# Patient Record
Sex: MALE | Race: WHITE | Employment: OTHER | URBAN - METROPOLITAN AREA
[De-identification: names, ages, dates, MRNs, and addresses within clinical notes are randomized per-mention and may not be internally consistent; named-entity substitution may affect disease eponyms.]

---

## 2019-01-30 ENCOUNTER — OFFICE VISIT (OUTPATIENT)
Dept: ORTHOPEDIC SURGERY | Age: 84
End: 2019-01-30
Payer: MEDICARE

## 2019-01-30 VITALS
BODY MASS INDEX: 27.28 KG/M2 | DIASTOLIC BLOOD PRESSURE: 60 MMHG | HEART RATE: 74 BPM | SYSTOLIC BLOOD PRESSURE: 110 MMHG | HEIGHT: 68 IN | WEIGHT: 180 LBS

## 2019-01-30 DIAGNOSIS — I73.9 INTERMITTENT CLAUDICATION (HCC): ICD-10-CM

## 2019-01-30 DIAGNOSIS — M25.571 ACUTE RIGHT ANKLE PAIN: Primary | ICD-10-CM

## 2019-01-30 PROCEDURE — G8419 CALC BMI OUT NRM PARAM NOF/U: HCPCS | Performed by: PODIATRIST

## 2019-01-30 PROCEDURE — 1036F TOBACCO NON-USER: CPT | Performed by: PODIATRIST

## 2019-01-30 PROCEDURE — 4040F PNEUMOC VAC/ADMIN/RCVD: CPT | Performed by: PODIATRIST

## 2019-01-30 PROCEDURE — 1123F ACP DISCUSS/DSCN MKR DOCD: CPT | Performed by: PODIATRIST

## 2019-01-30 PROCEDURE — G8427 DOCREV CUR MEDS BY ELIG CLIN: HCPCS | Performed by: PODIATRIST

## 2019-01-30 PROCEDURE — G8484 FLU IMMUNIZE NO ADMIN: HCPCS | Performed by: PODIATRIST

## 2019-01-30 PROCEDURE — 1101F PT FALLS ASSESS-DOCD LE1/YR: CPT | Performed by: PODIATRIST

## 2019-01-30 PROCEDURE — 99203 OFFICE O/P NEW LOW 30 MIN: CPT | Performed by: PODIATRIST

## 2019-02-20 ENCOUNTER — HOSPITAL ENCOUNTER (OUTPATIENT)
Dept: VASCULAR LAB | Age: 84
Discharge: HOME | End: 2019-02-20
Payer: MEDICARE

## 2019-02-20 PROCEDURE — 93923 UPR/LXTR ART STDY 3+ LVLS: CPT

## 2019-02-21 ENCOUNTER — HOSPITAL ENCOUNTER (OUTPATIENT)
Dept: ULTRASOUND IMAGING | Age: 84
Discharge: HOME | End: 2019-02-21
Payer: MEDICARE

## 2019-02-21 DIAGNOSIS — N18.30 CKD (CHRONIC KIDNEY DISEASE), STAGE III (HCC): ICD-10-CM

## 2019-02-21 PROCEDURE — 76770 US EXAM ABDO BACK WALL COMP: CPT

## 2019-03-06 ENCOUNTER — OFFICE VISIT (OUTPATIENT)
Dept: ORTHOPEDIC SURGERY | Age: 84
End: 2019-03-06
Payer: MEDICARE

## 2019-03-06 VITALS
DIASTOLIC BLOOD PRESSURE: 60 MMHG | BODY MASS INDEX: 27.26 KG/M2 | SYSTOLIC BLOOD PRESSURE: 115 MMHG | HEART RATE: 73 BPM | HEIGHT: 68 IN | WEIGHT: 179.9 LBS

## 2019-03-06 DIAGNOSIS — L97.311 ANKLE ULCER, RIGHT, LIMITED TO BREAKDOWN OF SKIN (HCC): ICD-10-CM

## 2019-03-06 DIAGNOSIS — I73.9 INTERMITTENT CLAUDICATION (HCC): Primary | ICD-10-CM

## 2019-03-06 DIAGNOSIS — I73.9 PERIPHERAL VASCULAR DISEASE (HCC): ICD-10-CM

## 2019-03-06 PROCEDURE — 1123F ACP DISCUSS/DSCN MKR DOCD: CPT | Performed by: PODIATRIST

## 2019-03-06 PROCEDURE — 99213 OFFICE O/P EST LOW 20 MIN: CPT | Performed by: PODIATRIST

## 2019-03-06 PROCEDURE — 1101F PT FALLS ASSESS-DOCD LE1/YR: CPT | Performed by: PODIATRIST

## 2019-03-06 PROCEDURE — 1036F TOBACCO NON-USER: CPT | Performed by: PODIATRIST

## 2019-03-06 PROCEDURE — 4040F PNEUMOC VAC/ADMIN/RCVD: CPT | Performed by: PODIATRIST

## 2019-03-06 PROCEDURE — G8427 DOCREV CUR MEDS BY ELIG CLIN: HCPCS | Performed by: PODIATRIST

## 2019-03-06 PROCEDURE — G8484 FLU IMMUNIZE NO ADMIN: HCPCS | Performed by: PODIATRIST

## 2019-03-06 PROCEDURE — G8419 CALC BMI OUT NRM PARAM NOF/U: HCPCS | Performed by: PODIATRIST

## 2019-03-06 RX ORDER — ASPIRIN 81 MG/1
81 TABLET, CHEWABLE ORAL DAILY
Status: ON HOLD | COMMUNITY
End: 2020-08-17 | Stop reason: HOSPADM

## 2019-03-06 RX ORDER — FUROSEMIDE 20 MG/1
TABLET ORAL
COMMUNITY
End: 2019-04-10

## 2019-03-06 RX ORDER — ATORVASTATIN CALCIUM 40 MG/1
TABLET, FILM COATED ORAL
Status: ON HOLD | COMMUNITY
End: 2020-08-17 | Stop reason: HOSPADM

## 2019-03-06 RX ORDER — GLIPIZIDE 10 MG/1
TABLET ORAL
COMMUNITY
End: 2019-08-19

## 2019-03-06 RX ORDER — KETOROLAC TROMETHAMINE 5 MG/ML
SOLUTION OPHTHALMIC
COMMUNITY
End: 2019-04-10

## 2019-03-06 RX ORDER — NITROGLYCERIN 0.4 MG/1
TABLET SUBLINGUAL
COMMUNITY
End: 2019-10-07

## 2019-03-06 RX ORDER — METOPROLOL SUCCINATE 25 MG/1
12.5 TABLET, EXTENDED RELEASE ORAL DAILY
Status: ON HOLD | COMMUNITY
End: 2020-08-17 | Stop reason: HOSPADM

## 2019-03-06 RX ORDER — CEPHALEXIN 500 MG/1
CAPSULE ORAL
COMMUNITY
End: 2019-04-10

## 2019-03-06 RX ORDER — FINASTERIDE 5 MG/1
5 TABLET, FILM COATED ORAL DAILY
Status: ON HOLD | COMMUNITY
End: 2020-08-17 | Stop reason: HOSPADM

## 2019-03-06 RX ORDER — OMEPRAZOLE 40 MG/1
40 CAPSULE, DELAYED RELEASE ORAL DAILY
Status: ON HOLD | COMMUNITY
End: 2020-08-17 | Stop reason: HOSPADM

## 2019-03-06 RX ORDER — ONDANSETRON 4 MG/1
TABLET, FILM COATED ORAL
COMMUNITY
End: 2019-08-19

## 2019-03-06 RX ORDER — CIPROFLOXACIN HYDROCHLORIDE 3.5 MG/ML
SOLUTION/ DROPS TOPICAL
COMMUNITY
End: 2019-04-10

## 2019-03-06 RX ORDER — PREDNISOLONE ACETATE 10 MG/ML
SUSPENSION/ DROPS OPHTHALMIC
COMMUNITY
End: 2019-04-10

## 2019-03-06 RX ORDER — ALBUTEROL SULFATE 2.5 MG/3ML
SOLUTION RESPIRATORY (INHALATION)
COMMUNITY
End: 2019-12-16 | Stop reason: ALTCHOICE

## 2019-04-10 ENCOUNTER — APPOINTMENT (OUTPATIENT)
Dept: GENERAL RADIOLOGY | Age: 84
DRG: 871 | End: 2019-04-10
Payer: MEDICARE

## 2019-04-10 ENCOUNTER — HOSPITAL ENCOUNTER (INPATIENT)
Age: 84
LOS: 6 days | Discharge: NURSING, REHAB OR LONG-TERM CARE | DRG: 871 | End: 2019-04-16
Attending: EMERGENCY MEDICINE | Admitting: HOSPITALIST
Payer: MEDICARE

## 2019-04-10 DIAGNOSIS — R77.8 ELEVATED TROPONIN: ICD-10-CM

## 2019-04-10 DIAGNOSIS — D72.829 LEUKOCYTOSIS, UNSPECIFIED TYPE: ICD-10-CM

## 2019-04-10 DIAGNOSIS — J18.9 PNEUMONIA DUE TO ORGANISM: Primary | ICD-10-CM

## 2019-04-10 DIAGNOSIS — R09.02 HYPOXIA: ICD-10-CM

## 2019-04-10 DIAGNOSIS — R06.03 RESPIRATORY DISTRESS: ICD-10-CM

## 2019-04-10 PROBLEM — N17.0 ACUTE RENAL FAILURE WITH TUBULAR NECROSIS (HCC): Status: ACTIVE | Noted: 2019-04-10

## 2019-04-10 PROBLEM — N17.9 AKI (ACUTE KIDNEY INJURY) (HCC): Status: ACTIVE | Noted: 2019-04-10

## 2019-04-10 PROBLEM — J96.01 ACUTE HYPOXEMIC RESPIRATORY FAILURE (HCC): Status: ACTIVE | Noted: 2019-04-10

## 2019-04-10 LAB
A/G RATIO: 0.8 (ref 1.1–2.2)
ALBUMIN SERPL-MCNC: 4 G/DL (ref 3.4–5)
ALP BLD-CCNC: 97 U/L (ref 40–129)
ALT SERPL-CCNC: 11 U/L (ref 10–40)
ANION GAP SERPL CALCULATED.3IONS-SCNC: 14 MMOL/L (ref 3–16)
AST SERPL-CCNC: 15 U/L (ref 15–37)
BASE EXCESS VENOUS: -1.4 MMOL/L (ref -3–3)
BASOPHILS ABSOLUTE: 0.1 K/UL (ref 0–0.2)
BASOPHILS RELATIVE PERCENT: 0.7 %
BILIRUB SERPL-MCNC: 0.7 MG/DL (ref 0–1)
BUN BLDV-MCNC: 39 MG/DL (ref 7–20)
CALCIUM SERPL-MCNC: 10 MG/DL (ref 8.3–10.6)
CARBOXYHEMOGLOBIN: 3.4 % (ref 0–1.5)
CHLORIDE BLD-SCNC: 97 MMOL/L (ref 99–110)
CO2: 27 MMOL/L (ref 21–32)
CREAT SERPL-MCNC: 2 MG/DL (ref 0.8–1.3)
EOSINOPHILS ABSOLUTE: 0.2 K/UL (ref 0–0.6)
EOSINOPHILS RELATIVE PERCENT: 1.2 %
GFR AFRICAN AMERICAN: 39
GFR NON-AFRICAN AMERICAN: 32
GLOBULIN: 4.9 G/DL
GLUCOSE BLD-MCNC: 309 MG/DL (ref 70–99)
GLUCOSE BLD-MCNC: 317 MG/DL (ref 70–99)
GLUCOSE BLD-MCNC: 326 MG/DL (ref 70–99)
GLUCOSE BLD-MCNC: 338 MG/DL (ref 70–99)
GLUCOSE BLD-MCNC: 374 MG/DL (ref 70–99)
HCO3 VENOUS: 27.3 MMOL/L (ref 23–29)
HCT VFR BLD CALC: 41.3 % (ref 40.5–52.5)
HEMOGLOBIN: 12.7 G/DL (ref 13.5–17.5)
LACTIC ACID: 2.4 MMOL/L (ref 0.4–2)
LYMPHOCYTES ABSOLUTE: 1.2 K/UL (ref 1–5.1)
LYMPHOCYTES RELATIVE PERCENT: 7.1 %
MCH RBC QN AUTO: 24.3 PG (ref 26–34)
MCHC RBC AUTO-ENTMCNC: 30.7 G/DL (ref 31–36)
MCV RBC AUTO: 79.1 FL (ref 80–100)
METHEMOGLOBIN VENOUS: 0.3 %
MONOCYTES ABSOLUTE: 0.6 K/UL (ref 0–1.3)
MONOCYTES RELATIVE PERCENT: 3.9 %
NEUTROPHILS ABSOLUTE: 14.6 K/UL (ref 1.7–7.7)
NEUTROPHILS RELATIVE PERCENT: 87.1 %
O2 CONTENT, VEN: 6 VOL %
O2 SAT, VEN: 33 %
O2 THERAPY: ABNORMAL
PCO2, VEN: 64.9 MMHG (ref 40–50)
PDW BLD-RTO: 18.2 % (ref 12.4–15.4)
PERFORMED ON: ABNORMAL
PH VENOUS: 7.24 (ref 7.35–7.45)
PLATELET # BLD: 454 K/UL (ref 135–450)
PMV BLD AUTO: 7 FL (ref 5–10.5)
PO2, VEN: 24 MMHG (ref 25–40)
POTASSIUM REFLEX MAGNESIUM: 4.4 MMOL/L (ref 3.5–5.1)
PRO-BNP: 1589 PG/ML (ref 0–449)
RAPID INFLUENZA  B AGN: NEGATIVE
RAPID INFLUENZA A AGN: NEGATIVE
RBC # BLD: 5.22 M/UL (ref 4.2–5.9)
SODIUM BLD-SCNC: 138 MMOL/L (ref 136–145)
TCO2 CALC VENOUS: 29 MMOL/L
TOTAL PROTEIN: 8.9 G/DL (ref 6.4–8.2)
TROPONIN: 0.1 NG/ML
WBC # BLD: 16.8 K/UL (ref 4–11)

## 2019-04-10 PROCEDURE — 96365 THER/PROPH/DIAG IV INF INIT: CPT

## 2019-04-10 PROCEDURE — 6360000002 HC RX W HCPCS: Performed by: HOSPITALIST

## 2019-04-10 PROCEDURE — 6370000000 HC RX 637 (ALT 250 FOR IP): Performed by: HOSPITALIST

## 2019-04-10 PROCEDURE — 6370000000 HC RX 637 (ALT 250 FOR IP): Performed by: INTERNAL MEDICINE

## 2019-04-10 PROCEDURE — 99285 EMERGENCY DEPT VISIT HI MDM: CPT

## 2019-04-10 PROCEDURE — 6360000002 HC RX W HCPCS: Performed by: EMERGENCY MEDICINE

## 2019-04-10 PROCEDURE — 2500000003 HC RX 250 WO HCPCS: Performed by: INTERNAL MEDICINE

## 2019-04-10 PROCEDURE — 87040 BLOOD CULTURE FOR BACTERIA: CPT

## 2019-04-10 PROCEDURE — 99291 CRITICAL CARE FIRST HOUR: CPT | Performed by: INTERNAL MEDICINE

## 2019-04-10 PROCEDURE — 94761 N-INVAS EAR/PLS OXIMETRY MLT: CPT

## 2019-04-10 PROCEDURE — 87804 INFLUENZA ASSAY W/OPTIC: CPT

## 2019-04-10 PROCEDURE — 80053 COMPREHEN METABOLIC PANEL: CPT

## 2019-04-10 PROCEDURE — 83605 ASSAY OF LACTIC ACID: CPT

## 2019-04-10 PROCEDURE — 51798 US URINE CAPACITY MEASURE: CPT

## 2019-04-10 PROCEDURE — 94660 CPAP INITIATION&MGMT: CPT

## 2019-04-10 PROCEDURE — 36415 COLL VENOUS BLD VENIPUNCTURE: CPT

## 2019-04-10 PROCEDURE — 71045 X-RAY EXAM CHEST 1 VIEW: CPT

## 2019-04-10 PROCEDURE — 2580000003 HC RX 258: Performed by: HOSPITALIST

## 2019-04-10 PROCEDURE — 82803 BLOOD GASES ANY COMBINATION: CPT

## 2019-04-10 PROCEDURE — 83880 ASSAY OF NATRIURETIC PEPTIDE: CPT

## 2019-04-10 PROCEDURE — 94640 AIRWAY INHALATION TREATMENT: CPT

## 2019-04-10 PROCEDURE — 84484 ASSAY OF TROPONIN QUANT: CPT

## 2019-04-10 PROCEDURE — 85025 COMPLETE CBC W/AUTO DIFF WBC: CPT

## 2019-04-10 PROCEDURE — 6360000002 HC RX W HCPCS: Performed by: INTERNAL MEDICINE

## 2019-04-10 PROCEDURE — 83036 HEMOGLOBIN GLYCOSYLATED A1C: CPT

## 2019-04-10 PROCEDURE — 2580000003 HC RX 258: Performed by: INTERNAL MEDICINE

## 2019-04-10 PROCEDURE — 6370000000 HC RX 637 (ALT 250 FOR IP): Performed by: EMERGENCY MEDICINE

## 2019-04-10 PROCEDURE — 93005 ELECTROCARDIOGRAM TRACING: CPT | Performed by: EMERGENCY MEDICINE

## 2019-04-10 PROCEDURE — 2000000000 HC ICU R&B

## 2019-04-10 RX ORDER — DULOXETIN HYDROCHLORIDE 60 MG/1
60 CAPSULE, DELAYED RELEASE ORAL DAILY
Status: ON HOLD | COMMUNITY
End: 2020-08-17 | Stop reason: HOSPADM

## 2019-04-10 RX ORDER — DEXTROSE MONOHYDRATE 25 G/50ML
12.5 INJECTION, SOLUTION INTRAVENOUS PRN
Status: DISCONTINUED | OUTPATIENT
Start: 2019-04-10 | End: 2019-04-16 | Stop reason: HOSPADM

## 2019-04-10 RX ORDER — GUAIFENESIN 600 MG/1
600 TABLET, EXTENDED RELEASE ORAL 2 TIMES DAILY
Status: ON HOLD | COMMUNITY
End: 2019-04-16 | Stop reason: HOSPADM

## 2019-04-10 RX ORDER — GLIPIZIDE 5 MG/1
5 TABLET ORAL
Status: ON HOLD | COMMUNITY
End: 2019-08-22

## 2019-04-10 RX ORDER — GUAIFENESIN 600 MG/1
600 TABLET, EXTENDED RELEASE ORAL DAILY
Status: ON HOLD | COMMUNITY
End: 2019-04-10

## 2019-04-10 RX ORDER — SODIUM CHLORIDE 0.9 % (FLUSH) 0.9 %
10 SYRINGE (ML) INJECTION PRN
Status: DISCONTINUED | OUTPATIENT
Start: 2019-04-10 | End: 2019-04-16 | Stop reason: HOSPADM

## 2019-04-10 RX ORDER — LEVOFLOXACIN 25 MG/ML
500 INJECTION INTRAVENOUS ONCE
Status: DISCONTINUED | OUTPATIENT
Start: 2019-04-10 | End: 2019-04-10

## 2019-04-10 RX ORDER — INSULIN GLARGINE 100 [IU]/ML
12 INJECTION, SOLUTION SUBCUTANEOUS NIGHTLY
COMMUNITY
End: 2019-10-07

## 2019-04-10 RX ORDER — FERROUS SULFATE 325(65) MG
325 TABLET ORAL
COMMUNITY
End: 2019-11-04

## 2019-04-10 RX ORDER — NITROGLYCERIN 0.4 MG/1
0.4 TABLET SUBLINGUAL EVERY 5 MIN PRN
Status: DISCONTINUED | OUTPATIENT
Start: 2019-04-10 | End: 2019-04-16 | Stop reason: HOSPADM

## 2019-04-10 RX ORDER — METHYLPREDNISOLONE SODIUM SUCCINATE 40 MG/ML
40 INJECTION, POWDER, LYOPHILIZED, FOR SOLUTION INTRAMUSCULAR; INTRAVENOUS EVERY 12 HOURS
Status: DISCONTINUED | OUTPATIENT
Start: 2019-04-10 | End: 2019-04-11

## 2019-04-10 RX ORDER — SODIUM CHLORIDE 0.9 % (FLUSH) 0.9 %
10 SYRINGE (ML) INJECTION EVERY 12 HOURS SCHEDULED
Status: DISCONTINUED | OUTPATIENT
Start: 2019-04-10 | End: 2019-04-16 | Stop reason: HOSPADM

## 2019-04-10 RX ORDER — ONDANSETRON 4 MG/1
4 TABLET, ORALLY DISINTEGRATING ORAL EVERY 8 HOURS PRN
Status: DISCONTINUED | OUTPATIENT
Start: 2019-04-10 | End: 2019-04-16 | Stop reason: HOSPADM

## 2019-04-10 RX ORDER — IPRATROPIUM BROMIDE AND ALBUTEROL SULFATE 2.5; .5 MG/3ML; MG/3ML
1 SOLUTION RESPIRATORY (INHALATION) EVERY 4 HOURS
Status: DISCONTINUED | OUTPATIENT
Start: 2019-04-10 | End: 2019-04-11

## 2019-04-10 RX ORDER — PANTOPRAZOLE SODIUM 40 MG/1
40 TABLET, DELAYED RELEASE ORAL
Status: DISCONTINUED | OUTPATIENT
Start: 2019-04-10 | End: 2019-04-16 | Stop reason: HOSPADM

## 2019-04-10 RX ORDER — CHLORTHALIDONE 25 MG/1
25 TABLET ORAL DAILY
Status: ON HOLD | COMMUNITY
End: 2019-04-16 | Stop reason: HOSPADM

## 2019-04-10 RX ORDER — IPRATROPIUM BROMIDE AND ALBUTEROL SULFATE 2.5; .5 MG/3ML; MG/3ML
1 SOLUTION RESPIRATORY (INHALATION) ONCE
Status: COMPLETED | OUTPATIENT
Start: 2019-04-10 | End: 2019-04-10

## 2019-04-10 RX ORDER — CILOSTAZOL 100 MG/1
100 TABLET ORAL 2 TIMES DAILY
Status: DISCONTINUED | OUTPATIENT
Start: 2019-04-10 | End: 2019-04-16 | Stop reason: HOSPADM

## 2019-04-10 RX ORDER — GABAPENTIN 300 MG/1
300 CAPSULE ORAL NIGHTLY
COMMUNITY
End: 2019-11-04 | Stop reason: ALTCHOICE

## 2019-04-10 RX ORDER — ASPIRIN 81 MG/1
81 TABLET, CHEWABLE ORAL DAILY
Status: DISCONTINUED | OUTPATIENT
Start: 2019-04-10 | End: 2019-04-16 | Stop reason: HOSPADM

## 2019-04-10 RX ORDER — METOPROLOL SUCCINATE 25 MG/1
25 TABLET, EXTENDED RELEASE ORAL DAILY
Status: DISCONTINUED | OUTPATIENT
Start: 2019-04-10 | End: 2019-04-10

## 2019-04-10 RX ORDER — IPRATROPIUM BROMIDE AND ALBUTEROL SULFATE 2.5; .5 MG/3ML; MG/3ML
1 SOLUTION RESPIRATORY (INHALATION)
Status: DISCONTINUED | OUTPATIENT
Start: 2019-04-10 | End: 2019-04-10

## 2019-04-10 RX ORDER — METHYLPREDNISOLONE SODIUM SUCCINATE 40 MG/ML
40 INJECTION, POWDER, LYOPHILIZED, FOR SOLUTION INTRAMUSCULAR; INTRAVENOUS EVERY 8 HOURS
Status: DISCONTINUED | OUTPATIENT
Start: 2019-04-10 | End: 2019-04-10

## 2019-04-10 RX ORDER — SODIUM CHLORIDE 9 MG/ML
INJECTION, SOLUTION INTRAVENOUS CONTINUOUS
Status: DISCONTINUED | OUTPATIENT
Start: 2019-04-10 | End: 2019-04-14

## 2019-04-10 RX ORDER — POLYETHYLENE GLYCOL 3350 17 G/17G
17 POWDER, FOR SOLUTION ORAL SEE ADMIN INSTRUCTIONS
Status: ON HOLD | COMMUNITY
End: 2020-08-17 | Stop reason: HOSPADM

## 2019-04-10 RX ORDER — DEXTROSE MONOHYDRATE 50 MG/ML
100 INJECTION, SOLUTION INTRAVENOUS PRN
Status: DISCONTINUED | OUTPATIENT
Start: 2019-04-10 | End: 2019-04-16 | Stop reason: HOSPADM

## 2019-04-10 RX ORDER — LEVOFLOXACIN 5 MG/ML
750 INJECTION, SOLUTION INTRAVENOUS ONCE
Status: COMPLETED | OUTPATIENT
Start: 2019-04-10 | End: 2019-04-10

## 2019-04-10 RX ORDER — OXYCODONE HYDROCHLORIDE 5 MG/1
5 CAPSULE ORAL 2 TIMES DAILY
Status: ON HOLD | COMMUNITY
End: 2019-04-16 | Stop reason: HOSPADM

## 2019-04-10 RX ORDER — ATORVASTATIN CALCIUM 40 MG/1
40 TABLET, FILM COATED ORAL DAILY
Status: DISCONTINUED | OUTPATIENT
Start: 2019-04-10 | End: 2019-04-16 | Stop reason: HOSPADM

## 2019-04-10 RX ORDER — INSULIN GLARGINE 100 [IU]/ML
3 INJECTION, SOLUTION SUBCUTANEOUS NIGHTLY
Status: DISCONTINUED | OUTPATIENT
Start: 2019-04-10 | End: 2019-04-11

## 2019-04-10 RX ORDER — GUAIFENESIN 600 MG/1
600 TABLET, EXTENDED RELEASE ORAL DAILY
Status: DISCONTINUED | OUTPATIENT
Start: 2019-04-10 | End: 2019-04-16 | Stop reason: HOSPADM

## 2019-04-10 RX ORDER — GABAPENTIN 100 MG/1
100 CAPSULE ORAL DAILY
COMMUNITY
End: 2019-08-19

## 2019-04-10 RX ORDER — NICOTINE POLACRILEX 4 MG
15 LOZENGE BUCCAL PRN
Status: DISCONTINUED | OUTPATIENT
Start: 2019-04-10 | End: 2019-04-16 | Stop reason: HOSPADM

## 2019-04-10 RX ORDER — ONDANSETRON 4 MG/1
4 TABLET, FILM COATED ORAL EVERY 8 HOURS PRN
Status: DISCONTINUED | OUTPATIENT
Start: 2019-04-10 | End: 2019-04-10

## 2019-04-10 RX ORDER — ERGOCALCIFEROL 1.25 MG/1
10000 CAPSULE ORAL WEEKLY
Status: DISCONTINUED | OUTPATIENT
Start: 2019-04-10 | End: 2019-04-15

## 2019-04-10 RX ORDER — CHLORTHALIDONE 25 MG/1
25 TABLET ORAL DAILY
Status: DISCONTINUED | OUTPATIENT
Start: 2019-04-10 | End: 2019-04-10

## 2019-04-10 RX ORDER — OXYCODONE HYDROCHLORIDE 5 MG/1
5 TABLET ORAL 2 TIMES DAILY
Status: DISCONTINUED | OUTPATIENT
Start: 2019-04-10 | End: 2019-04-11

## 2019-04-10 RX ORDER — ERGOCALCIFEROL 1.25 MG/1
10000 CAPSULE ORAL WEEKLY
Status: ON HOLD | COMMUNITY
End: 2019-04-15

## 2019-04-10 RX ORDER — GABAPENTIN 100 MG/1
100 CAPSULE ORAL DAILY
Status: DISCONTINUED | OUTPATIENT
Start: 2019-04-10 | End: 2019-04-16 | Stop reason: HOSPADM

## 2019-04-10 RX ORDER — FINASTERIDE 5 MG/1
5 TABLET, FILM COATED ORAL DAILY
Status: DISCONTINUED | OUTPATIENT
Start: 2019-04-10 | End: 2019-04-10

## 2019-04-10 RX ORDER — CILOSTAZOL 100 MG/1
100 TABLET ORAL 2 TIMES DAILY
COMMUNITY
End: 2019-12-16 | Stop reason: ALTCHOICE

## 2019-04-10 RX ORDER — ONDANSETRON 2 MG/ML
4 INJECTION INTRAMUSCULAR; INTRAVENOUS EVERY 6 HOURS PRN
Status: DISCONTINUED | OUTPATIENT
Start: 2019-04-10 | End: 2019-04-16 | Stop reason: HOSPADM

## 2019-04-10 RX ORDER — BUDESONIDE AND FORMOTEROL FUMARATE DIHYDRATE 80; 4.5 UG/1; UG/1
2 AEROSOL RESPIRATORY (INHALATION) 2 TIMES DAILY
COMMUNITY
End: 2019-08-19

## 2019-04-10 RX ADMIN — INSULIN LISPRO 6 UNITS: 100 INJECTION, SOLUTION INTRAVENOUS; SUBCUTANEOUS at 20:40

## 2019-04-10 RX ADMIN — OXYCODONE HYDROCHLORIDE 5 MG: 5 TABLET ORAL at 20:58

## 2019-04-10 RX ADMIN — VANCOMYCIN HYDROCHLORIDE 1250 MG: 1 INJECTION, POWDER, LYOPHILIZED, FOR SOLUTION INTRAVENOUS at 11:45

## 2019-04-10 RX ADMIN — METHYLPREDNISOLONE SODIUM SUCCINATE 40 MG: 40 INJECTION, POWDER, FOR SOLUTION INTRAMUSCULAR; INTRAVENOUS at 20:39

## 2019-04-10 RX ADMIN — IPRATROPIUM BROMIDE AND ALBUTEROL SULFATE 1 AMPULE: .5; 3 SOLUTION RESPIRATORY (INHALATION) at 04:55

## 2019-04-10 RX ADMIN — DEXMEDETOMIDINE HYDROCHLORIDE 0.2 MCG/KG/HR: 100 INJECTION, SOLUTION INTRAVENOUS at 11:44

## 2019-04-10 RX ADMIN — INSULIN LISPRO 15 UNITS: 100 INJECTION, SOLUTION INTRAVENOUS; SUBCUTANEOUS at 16:44

## 2019-04-10 RX ADMIN — CILOSTAZOL 100 MG: 100 TABLET ORAL at 20:57

## 2019-04-10 RX ADMIN — DEXMEDETOMIDINE HYDROCHLORIDE 0.1 MCG/KG/HR: 100 INJECTION, SOLUTION INTRAVENOUS at 13:11

## 2019-04-10 RX ADMIN — INSULIN LISPRO 12 UNITS: 100 INJECTION, SOLUTION INTRAVENOUS; SUBCUTANEOUS at 12:24

## 2019-04-10 RX ADMIN — INSULIN GLARGINE 3 UNITS: 100 INJECTION, SOLUTION SUBCUTANEOUS at 20:40

## 2019-04-10 RX ADMIN — IPRATROPIUM BROMIDE AND ALBUTEROL SULFATE 1 AMPULE: .5; 3 SOLUTION RESPIRATORY (INHALATION) at 08:20

## 2019-04-10 RX ADMIN — LEVOFLOXACIN 750 MG: 5 INJECTION, SOLUTION INTRAVENOUS at 05:39

## 2019-04-10 RX ADMIN — INSULIN LISPRO 4 UNITS: 100 INJECTION, SOLUTION INTRAVENOUS; SUBCUTANEOUS at 08:36

## 2019-04-10 RX ADMIN — Medication 10 ML: at 20:39

## 2019-04-10 RX ADMIN — IPRATROPIUM BROMIDE AND ALBUTEROL SULFATE 1 AMPULE: .5; 3 SOLUTION RESPIRATORY (INHALATION) at 19:46

## 2019-04-10 RX ADMIN — METHYLPREDNISOLONE SODIUM SUCCINATE 40 MG: 40 INJECTION, POWDER, FOR SOLUTION INTRAMUSCULAR; INTRAVENOUS at 08:29

## 2019-04-10 RX ADMIN — ENOXAPARIN SODIUM 30 MG: 100 INJECTION SUBCUTANEOUS at 12:02

## 2019-04-10 RX ADMIN — CEFEPIME 2 G: 2 INJECTION, POWDER, FOR SOLUTION INTRAVENOUS at 13:15

## 2019-04-10 RX ADMIN — IPRATROPIUM BROMIDE AND ALBUTEROL SULFATE 1 AMPULE: .5; 3 SOLUTION RESPIRATORY (INHALATION) at 15:33

## 2019-04-10 RX ADMIN — IPRATROPIUM BROMIDE AND ALBUTEROL SULFATE 1 AMPULE: .5; 3 SOLUTION RESPIRATORY (INHALATION) at 11:00

## 2019-04-10 RX ADMIN — SODIUM CHLORIDE: 9 INJECTION, SOLUTION INTRAVENOUS at 23:46

## 2019-04-10 RX ADMIN — SODIUM CHLORIDE: 9 INJECTION, SOLUTION INTRAVENOUS at 08:24

## 2019-04-10 ASSESSMENT — PAIN SCALES - GENERAL: PAINLEVEL_OUTOF10: 0

## 2019-04-10 NOTE — H&P
History and Physical      Chief Complaint   Patient presents with    Respiratory Distress        HISTORY OF PRESENT ILLNESS:     Patient is a 80-year-old white male with a history of COPD, coronary artery disease who was brought to emergency room because of acute onset of shortness of breath. Patient was found to be in acute hypoxemic respiratory failure. Workup also showed bilateral pneumonia. He is unable to give me much of a history of present BiPAP. He's felt poorly for about a month. He's had a progressive cough and shortness of breath at rest. At home he is not on oxygen. He is a former smoker. He has sore on right lateral ankle and left big toe. He has not made any urine. He is a past medical history of coronary artery disease, COPD, diabetes, AAA, depression, hypertension, hyperlipidemia and he is status post pacemaker. Patient is allergic to no known allergies.     Past Medical History:   Diagnosis Date    AAA (abdominal aortic aneurysm) (HCC)     Acute constipation     Acute gastrointestinal hemorrhage     Bladder CA in situ     BPH (benign prostatic hyperplasia)     Constipation     COPD (chronic obstructive pulmonary disease) (HCC)     Coronary arteriosclerosis     Current mild episode of major depressive disorder (HCC)     Depression     Diabetes mellitus (HCC)     Diabetic neuropathy (HCC)     Diverticulitis of intestine w/o perforation or abscess with bleeding     GERD (gastroesophageal reflux disease)     GI hemorrhage     Hypercholesteremia     Hypertension     Hypoaldosteronism ([address]     Pacemaker     Postsurgical aortocoronary bypass status     Pure hypercholesterolemia     S/P cataract surgery     Ulcer of left foot ([address]     Vitamin D deficiency        Past Surgical History:   Procedure Laterality Date    EYE SURGERY      PACEMAKER INSERTION         Scheduled Meds:   aspirin  81 mg Oral Daily    atorvastatin  40 mg Oral Daily    cilostazol  100 mg Oral Negative   Rapid Influenza B Ag Latest Ref Range: Negative  Negative     XR CHEST PORTABLE   Final Result   Multifocal bilateral pneumonia with bilateral pleural effusions. ECG: demand pacemaker    ASSESSMENT:    Principal Problem:    Acute hypoxemic respiratory failure (HCC)  Active Problems:    HCAP (healthcare-associated pneumonia)    MARCOS (acute kidney injury) ([facility] [address]    Elevated troponin    COPD (chronic obstructive pulmonary disease) (HCC)    Hypertension    Hypercholesteremia    GERD (gastroesophageal reflux disease)    Diabetes mellitus ([facility] [address]  Resolved Problems:    * No resolved hospital problems. *      PLAN:    #  Acute respiratory failure: Work up was consistent with acute respiratory failure. Patient was placed on BiPAP. Will continue use BiPAP for now. He is somewhat lethargic. Await clinical improvement in respiratory status. #  Healthcare associated pneumonia due to gram-negative organism or MRSA. Patient is a nursing home resident. The patient started on vancomycin and cefepime. Await culture results. #  Acute kidney injury possibly from prerenal azotemia. On IV fluids. Repeat labs in a.m. #  Nonspecific elevation of troponin. We'll trend troponins. No ACS. #  COPD exacerbation. Use steroids and handheld nebulizer treatments. #  Hypertension blood pressure stable. #  Use Lovenox for DVT prophylaxis. #  Diabetes mellitus type 2. Use of insulin. 33 minutes of CCT spent in evaluation, documentation, reviewing of data and discussing with consultants. All questions and concerns were addressed to the patient/family. Alternatives to my treatment were discussed. The note was completed using EMR. Every effort was made to ensure accuracy; however, inadvertent computerized transcription errors may be present.                   Capri Patel 4/10/2019 11:51 AM

## 2019-04-10 NOTE — PROGRESS NOTES
Pharmacy Note  Vancomycin Consult    Jayden Jarvis is a 80 y.o. male started on Vancomycin for PNA consult received from Dr. Alexandr Escobar to manage therapy. Also receiving the following antibiotics: Cefepime. Patient Active Problem List   Diagnosis    HCAP (healthcare-associated pneumonia)    Acute hypoxemic respiratory failure (HCC)       Allergies:  No known allergies     Temp max: 98.7    Recent Labs     04/10/19  0449   BUN 39*       Recent Labs     04/10/19  0449   CREATININE 2.0*       Recent Labs     04/10/19  0449   WBC 16.8*       No intake or output data in the 24 hours ending 04/10/19 1101    Culture Date      Source                       Results  NGTD    Ht Readings from Last 1 Encounters:   04/10/19 5' 7\" (1.702 m)        Wt Readings from Last 1 Encounters:   04/10/19 180 lb (81.6 kg)         Body mass index is 28.19 kg/m². Estimated Creatinine Clearance: 28 mL/min (A) (based on SCr of 2 mg/dL (H)). Goal Trough Level: 15-18mcg/mL    Assessment/Plan:  Will initiate vancomycin 1250mg IV times one. Will pulse dose, random in am, cefepime 2gm q24h placed    Thank you for the consult. Will continue to follow.   Abbey Gonzalez Pharm D 5/87/252838:39 AM  .

## 2019-04-10 NOTE — LETTER
including skilled nursing facilities, home health agencies, inpatient rehabilitation facilities, and long term care hospitals. [facility] is working closely with the doctors and other health care providers that care for you during and following your hospital stay and for a period of time after you leave the hospital. By working together, the health care providers are trying to more efficiently provide well-managed, high quality, patient-centered care as you undergo treatment. Hospitals, doctors, and other health care providers that care for you following a hospital stay may receive an additional payment for providing better, more coordinated health care. Medicare will monitor your care to make sure you and others get high quality care. Your feedback is important     Medicare may also ask you to answer a survey about the services and care you received from [address] will be mailed to you. Your feedback will improve care for all people with Medicare who receive care from [facility]. Completion of this survey is optional.     Get more information     For more information about the Bundled Payments for [address] [place] [address], you can:    · Visit the CMS BPCI Advanced Website at http://mcdonald-olivia.net/ initiatives/bpci-advanced   · Call the [facility] team at (543) 459-9224. · Call 1-800-MEDICARE (1-337.308.3375). TTY users can call 5-604.452.9492     If you have concerns or complaints about your care, talk to your health care provider, or contact your Beneficiary and Family Centered Quality Improvement Organization [facility]). To get your [facility]'s phone number, visit Medicare.gov/contacts or call 1-800-MEDICARE. · To find a different hospital, visit www. hospitalcompare.[facility].gov or call 1-800[employer] MEDICARE (1-346.770.4416). TTY users should call 6-624.472.5813.

## 2019-04-10 NOTE — PROGRESS NOTES
Patient admitted to the ICU from [place], arrived by squad with bipap on. He is alert and oriented, VSS, BP on the low side 96/53. He denies any dizzines, pain or problems with breathing on the bipap. Upon admission he was assessed, given chlorhexidine bath. He was oriented to the ICU room call light, bed operation, and updated on the plan of care. He denied any questions at this time. He rests in bed with his eyes closed.

## 2019-04-10 NOTE — PROGRESS NOTES
Report received from Kia Bishop, [facility]. Pt quiet in bed with eyes closed. No obvious distress. BiPAP in use. NS @ 75 infusing. Care transferred.

## 2019-04-10 NOTE — ED TRIAGE NOTES
Pt arrived via EMS c/o respiratory distress. Pt from [facility], report from nurse stated pt had been struggling all night, O2 got to 62% on RA. Pt also c/o nausea and a headache. Pt was placed on 15L NRB and given duoneb treatment en route, 89% upon arrival. Pt roomed in 2, placed on bipap. Oxygen improved to % at this time.

## 2019-04-10 NOTE — PROGRESS NOTES
Patient becoming hypotensive. Patient arouses to voice and once stimulated, blood pressure rises. Precedex stopped at this time.

## 2019-04-10 NOTE — PROGRESS NOTES
4 Eyes Skin Assessment     The patient is being assess for   Admission    I agree that 2 RN's have performed a thorough Head to Toe Skin Assessment on the patient. ALL assessment sites listed below have been assessed. Areas assessed by both nurses:   [x]   Head, Face, and Ears   [x]   Shoulders, Back, and Chest, Abdomen  []   Arms, Elbows, and Hands   [x]   Coccyx, Sacrum, and Ischium  [x]   Legs, Feet, and Heels        -skin tear L elbow per NH documentation, appears to be an abrasion  -L great toe with abrasion  -R outer ankle, 2 areas (one possible pressure)       **SHARE this note so that the co-signing nurse is able to place an eSignature**    Co-signer eSignature: Electronically signed by Tram Wilson RN on 4/10/19 at 2:24 PM    Does the Patient have Skin Breakdown?   Yes LDA WOUND CARE was Initiated documentation include the Josee-wound, Wound Assessment, Measurements, Dressing Treatment, Drainage, and Color\",          Lucien Prevention initiated:  Yes   Wound Care Orders initiated:  Yes      [id number]  nurse consulted for Pressure Injury (Stage 3,4, Unstageable, DTI, NWPT, Complex wounds)and New or Established Ostomies:  Yes      Primary Nurse eSignature: Electronically signed by Meme Gorman RN on 4/10/19 at 1:16 PM

## 2019-04-10 NOTE — PROGRESS NOTES
delivered and/or substituted as outlined below. Aerosolized Medications Ordering and Administration Guidelines:    1. All Medications will be ordered by a physician, and their frequency and/or modality will be adjusted as defined by the patients Respiratory Severity Index (RSI) score. 2. If the patient does not have documented COPD, consider discontinuing anticholinergics when RSI is less than 9.  3. If the bronchospasm worsens (increased RSI), then the bronchodilator frequency can be increased to a maximum of every 4 hours. If greater than every 4 hours is required, the physician will be contacted. 4. If the bronchospasm improves, the frequency of the bronchodilator can be decreased, based on the patient's RSI, but not less than home treatment regimen frequency. 5. Bronchodilator(s) will be discontinued if patient has a RSI less than 9 and has received no scheduled or as needed treatment for 72  Hrs. Patients Ordered on a Mucolytic Agent:    1. Must always be administered with a bronchodilator. 2. Discontinue if patient experiences worsened bronchospasm, or secretions have lessened to the point that the patient is able to clear them with a cough. Anti-inflammatory and Combination Medications:    1. If the patient lacks prior history of lung disease, is not using inhaled anti-inflammatory medication at home, and lacks wheezing by examination or by history for at least 24 hours, contact physician for possible discontinuation.

## 2019-04-10 NOTE — CONSULTS
Patient is being seen at the request of Dr. Sanam Denis for a consultation for Acute respiratory failure with hypoxemia    HISTORY OF PRESENT ILLNESS: The patient is an 79 yo man with hx of COPD, [facility] resident, CAD who was BIBA after becoming acutely more short of breath and hypoxemic overnight. He states he has felt poorly for last month. He has been using Inhaled bronchodilators with some relief. He has a productive cough and progressive dyspnea at rest.  Upon arrival to ER he was noted to be in some respiratory distress and hypoxemic. He was placed upon NIPPV and admitted to the ICU. He is a former smoker. PAST MEDICAL HISTORY:  Past Medical History:   Diagnosis Date    [facility]     Acute constipation     Acute gastrointestinal hemorrhage     Bladder CA in situ     BPH (benign prostatic hyperplasia)     Constipation     COPD (chronic obstructive pulmonary disease) ([facility]     Coronary arteriosclerosis     Current mild episode of major depressive disorder (HCC)     Depression     Diabetes mellitus (HCC)     Diabetic neuropathy ([facility])     Diverticulitis of intestine w/o perforation or abscess with bleeding     GERD (gastroesophageal reflux disease)     GI hemorrhage     Hypercholesteremia     Hypertension     Hypoaldosteronism ([address]     Pacemaker     Postsurgical aortocoronary bypass status     Pure hypercholesterolemia     S/P cataract surgery     Ulcer of left foot ([address]     Vitamin D deficiency      PAST SURGICAL HISTORY:  Past Surgical History:   Procedure Laterality Date    EYE SURGERY      PACEMAKER INSERTION         FAMILY HISTORY:  family history is not on file. SOCIAL HISTORY:   reports that he has never smoked.  He has never used smokeless tobacco.    Scheduled Meds:   methylPREDNISolone sodium  40 mg Intravenous Q8H    aspirin  81 mg Oral Daily    atorvastatin  40 mg Oral Daily    mometasone-formoterol  2 puff Inhalation BID    chlorthalidone 25 mg Oral Daily    cilostazol  100 mg Oral BID    finasteride  5 mg Oral Daily    gabapentin  100 mg Oral Daily    guaiFENesin  600 mg Oral Daily    insulin glargine  3 Units Subcutaneous Nightly    metoprolol succinate  25 mg Oral Daily    pantoprazole  40 mg Oral QAM AC    oxyCODONE  5 mg Oral BID    vitamin D  50,000 Units Oral Weekly    sodium chloride flush  10 mL Intravenous 2 times per day    enoxaparin  30 mg Subcutaneous Daily    insulin lispro  0-6 Units Subcutaneous TID WC    insulin lispro  0-3 Units Subcutaneous Nightly     Continuous Infusions:   sodium chloride 75 mL/hr at 04/10/19 0824    dextrose       PRN Meds:  nitroGLYCERIN, sodium chloride flush, magnesium hydroxide, ondansetron, glucose, dextrose, glucagon (rDNA), dextrose, ondansetron    ALLERGIES:  Patient is allergic to no known allergies. REVIEW OF SYSTEMS:  Constitutional: Negative for fever  HENT: Negative for sore throat  Eyes: Negative for redness   Respiratory: +  for dyspnea, + cough  Cardiovascular: Negative for chest pain  Gastrointestinal: Negative for vomiting, diarrhea   Genitourinary: Negative for hematuria   Musculoskeletal: Negative for arthralgias   Skin: Negative for rash  Neurological: Negative for syncope  Hematological: Negative for adenopathy  Psychiatric/Behavorial: Negative for anxiety    PHYSICAL EXAM:  Blood pressure (!) 91/54, pulse 70, temperature 98.7 °F (37.1 °C), temperature source Temporal, resp. rate 16, height 5' 7\" (1.702 m), weight 180 lb (81.6 kg), SpO2 98 %.' on bipap 30%  Gen: No distress. Normocephalic, atraumatic. Eyes: PERRL. No sclera icterus. No conjunctival injection. ENT: No discharge. Pharynx clear. Neck: Trachea midline. No obvious mass. Resp: No accessory muscle use. No crackles. Scattered bilateral wheezes. No rhonchi. No dullness on percussion. CV: Regular rate. Regular rhythm. No murmur or rub. No edema. GI: Non-tender. Non-distended. No hernia.    Skin: Warm and dry. No nodule on exposed extremities. Lymph: No cervical LAD. No supraclavicular LAD. M/S: No cyanosis. No joint deformity. No clubbing. Neuro: Awake. Alert. Moves all four extremities. Psych: Oriented x 3. No anxiety. LABS:  CBC:   Recent Labs     04/10/19  0449   WBC 16.8*   HGB 12.7*   HCT 41.3   MCV 79.1*   *     BMP:   Recent Labs     04/10/19  0449      K 4.4   CL 97*   CO2 27   BUN 39*   CREATININE 2.0*     LIVER PROFILE:   Recent Labs     04/10/19  0449   AST 15   ALT 11   BILITOT 0.7   ALKPHOS 97     PT/INR: No results for input(s): PROTIME, INR in the last 72 hours. APTT: No results for input(s): APTT in the last 72 hours. UA:No results for input(s): NITRITE, COLORU, PHUR, LABCAST, WBCUA, RBCUA, MUCUS, TRICHOMONAS, YEAST, BACTERIA, CLARITYU, SPECGRAV, LEUKOCYTESUR, UROBILINOGEN, BILIRUBINUR, BLOODU, GLUCOSEU, AMORPHOUS in the last 72 hours. Invalid input(s): KETONESU  No results for input(s): PHART, VWH3RTU, PO2ART in the last 72 hours. Chest imaging was reviewed by me and showed:  CXR: severe multifocal airspace consolidation R>L    ASSESSMENT:  ·  Acute respiratory failure with hypoxemia  · Healthcare associated pneumonia - probable gram negative, risk for methicillin resistant Staph aureus as well   · Elevated troponin  · CAD  · Pacemaker  · DM    PLAN:  · Non-invasive positive pressure ventilation per my orders. The ventilator was adjusted by me at the bedside for unstable, life threatening respiratory failure. Wean oxygen as tolerated. Supplemental oxygen to maintain SaO2 >92%; wean as tolerated  Antibiotics to cover HCAP (vanc and cefepime)  Iv steroids  Inhaled bronchodilators   Follow sputum and blood cultures  · Rapid flu test  · Lantus 3 U daily, HSSI  · ICU care  · D/w POA- daughter in law and wife with patient. He wishes to be full code for now but would not want prolonged ventilation (weeks)    . Patient is critically ill with acute respiratory failure. We will adjust NIPPV as above. Critical care time spent reviewing labs/films, examining patient, collaborating with other physicians but excluding procedures for life threatening organ failure is 32 minutes.

## 2019-04-10 NOTE — ED PROVIDER NOTES
tablet,extended release   Take 1 tablet every day by oral route. NITROGLYCERIN (NITROSTAT) 0.4 MG SL TABLET    nitroglycerin 0.4 mg sublingual tablet    OMEPRAZOLE (PRILOSEC) 40 MG DELAYED RELEASE CAPSULE    omeprazole 40 mg capsule,delayed release    ONDANSETRON (ZOFRAN) 4 MG TABLET    ondansetron HCl 4 mg tablet     Allergies   Allergen Reactions    No Known Allergies         Physical Exam       ED Triage Vitals   BP Temp Temp Source Pulse Resp SpO2 Height Weight   04/10/19 0503 04/10/19 0503 04/10/19 0503 04/10/19 0503 04/10/19 0503 04/10/19 0503 04/10/19 0501 04/10/19 0501   (!) 169/69 98.7 °F (37.1 °C) Temporal 77 24 (!) 80 % 5' 7\" (1.702 m) 180 lb (81.6 kg)     Physical Exam   Constitutional: He is oriented to person, place, and time. He appears well-developed and well-nourished. He appears distressed. Patient is sitting up in bed, appears tired and fatigued. He does appear to be in some moderate respiratory distress and is having difficulties breathing. He is able to follow commands and answer basic questions but does have severe conversational dyspnea. HENT:   Head: Normocephalic and atraumatic. Mouth/Throat: Oropharynx is clear and moist.   Eyes: Pupils are equal, round, and reactive to light. EOM are normal. Right eye exhibits no discharge. Left eye exhibits no discharge. Neck: Normal range of motion. Neck supple. No tracheal deviation present. Cardiovascular: Normal rate, regular rhythm, normal heart sounds and intact distal pulses. Exam reveals no gallop and no friction rub. No murmur heard. Pulmonary/Chest: No stridor. He is in respiratory distress. He has wheezes. He has rales. He exhibits no tenderness. Abdominal: Soft. He exhibits no distension. There is no tenderness. There is no rebound and no guarding. Musculoskeletal: Normal range of motion. He exhibits no edema, tenderness or deformity. Neurological: He is alert and oriented to person, place, and time.  No cranial nerve deficit. Coordination normal.   Skin: Skin is warm and dry. No rash noted. He is not diaphoretic. No erythema. No pallor. Psychiatric: He has a normal mood and affect. Nursing note and vitals reviewed. Diagnostics   Labs:  Results for orders placed or performed during the hospital encounter of 04/10/19   CBC Auto Differential   Result Value Ref Range    WBC 16.8 (H) 4.0 - 11.0 K/uL    RBC 5.22 4.20 - 5.90 M/uL    Hemoglobin 12.7 (L) 13.5 - 17.5 g/dL    Hematocrit 41.3 40.5 - 52.5 %    MCV 79.1 (L) 80.0 - 100.0 fL    MCH 24.3 (L) 26.0 - 34.0 pg    MCHC 30.7 (L) 31.0 - 36.0 g/dL    RDW 18.2 (H) 12.4 - 15.4 %    Platelets 523 (H) 346 - 450 K/uL    MPV 7.0 5.0 - 10.5 fL    Neutrophils % 87.1 %    Lymphocytes % 7.1 %    Monocytes % 3.9 %    Eosinophils % 1.2 %    Basophils % 0.7 %    Neutrophils # 14.6 (H) 1.7 - 7.7 K/uL    Lymphocytes # 1.2 1.0 - 5.1 K/uL    Monocytes # 0.6 0.0 - 1.3 K/uL    Eosinophils # 0.2 0.0 - 0.6 K/uL    Basophils # 0.1 0.0 - 0.2 K/uL   Troponin   Result Value Ref Range    Troponin 0.10 (H) <0.01 ng/mL   Brain Natriuretic Peptide   Result Value Ref Range    Pro-BNP 1,589 (H) 0 - 449 pg/mL   Blood Gas, Venous   Result Value Ref Range    pH, Dejuan 7.241 (L) 7.350 - 7.450    pCO2, Deujan 64.9 (H) 40.0 - 50.0 mmHg    pO2, Dejuan 24.0 (L) 25.0 - 40.0 mmHg    HCO3, Venous 27.3 23.0 - 29.0 mmol/L    Base Excess, Dejuan -1.4 -3.0 - 3.0 mmol/L    O2 Sat, Dejuan 33 Not Established %    Carboxyhemoglobin 3.4 (H) 0.0 - 1.5 %    MetHgb, Dejuan 0.3 <1.5 %    TC02 (Calc), Dejuan 29 Not Established mmol/L    O2 Content, Dejuan 6 Not Established VOL %    O2 Therapy Unknown      Radiographs:  Xr Chest Portable    Result Date: 4/10/2019  EXAMINATION: SINGLE XRAY VIEW OF THE CHEST 4/10/2019 4:52 am COMPARISON: None.  HISTORY: ORDERING SYSTEM PROVIDED HISTORY: SOB TECHNOLOGIST PROVIDED HISTORY: Reason for exam:->SOB Ordering Physician Provided Reason for Exam: sob Type of Exam: Initial FINDINGS: There is multifocal airspace disease monitoring for potential decompensation. Interventions were documented as above. ED Medication Orders (From admission, onward)    Start Ordered     Status Ordering Provider    04/10/19 0530 04/10/19 0507  levofloxacin (LEVAQUIN) 750 MG/150ML infusion 750 mg  LexiePatria Gibson Wolf    04/10/19 0515 04/10/19 0445  ipratropium-albuterol (DUONEB) nebulizer solution 1 ampule  ONCE      Last MAR action:  Given - by Shelly Thomas on 04/10/19 at [address]    04/10/19 0515 04/10/19 0445  ipratropium-albuterol (DUONEB) nebulizer solution 1 ampule  ONCE      Last MAR action:  Given - by Shelly Thomas on 04/10/19 at [address]    04/10/19 0515 04/10/19 0445  ipratropium-albuterol (DUONEB) nebulizer solution 1 ampule  ONCE      Last MAR action:  Given - by Shelly Thomas on 04/10/19 at [address]          Final Impression      1. Pneumonia due to organism    2. Hypoxia    3. Respiratory distress    4. Leukocytosis, unspecified type    5.  Elevated troponin      DISPOSITION Decision To Admit   (Please note that portions of this note may have been completed with a voice recognition program. Efforts were made to edit thedictations but occasionally words are mis-transcribed.)    Gabrielle Ji, [facility] [address], DO  04/10/19 0946

## 2019-04-11 LAB
ANION GAP SERPL CALCULATED.3IONS-SCNC: 15 MMOL/L (ref 3–16)
BASOPHILS ABSOLUTE: 0 K/UL (ref 0–0.2)
BASOPHILS RELATIVE PERCENT: 0.1 %
BUN BLDV-MCNC: 49 MG/DL (ref 7–20)
CALCIUM SERPL-MCNC: 8.7 MG/DL (ref 8.3–10.6)
CHLORIDE BLD-SCNC: 101 MMOL/L (ref 99–110)
CO2: 21 MMOL/L (ref 21–32)
CREAT SERPL-MCNC: 2.4 MG/DL (ref 0.8–1.3)
EKG ATRIAL RATE: 70 BPM
EKG DIAGNOSIS: NORMAL
EKG Q-T INTERVAL: 448 MS
EKG QRS DURATION: 124 MS
EKG QTC CALCULATION (BAZETT): 483 MS
EKG R AXIS: 266 DEGREES
EKG T AXIS: 40 DEGREES
EKG VENTRICULAR RATE: 70 BPM
EOSINOPHILS ABSOLUTE: 0 K/UL (ref 0–0.6)
EOSINOPHILS RELATIVE PERCENT: 0 %
ESTIMATED AVERAGE GLUCOSE: 260.4 MG/DL
GFR AFRICAN AMERICAN: 31
GFR NON-AFRICAN AMERICAN: 26
GLUCOSE BLD-MCNC: 268 MG/DL (ref 70–99)
GLUCOSE BLD-MCNC: 274 MG/DL (ref 70–99)
GLUCOSE BLD-MCNC: 279 MG/DL (ref 70–99)
GLUCOSE BLD-MCNC: 284 MG/DL (ref 70–99)
GLUCOSE BLD-MCNC: 284 MG/DL (ref 70–99)
GLUCOSE BLD-MCNC: 296 MG/DL (ref 70–99)
HBA1C MFR BLD: 10.7 %
HCT VFR BLD CALC: 31.6 % (ref 40.5–52.5)
HEMOGLOBIN: 10 G/DL (ref 13.5–17.5)
LYMPHOCYTES ABSOLUTE: 0.6 K/UL (ref 1–5.1)
LYMPHOCYTES RELATIVE PERCENT: 3 %
MCH RBC QN AUTO: 24.9 PG (ref 26–34)
MCHC RBC AUTO-ENTMCNC: 31.7 G/DL (ref 31–36)
MCV RBC AUTO: 78.6 FL (ref 80–100)
MONOCYTES ABSOLUTE: 0.8 K/UL (ref 0–1.3)
MONOCYTES RELATIVE PERCENT: 3.7 %
NEUTROPHILS ABSOLUTE: 19.3 K/UL (ref 1.7–7.7)
NEUTROPHILS RELATIVE PERCENT: 93.2 %
PDW BLD-RTO: 17.5 % (ref 12.4–15.4)
PERFORMED ON: ABNORMAL
PLATELET # BLD: 316 K/UL (ref 135–450)
PMV BLD AUTO: 6.9 FL (ref 5–10.5)
POTASSIUM REFLEX MAGNESIUM: 4.7 MMOL/L (ref 3.5–5.1)
RBC # BLD: 4.02 M/UL (ref 4.2–5.9)
SODIUM BLD-SCNC: 137 MMOL/L (ref 136–145)
VANCOMYCIN RANDOM: 12.5 UG/ML
WBC # BLD: 20.7 K/UL (ref 4–11)

## 2019-04-11 PROCEDURE — 2580000003 HC RX 258: Performed by: PHYSICIAN ASSISTANT

## 2019-04-11 PROCEDURE — 93010 ELECTROCARDIOGRAM REPORT: CPT | Performed by: INTERNAL MEDICINE

## 2019-04-11 PROCEDURE — 6370000000 HC RX 637 (ALT 250 FOR IP)

## 2019-04-11 PROCEDURE — 6370000000 HC RX 637 (ALT 250 FOR IP): Performed by: INTERNAL MEDICINE

## 2019-04-11 PROCEDURE — 6360000002 HC RX W HCPCS: Performed by: HOSPITALIST

## 2019-04-11 PROCEDURE — 2580000003 HC RX 258: Performed by: INTERNAL MEDICINE

## 2019-04-11 PROCEDURE — 80048 BASIC METABOLIC PNL TOTAL CA: CPT

## 2019-04-11 PROCEDURE — 6370000000 HC RX 637 (ALT 250 FOR IP): Performed by: HOSPITALIST

## 2019-04-11 PROCEDURE — 2700000000 HC OXYGEN THERAPY PER DAY

## 2019-04-11 PROCEDURE — 94640 AIRWAY INHALATION TREATMENT: CPT

## 2019-04-11 PROCEDURE — 94150 VITAL CAPACITY TEST: CPT

## 2019-04-11 PROCEDURE — 99291 CRITICAL CARE FIRST HOUR: CPT | Performed by: INTERNAL MEDICINE

## 2019-04-11 PROCEDURE — 6360000002 HC RX W HCPCS: Performed by: INTERNAL MEDICINE

## 2019-04-11 PROCEDURE — 99233 SBSQ HOSP IP/OBS HIGH 50: CPT | Performed by: INTERNAL MEDICINE

## 2019-04-11 PROCEDURE — 1200000000 HC SEMI PRIVATE

## 2019-04-11 PROCEDURE — 80202 ASSAY OF VANCOMYCIN: CPT

## 2019-04-11 PROCEDURE — 2580000003 HC RX 258: Performed by: HOSPITALIST

## 2019-04-11 PROCEDURE — 94761 N-INVAS EAR/PLS OXIMETRY MLT: CPT

## 2019-04-11 PROCEDURE — 85025 COMPLETE CBC W/AUTO DIFF WBC: CPT

## 2019-04-11 PROCEDURE — 36415 COLL VENOUS BLD VENIPUNCTURE: CPT

## 2019-04-11 RX ORDER — METHYLPREDNISOLONE SODIUM SUCCINATE 40 MG/ML
40 INJECTION, POWDER, LYOPHILIZED, FOR SOLUTION INTRAMUSCULAR; INTRAVENOUS EVERY 24 HOURS
Status: DISCONTINUED | OUTPATIENT
Start: 2019-04-12 | End: 2019-04-12

## 2019-04-11 RX ORDER — DIMETHICONE, OXYBENZONE, AND PADIMATE O 2; 2.5; 6.6 G/100G; G/100G; G/100G
STICK TOPICAL
Status: COMPLETED
Start: 2019-04-11 | End: 2019-04-11

## 2019-04-11 RX ORDER — OXYCODONE HYDROCHLORIDE 5 MG/1
5 TABLET ORAL EVERY 6 HOURS PRN
Status: DISCONTINUED | OUTPATIENT
Start: 2019-04-11 | End: 2019-04-16 | Stop reason: HOSPADM

## 2019-04-11 RX ORDER — IPRATROPIUM BROMIDE AND ALBUTEROL SULFATE 2.5; .5 MG/3ML; MG/3ML
1 SOLUTION RESPIRATORY (INHALATION) 3 TIMES DAILY
Status: DISCONTINUED | OUTPATIENT
Start: 2019-04-11 | End: 2019-04-16

## 2019-04-11 RX ORDER — INSULIN GLARGINE 100 [IU]/ML
10 INJECTION, SOLUTION SUBCUTANEOUS NIGHTLY
Status: DISCONTINUED | OUTPATIENT
Start: 2019-04-11 | End: 2019-04-16 | Stop reason: HOSPADM

## 2019-04-11 RX ADMIN — INSULIN GLARGINE 10 UNITS: 100 INJECTION, SOLUTION SUBCUTANEOUS at 21:36

## 2019-04-11 RX ADMIN — IPRATROPIUM BROMIDE AND ALBUTEROL SULFATE 1 AMPULE: .5; 3 SOLUTION RESPIRATORY (INHALATION) at 19:40

## 2019-04-11 RX ADMIN — Medication: at 05:50

## 2019-04-11 RX ADMIN — SODIUM CHLORIDE: 9 INJECTION, SOLUTION INTRAVENOUS at 15:08

## 2019-04-11 RX ADMIN — Medication 10 ML: at 07:50

## 2019-04-11 RX ADMIN — GUAIFENESIN 600 MG: 600 TABLET, EXTENDED RELEASE ORAL at 09:31

## 2019-04-11 RX ADMIN — INSULIN LISPRO 9 UNITS: 100 INJECTION, SOLUTION INTRAVENOUS; SUBCUTANEOUS at 16:30

## 2019-04-11 RX ADMIN — IPRATROPIUM BROMIDE AND ALBUTEROL SULFATE 1 AMPULE: .5; 3 SOLUTION RESPIRATORY (INHALATION) at 00:38

## 2019-04-11 RX ADMIN — CILOSTAZOL 100 MG: 100 TABLET ORAL at 09:31

## 2019-04-11 RX ADMIN — IPRATROPIUM BROMIDE AND ALBUTEROL SULFATE 1 AMPULE: .5; 3 SOLUTION RESPIRATORY (INHALATION) at 15:13

## 2019-04-11 RX ADMIN — IPRATROPIUM BROMIDE AND ALBUTEROL SULFATE 1 AMPULE: .5; 3 SOLUTION RESPIRATORY (INHALATION) at 07:53

## 2019-04-11 RX ADMIN — CILOSTAZOL 100 MG: 100 TABLET ORAL at 21:33

## 2019-04-11 RX ADMIN — INSULIN LISPRO 9 UNITS: 100 INJECTION, SOLUTION INTRAVENOUS; SUBCUTANEOUS at 11:10

## 2019-04-11 RX ADMIN — METHYLPREDNISOLONE SODIUM SUCCINATE 40 MG: 40 INJECTION, POWDER, FOR SOLUTION INTRAMUSCULAR; INTRAVENOUS at 07:49

## 2019-04-11 RX ADMIN — INSULIN LISPRO 9 UNITS: 100 INJECTION, SOLUTION INTRAVENOUS; SUBCUTANEOUS at 07:50

## 2019-04-11 RX ADMIN — GABAPENTIN 100 MG: 100 CAPSULE ORAL at 09:31

## 2019-04-11 RX ADMIN — OXYCODONE HYDROCHLORIDE 5 MG: 5 TABLET ORAL at 14:47

## 2019-04-11 RX ADMIN — OXYCODONE HYDROCHLORIDE 5 MG: 5 TABLET ORAL at 05:45

## 2019-04-11 RX ADMIN — IPRATROPIUM BROMIDE AND ALBUTEROL SULFATE 1 AMPULE: .5; 3 SOLUTION RESPIRATORY (INHALATION) at 04:22

## 2019-04-11 RX ADMIN — VANCOMYCIN HYDROCHLORIDE 500 MG: 500 INJECTION, POWDER, LYOPHILIZED, FOR SOLUTION INTRAVENOUS at 11:06

## 2019-04-11 RX ADMIN — IPRATROPIUM BROMIDE AND ALBUTEROL SULFATE 1 AMPULE: .5; 3 SOLUTION RESPIRATORY (INHALATION) at 11:16

## 2019-04-11 RX ADMIN — CEFEPIME 2 G: 2 INJECTION, POWDER, FOR SOLUTION INTRAVENOUS at 13:08

## 2019-04-11 RX ADMIN — ATORVASTATIN CALCIUM 40 MG: 40 TABLET, FILM COATED ORAL at 09:30

## 2019-04-11 RX ADMIN — ENOXAPARIN SODIUM 30 MG: 100 INJECTION SUBCUTANEOUS at 07:50

## 2019-04-11 RX ADMIN — ASPIRIN 81 MG 81 MG: 81 TABLET ORAL at 09:31

## 2019-04-11 RX ADMIN — PANTOPRAZOLE SODIUM 40 MG: 40 TABLET, DELAYED RELEASE ORAL at 05:54

## 2019-04-11 RX ADMIN — INSULIN LISPRO 5 UNITS: 100 INJECTION, SOLUTION INTRAVENOUS; SUBCUTANEOUS at 21:37

## 2019-04-11 RX ADMIN — Medication 10 ML: at 21:33

## 2019-04-11 ASSESSMENT — PAIN DESCRIPTION - DESCRIPTORS
DESCRIPTORS: STABBING;SHARP
DESCRIPTORS: ACHING

## 2019-04-11 ASSESSMENT — PAIN DESCRIPTION - PAIN TYPE
TYPE: ACUTE PAIN
TYPE: ACUTE PAIN

## 2019-04-11 ASSESSMENT — PAIN DESCRIPTION - FREQUENCY
FREQUENCY: INTERMITTENT
FREQUENCY: INTERMITTENT

## 2019-04-11 ASSESSMENT — PAIN DESCRIPTION - ORIENTATION
ORIENTATION: RIGHT
ORIENTATION: RIGHT

## 2019-04-11 ASSESSMENT — PAIN SCALES - WONG BAKER
WONGBAKER_NUMERICALRESPONSE: 0
WONGBAKER_NUMERICALRESPONSE: 6

## 2019-04-11 ASSESSMENT — PAIN DESCRIPTION - LOCATION
LOCATION: ANKLE
LOCATION: ANKLE

## 2019-04-11 ASSESSMENT — PAIN DESCRIPTION - ONSET
ONSET: ON-GOING
ONSET: ON-GOING

## 2019-04-11 ASSESSMENT — PAIN SCALES - GENERAL
PAINLEVEL_OUTOF10: 9
PAINLEVEL_OUTOF10: 6

## 2019-04-11 NOTE — PROGRESS NOTES
04/11/19 0427   NIV Type   Equipment Type V60   Mode BIPAP   Mask Type Full face mask   Mask Size Large   Settings/Measurements   Comfort Level Good   Using Accessory Muscles No   IPAP 12 cmH20   EPAP 5 cmH2O   Rate Ordered 12   Resp 19   SpO2 99   FiO2  30 %   I Time/ I Time % 1 s   Vt Exhaled 683 mL   Mask Leak (lpm) 0 lpm   Skin Protection for O2 Device Yes   Breath Sounds   Right Upper Lobe Clear   Right Middle Lobe Clear   Right Lower Lobe Clear   Left Upper Lobe Clear   Left Lower Lobe Clear   Alarm Settings   Alarms On Y

## 2019-04-11 NOTE — PROGRESS NOTES
Spoke to [facility], for 15 minutes. Updated and answered questions to her satisfaction.

## 2019-04-11 NOTE — CARE COORDINATION
Case Management Assessment  Initial Evaluation    Date/Time of Evaluation: 4/11/2019 2:44 PM  Assessment Completed by: Elfego Rodriguez    Patient Name: Yusuf Hernandez  YOB: 1934  Diagnosis: HCAP (healthcare-associated pneumonia) [J18.9]  Acute hypoxemic respiratory failure ([address] [J96.01]  Date / Time: 4/10/2019  4:40 AM  Admission status/Date: Inpatient 4/10/2019  Chart Reviewed: Yes      Patient Interviewed: Yes   Family Interviewed:  No      Hospitalization in the last 30 days:  No    Contacts  :   Shahramant De La Rosa  Relationship to Patient:  daughter  Phone Number:   [phone]  Alternate Contact:   Jose Eduardo Rodriguez  Relationship to Patient:   othr  Phone Number:    [phone]  Met with: patient    Current PCP. Dr. Yamilka Edgar required for SNF :  N        3 night stay required - Y    ADLS  Support Systems:    Transportation: EMS transportation    Meal Preparation: per facility    Housing  Home Environment: [facility]  Steps: NA  Plans to Return to Present Housing: yes  Other Identified Issues: NA    Home Care Information  Currently active with [address] : No     Passport/Waiver : No  :                      Phone Number:    Passport/Waiver Services: NA          Durable Medical Equipment   DME Provider:   Equipment: Walker__Cane__RTS__ BSC__Shower Chair__  02__ HHN__ CPAP__  BiPap__  Hospital Bed__ W/C___ Other__________      Has Home O2 in place on admit:  No  Informed of need to bring portable home O2 tank on day of discharge for nursing to connect prior to leaving:   No  Verbalized agreement/Understanding:   No    Community Service Affiliation  Dialysis:  No    · Name:  · Location  · Dialysis Schedule:  · Phone:   · Fax: Outpatient PT/OT: No    Cancer Center: No     CHF Clinic: No     Pulmonary Rehab: No  Pain Clinic: No  Community Mental Health: No    Wound Clinic: No     Other: NA    DISCHARGE PLAN: Chart reviewed.  Met with pt at bedside and explained the role of the CM. +LTC at [facility] [place].  [place]/ [place]ina [place] [facility] to verify bed hold. Await return call. +eCOC, +CM following    Explained Case Management role/services. Addendum 4/11/19 1630:  Cecille Nicolas with [facility] stated that pt is a bed hold.

## 2019-04-11 NOTE — CONSULTS
[id number] [facility] Wound Ostomy Continence Nurse  Consult Note       NAME:  Kajal Guerrero  MEDICAL RECORD NUMBER:  [id number]  AGE: 80 y.o. GENDER: male  : 1934  TODAY'S DATE:  2019    Subjective  My right lower leg hurts a lot    Reason for WOCN Evaluation and Assessment: R outer ankle wounds (2)      Kajal Guerrero is a 80 y.o. male referred by:   [] Physician  [x] Nursing  [] Other:     Wound Identification:  Wound Type: Non-healing ulcer right lateral malleolus. Diabetic ulcer left great toe. Onychomycosis of toes (nails green)  Contributing Factors: diabetes, decreased mobility, shear force, obesity and decreased tissue oxygenation. Peripheral vascular disease, intermittent claudication      Wound History: admitted with wound care issues. Living in [address] Admitted for SOB. According to EMR, patient was being worked up for the right ankle wound. Had seen Dr Latrell Fisher podiatry on 3/6/19 and doppler studies that showed arterial Dopplers demonstrates very poor blood flow to the digits of the right and left foot. Dr Latrell Fisher recommended vascular and patient followed up with Dr Lidia Booth on 19.    Current Wound Care Treatment:  Foam dressing    Patient Goal of Care:  [x] Wound Healing  [] Odor Control  [] Palliative Care  [] Pain Control   [] Other:         PAST MEDICAL HISTORY        Diagnosis Date    AAA (abdominal aortic aneurysm) (HCC)     Acute constipation     Acute gastrointestinal hemorrhage     Bladder CA in situ     BPH (benign prostatic hyperplasia)     Constipation     COPD (chronic obstructive pulmonary disease) (HCC)     Coronary arteriosclerosis     Current mild episode of major depressive disorder (HCC)     Depression     Diabetes mellitus ([facility]     Diabetic neuropathy ([facility])     Diverticulitis of intestine w/o perforation or abscess with bleeding     GERD (gastroesophageal reflux disease)     GI hemorrhage     Hypercholesteremia     Hypertension     Hypoaldosteronism [facility])     Pacemaker     Postsurgical aortocoronary bypass status     Pure hypercholesterolemia     S/P cataract surgery     Ulcer of left foot ([address]     Vitamin D deficiency        PAST SURGICAL HISTORY    Past Surgical History:   Procedure Laterality Date    EYE SURGERY      PACEMAKER INSERTION         FAMILY HISTORY    History reviewed. No pertinent family history. SOCIAL HISTORY    Social History     Tobacco Use    Smoking status: Former Smoker    Smokeless tobacco: Never Used    Tobacco comment: prior to 2010   Substance Use Topics    Alcohol use: No    Drug use: No       ALLERGIES    Allergies   Allergen Reactions    No Known Allergies        MEDICATIONS    No current facility-administered medications on file prior to encounter. Current Outpatient Medications on File Prior to Encounter   Medication Sig Dispense Refill    chlorthalidone (HYGROTON) 25 MG tablet Take 25 mg by mouth daily      DULoxetine (CYMBALTA) 30 MG extended release capsule Take 30 mg by mouth daily      ferrous sulfate 325 (65 Fe) MG tablet Take 325 mg by mouth daily (with breakfast)      gabapentin (NEURONTIN) 100 MG capsule Take 100 mg by mouth daily. 300mg qhs      insulin glargine (LANTUS) 100 UNIT/ML injection vial Inject 3 Units into the skin nightly      saxagliptin (ONGLYZA) 5 MG TABS tablet Take 5 mg by mouth daily      polyethylene glycol (GLYCOLAX) packet Take 17 g by mouth Every Monday and Friday      vitamin D (ERGOCALCIFEROL) 93670 units CAPS capsule Take 10,000 Units by mouth once a week      cilostazol (PLETAL) 100 MG tablet Take 100 mg by mouth 2 times daily      insulin lispro (HUMALOG) 100 UNIT/ML injection vial Inject into the skin 3 times daily (before meals)      oxyCODONE 5 MG capsule Take 5 mg by mouth 2 times daily.  [id number] PRN as well      budesonide-formoterol (SYMBICORT) 80-4.5 MCG/ACT AERO Inhale 2 puffs into the lungs 2 times daily      gabapentin (NEURONTIN) 300 MG capsule Take 300 mg by mouth nightly.  glipiZIDE (GLUCOTROL) 5 MG tablet Take 5 mg by mouth Daily with supper      dextromethorphan-guaiFENesin (MUCINEX DM)  MG per extended release tablet Take 1 tablet by mouth every 12 hours as needed      guaiFENesin (MUCINEX) 600 MG extended release tablet Take 600 mg by mouth 2 times daily      glipiZIDE (GLUCOTROL) 10 MG tablet glipizide 10 mg tablet      nitroGLYCERIN (NITROSTAT) 0.4 MG SL tablet nitroglycerin 0.4 mg sublingual tablet      metoprolol succinate (TOPROL XL) 25 MG extended release tablet Toprol XL 25 mg tablet,extended release   Take 1 tablet every day by oral route.  atorvastatin (LIPITOR) 40 MG tablet atorvastatin 40 mg tablet      aspirin 81 MG chewable tablet aspirin 81 mg chewable tablet   Chew 1 tablet every day by oral route.  omeprazole (PRILOSEC) 40 MG delayed release capsule omeprazole 40 mg capsule,delayed release      ondansetron (ZOFRAN) 4 MG tablet ondansetron HCl 4 mg tablet      finasteride (PROSCAR) 5 MG tablet finasteride 5 mg tablet      albuterol (PROVENTIL) (2.5 MG/3ML) 0.083% nebulizer solution albuterol sulfate 2.5 mg/3 mL (0.083 %) solution for nebulization         Objective  Getting ready to transfer out of icu    BP (!) 134/50   Pulse 80   Temp 97.9 °F (36.6 °C)   Resp 15   Ht 5' 7\" (1.702 m)   Wt 180 lb (81.6 kg)   SpO2 93%   BMI 28.19 kg/m²     LABS:  WBC:    Lab Results   Component Value Date    WBC 20.7 04/11/2019     H/H:    Lab Results   Component Value Date    HGB 10.0 04/11/2019    HCT 31.6 04/11/2019     PTT:  No results found for: APTT, PTT[APTT}  PT/INR:  No results found for: PROTIME, INR  HgBA1c:    Lab Results   Component Value Date    LABA1C 10.7 04/10/2019       Assessment  Right lower leg malleolus and above malleolus yellow wound bed.   Left great toe brown   Lucien Risk Score: Lucien Scale Score: 15    Patient Active Problem List   Diagnosis    HCAP (healthcare-associated pneumonia)    Acute hypoxemic respiratory failure (HCC)    MARCOS ([facility]) ([facility]    Elevated troponin    COPD (chronic obstructive pulmonary disease) (HCC)    Hypertension    Hypercholesteremia    GERD (gastroesophageal reflux disease)    Diabetes mellitus ([facility]    Pneumonia due to organism       Measurements:  Wound 04/11/19 Ankle Right;Lateral cluster of 2 yellow wound base (Active)   Wound Image   4/11/2019  2:57 PM   Wound Venous 4/11/2019  2:57 PM   Dressing Status Clean;Dry; Intact 4/11/2019  2:57 PM   Dressing Changed Other (Comment) 4/11/2019  2:57 PM   Dressing/Treatment Foam 4/11/2019  2:57 PM   Wound Cleansed Not Cleansed 4/11/2019  2:57 PM   Dressing Change Due 04/11/19 4/11/2019  2:57 PM   Wound Length (cm) 7 cm 4/11/2019  2:57 PM   Wound Width (cm) 2 cm 4/11/2019  2:57 PM   Wound Depth (cm) 0 cm 4/11/2019  2:57 PM   Wound Surface Area (cm^2) 14 cm^2 4/11/2019  2:57 PM   Wound Volume (cm^3) 0 cm^3 4/11/2019  2:57 PM   Distance Tunneling (cm) 0 cm 4/11/2019  2:57 PM   Tunneling Position ___ O'Clock 0 4/11/2019  2:57 PM   Undermining Starts ___ O'Clock 0 4/11/2019  2:57 PM   Undermining Ends___ O'Clock 0 4/11/2019  2:57 PM   Undermining Maxium Distance (cm) 0 4/11/2019  2:57 PM   Wound Assessment Yellow; Tan 4/11/2019  2:57 PM   Drainage Amount Small 4/11/2019  2:57 PM   Drainage Description Yellow 4/11/2019  2:57 PM   Odor None 4/11/2019  2:57 PM   Margins Attached edges; Defined edges 4/11/2019  2:57 PM   Josee-wound Assessment Red; Swelling 4/11/2019  2:57 PM   Yellow%Wound Bed 80 4/11/2019  2:57 PM   Other%Wound Bed 20% tan 4/11/2019  2:57 PM   Culture Taken No 4/11/2019  2:57 PM   Number of days: 0      Right lateral ankle:           Wound 04/11/19 Toe (Comment  which one) Left;Dorsal (Active)   Wound Image   4/11/2019  2:57 PM   Wound Diabetic 4/11/2019  2:57 PM   Dressing/Treatment Open to air 4/11/2019  2:57 PM   Wound Length (cm) 1 cm 4/11/2019  2:57 PM   Wound Width (cm) 1 cm 4/11/2019  2:57 PM   Wound Depth (cm) 0 cm 4/11/2019  2:57 PM   Wound Surface Area (cm^2) 1 cm^2 4/11/2019  2:57 PM   Wound Volume (cm^3) 0 cm^3 4/11/2019  2:57 PM   Tunneling Position ___ O'Clock 0 4/11/2019  2:57 PM   Undermining Starts ___ O'Clock 0 4/11/2019  2:57 PM   Undermining Ends___ O'Clock 0 4/11/2019  2:57 PM   Undermining Maxium Distance (cm) 0 4/11/2019  2:57 PM   Wound Assessment Brown 4/11/2019  2:57 PM   Drainage Amount None 4/11/2019  2:57 PM   Odor None 4/11/2019  2:57 PM   Margins Attached edges; Defined edges 4/11/2019  2:57 PM   Other%Wound Bed 100% brown 4/11/2019  2:57 PM   Number of days: 0        Left great toe:      Right foot toe nails:          Left foot toe nails:          Response to treatment:  Well tolerated by patient. Pain Assessment:  Severity:  6 / 10  Quality of pain: sharp  Wound Pain Timing/Severity: constant  Premedicated: Yes    Plan   Plan of Care: Wound 04/11/19 Ankle Right;Lateral cluster of 2 yellow wound base-Dressing/Treatment: Foam  Wound 04/11/19 Toe (Comment  which one) Left;Dorsal-Dressing/Treatment: Open to air     Recommend:  Clean wounds with NS. Apply santyl to right lateral ankle daily, cover with dry dressing and roll guaze. Paint betadine to left great toe daily. Wound care to follow. Call wound care for deterioration. Recommend podiatry to trim nails and order medication for fungus in toe nails.       Specialty Bed Required : Yes   [] Low Air Loss   [] Pressure Redistribution  [] Fluid Immersion  [] Bariatric  [] Total Pressure Relief  [] Other:     Current Diet: DIET CARB CONTROL; Dietician consult:  Yes    Discharge Plan:  Placement for patient upon discharge: intermediate care facility    Patient appropriate for Outpatient [address]: Yes    Referrals:  []   [] [facility]  [] Supplies  [] Other    Patient/Caregiver Teaching: spoke to patient and wife.   Will discuss treatment with MD.  Level of patient/caregiver understanding able to:   [x] Indicates understanding       [x] Needs reinforcement  [] Unsuccessful      [] Verbal Understanding  [] Demonstrated understanding       [] No evidence of learning  [] Refused teaching         [] N/A       Electronically signed by Nick Song RN, MSN, Alfredo Cat on 4/11/2019 at 3:03 PM

## 2019-04-11 NOTE — DISCHARGE INSTR - COC
Pneumonia due to organism J18.9       Isolation/Infection:   Isolation          No Isolation            Nurse Assessment:  Last Vital Signs: BP (!) 134/50   Pulse 79   Temp 97.9 °F (36.6 °C)   Resp 18   Ht 5' 7\" (1.702 m)   Wt 180 lb (81.6 kg)   SpO2 92%   BMI 28.19 kg/m²     Last documented pain score (0-10 scale): Pain Level: 6  Last Weight:   Wt Readings from Last 1 Encounters:   04/10/19 180 lb (81.6 kg)     Mental Status:  disoriented and oriented    IV Access:  - None    Nursing Mobility/ADLs:  Walking   Assisted  Transfer  Assisted  Bathing  Assisted  Dressing  Assisted  Toileting  Assisted  Feeding  Assisted  Med Admin  Assisted  Med Delivery   whole    Wound Care Documentation and Therapy:        Elimination:  Continence:   · Bowel: Yes  · Bladder: Yes  Urinary Catheter: None   Colostomy/Ileostomy/Ileal Conduit: No       Date of Last BM:     Intake/Output Summary (Last 24 hours) at 4/11/2019 1450  Last data filed at 4/11/2019 1305  Gross per 24 hour   Intake 2097.1 ml   Output 760 ml   Net 1337.1 ml     I/O last 3 completed shifts: In: 935.1 [I.V.:885.1; IV Piggyback:50]  Out: 825 [Urine:825]    Safety Concerns: At Risk for Falls    Impairments/Disabilities:      None    Nutrition Therapy:  Current Nutrition Therapy:   - Oral Diet:  Carb Control 4 carbs/meal (1800kcals/day)    Routes of Feeding: Oral  Liquids: Thin Liquids  Daily Fluid Restriction: no  Last Modified Barium Swallow with Video (Video Swallowing Test): not done    Treatments at the Time of Hospital Discharge:   Respiratory Treatments:   Oxygen Therapy:  is on oxygen at 3 L/min per nasal cannula.   Ventilator:    - No ventilator support    Rehab Therapies: nursing  Weight Bearing Status/Restrictions: No weight bearing restirctions  Other Medical Equipment (for information only, NOT a DME order):  wheelchair  Other Treatments:     Patient's personal belongings (please select all that are sent with patient):  None    RN SIGNATURE: Electronically signed by Josh Crouch RN on 4/16/19 at 1:29 PM    CASE MANAGEMENT/SOCIAL WORK SECTION    Inpatient Status Date: 4/10/2019    Readmission Risk Assessment Score:  Readmission Risk              Risk of Unplanned Readmission:        19           Discharging to Facility/ Agency     · Name: [facility]   · Address: [address] [place] [address] [address]   · Phone: [phone]  · Fax: [phone]  ·         / signature: Electronically signed by Pastor Schumacher RN on 4/16/19 at 12:43 PM    PHYSICIAN SECTION    Prognosis: Fair    Condition at Discharge: Stable    Rehab Potential (if transferring to Rehab): Fair    Recommended Labs or Other Treatments After Discharge: none    Physician Certification: I certify the above information and transfer of Josh Goon  is necessary for the continuing treatment of the diagnosis listed and that he requires [place] for less 30 days.      Update Admission H&P: No change in H&P    PHYSICIAN SIGNATURE:  Electronically signed by Frantz Sanches MD on 4/16/19 at 1:36 PM

## 2019-04-11 NOTE — PROGRESS NOTES
Progress Note    Admit Date:  4/10/2019    Subjective:  Mr. Bree De Anda is on RA. He feels better. Objective:   Patient Vitals for the past 4 hrs:   BP Temp Pulse Resp SpO2   04/11/19 1106 114/60 97.9 °F (36.6 °C) 70 18 98 %   04/11/19 1000 (!) 117/55 -- 70 12 97 %            Intake/Output Summary (Last 24 hours) at 4/11/2019 1344  Last data filed at 4/11/2019 1305  Gross per 24 hour   Intake 2224.1 ml   Output 1110 ml   Net 1114.1 ml       Physical Exam:    Gen: Elderly male. No distress. Alert. Eyes: PERRL. No sclera icterus. No conjunctival injection. ENT: No discharge. Pharynx clear. Neck: No JVD. Trachea midline. Resp: No accessory muscle use. No crackles. No wheezes. No rhonchi. CV: Regular rate. Regular rhythm. No murmur. No rub. No edema. GI: Non-tender. Non-distended. Normal bowel sounds. Skin: Warm and dry. No nodule on exposed extremities. No rash on exposed extremities. M/S: No cyanosis. No joint deformity. No clubbing. Neuro: Awake. Grossly nonfocal    Psych: Oriented x 3. No anxiety or agitation.        Scheduled Meds:   insulin glargine  10 Units Subcutaneous Nightly    [START ON 4/12/2019] methylPREDNISolone sodium  40 mg Intravenous Q24H    aspirin  81 mg Oral Daily    atorvastatin  40 mg Oral Daily    cilostazol  100 mg Oral BID    gabapentin  100 mg Oral Daily    guaiFENesin  600 mg Oral Daily    pantoprazole  40 mg Oral QAM AC    vitamin D  50,000 Units Oral Weekly    sodium chloride flush  10 mL Intravenous 2 times per day    enoxaparin  30 mg Subcutaneous Daily    insulin lispro  0-18 Units Subcutaneous TID WC    insulin lispro  0-9 Units Subcutaneous Nightly    cefepime  2 g Intravenous Q24H    vancomycin (VANCOCIN) intermittent dosing (placeholder)   Other RX Placeholder    ipratropium-albuterol  1 ampule Inhalation Q4H       Continuous Infusions:   sodium chloride 75 mL/hr at 04/10/19 2346    dextrose      dexmedetomidine (PRECEDEX) IV infusion Stopped (04/10/19 1712)       PRN Meds:  oxyCODONE, nitroGLYCERIN, sodium chloride flush, magnesium hydroxide, ondansetron, glucose, dextrose, glucagon (rDNA), dextrose, ondansetron      Data:  CBC:   Recent Labs     04/10/19  0449 04/11/19  0434   WBC 16.8* 20.7*   HGB 12.7* 10.0*   HCT 41.3 31.6*   MCV 79.1* 78.6*   * 316     BMP:   Recent Labs     04/10/19  0449 04/11/19  0433    137   K 4.4 4.7   CL 97* 101   CO2 27 21   BUN 39* 49*   CREATININE 2.0* 2.4*     LIVER PROFILE:   Recent Labs     04/10/19  0449   AST 15   ALT 11   BILITOT 0.7   ALKPHOS 97     PT/INR: No results for input(s): PROTIME, INR in the last 72 hours. CULTURES  Blood: NGTD  Rapid flu: negative      RADIOLOGY  XR CHEST PORTABLE   Final Result   Multifocal bilateral pneumonia with bilateral pleural effusions. Tulio Ramirez have reviewed the chart on [facility] Wright and personally interviewed and examined patient, reviewed the data (labs and imaging) and after discussion with my PA formulated the plan. Agree with note with the following edits. HPI:      Patient is much better. He is off Bipap and on room air. I reviewed the patient's Past Medical History, Past Surgical History, Medications, and Allergies. Physical exam:    BP (!) 134/50   Pulse 80   Temp 97.9 °F (36.6 °C)   Resp 15   Ht 5' 7\" (1.702 m)   Wt 180 lb (81.6 kg)   SpO2 93%   BMI 28.19 kg/m²     Gen: No distress. Alert. Eyes: PERRL. No sclera icterus. No conjunctival injection. ENT: No discharge. Pharynx clear. Neck: Trachea midline. Normal thyroid. Resp: No accessory muscle use. No crackles. + wheezes. + rhonchi. No dullness on percussion. CV: Regular rate. Regular rhythm. No murmur or rub. No edema. Assessment/Plan:    #  Acute respiratory failure with hypoxia:  Patient was placed on BiPAP. He improved. He is now on RA.   Respiratory failure resolved.       #  Healthcare associated pneumonia due to gram-negative organism or MRSA. Patient is a nursing home resident. The patient started on vancomycin and cefepime D#2. Cultures are NG so far     # Acute kidney injury   - possibly from prerenal azotemia. - Crt was 2.0 on admission. He was placed on IV fluids. His Crt has increased to 2.4  - increase rate of IV NS and cont to monitor BMP   - baseline is not clear.       # Nonspecific elevation of troponin. No ACS.  Due to respiratory issues.     #  COPD exacerbation  - solumedrol for now  - inhaled bronchodilators      #  Hypertension   - blood pressure stable, home HTN meds held      #  Diabetes mellitus type 2  - hold orals and use insulin    DVT Prophylaxis: Lovenox   Diet: DIET CARB CONTROL;  Code Status: Full Code    Dispo: transfer to [address] [place] [address] VICKY  4/11/2019 1:44 PM      Andres Munson 4/11/2019 3:14 PM

## 2019-04-11 NOTE — PROGRESS NOTES
4 Eyes Skin Assessment     The patient is being assess for   Transfer    I agree that 2 RN's have performed a thorough Head to Toe Skin Assessment on the patient. ALL assessment sites listed below have been assessed. Areas assessed by both nurses:   [x]   Head, Face, and Ears   [x]   Shoulders, Back, and Chest, Abdomen  [x]   Arms, Elbows, and Hands   [x]   Coccyx, Sacrum, and Ischium  [x]   Legs, Feet, and Heels        Noted large scab to left great toe, 2 open areas to right outer ankle with dressing intact, scattered scabs and bruises. **SHARE this note so that the co-signing nurse is able to place an eSignature**    Co-signer eSignature: Electronically signed by Royal Mejia RN on 4/11/19 at 5:05 PM    Does the Patient have Skin Breakdown?   Yes [facility] WOUND CARE was Initiated documentation include the Josee-wound, Wound Assessment, Measurements, Dressing Treatment, Drainage, and Color\",          Lucien Prevention initiated:  Yes   Wound Care Orders initiated:  Yes      [address]  nurse consulted for Pressure Injury (Stage 3,4, Unstageable, DTI, NWPT, Complex wounds)and New or Established Ostomies:  Yes      Primary Nurse eSignature: Electronically signed by Sasha Matos RN on 4/11/19 at 4:54 PM

## 2019-04-11 NOTE — PROGRESS NOTES
Shift assessment complete. See flow sheet. Patient resting comfortably in bed on BiPAP. Complained of pain in R ankle. Scheduled oxycodone and pletal given with small sip of water. PIV x 2 flushed. NS infusing at 75 ml/h. Pt states no issues or complaints. Tolerating BiPAP. Call light in reach. Will follow.

## 2019-04-11 NOTE — PROGRESS NOTES
04/11/19 0042   NIV Type   Equipment Type V60   Mode BIPAP   Mask Type Full face mask   Mask Size Large   Settings/Measurements   Comfort Level Good   Using Accessory Muscles Yes   IPAP 12 cmH20   EPAP 5 cmH2O   Rate Ordered 14   Resp 21   SpO2 96   FiO2  30 %   I Time/ I Time % 1 s   Vt Exhaled 679 mL   Mask Leak (lpm) 0 lpm   Skin Protection for O2 Device Yes   Breath Sounds   Right Upper Lobe Clear   Right Middle Lobe Clear   Right Lower Lobe Clear   Left Upper Lobe Clear   Left Lower Lobe Clear   Alarm Settings   Alarms On Y

## 2019-04-12 LAB
ANION GAP SERPL CALCULATED.3IONS-SCNC: 15 MMOL/L (ref 3–16)
BASOPHILS ABSOLUTE: 0 K/UL (ref 0–0.2)
BASOPHILS RELATIVE PERCENT: 0.2 %
BUN BLDV-MCNC: 55 MG/DL (ref 7–20)
CALCIUM SERPL-MCNC: 8.7 MG/DL (ref 8.3–10.6)
CHLORIDE BLD-SCNC: 104 MMOL/L (ref 99–110)
CO2: 21 MMOL/L (ref 21–32)
CREAT SERPL-MCNC: 2.1 MG/DL (ref 0.8–1.3)
EOSINOPHILS ABSOLUTE: 0 K/UL (ref 0–0.6)
EOSINOPHILS RELATIVE PERCENT: 0 %
GFR AFRICAN AMERICAN: 37
GFR NON-AFRICAN AMERICAN: 30
GLUCOSE BLD-MCNC: 183 MG/DL (ref 70–99)
GLUCOSE BLD-MCNC: 197 MG/DL (ref 70–99)
GLUCOSE BLD-MCNC: 202 MG/DL (ref 70–99)
GLUCOSE BLD-MCNC: 294 MG/DL (ref 70–99)
GLUCOSE BLD-MCNC: 339 MG/DL (ref 70–99)
HCT VFR BLD CALC: 30.8 % (ref 40.5–52.5)
HEMOGLOBIN: 9.5 G/DL (ref 13.5–17.5)
LYMPHOCYTES ABSOLUTE: 0.7 K/UL (ref 1–5.1)
LYMPHOCYTES RELATIVE PERCENT: 3.9 %
MCH RBC QN AUTO: 24.6 PG (ref 26–34)
MCHC RBC AUTO-ENTMCNC: 31 G/DL (ref 31–36)
MCV RBC AUTO: 79.5 FL (ref 80–100)
MONOCYTES ABSOLUTE: 1.5 K/UL (ref 0–1.3)
MONOCYTES RELATIVE PERCENT: 7.9 %
NEUTROPHILS ABSOLUTE: 16.3 K/UL (ref 1.7–7.7)
NEUTROPHILS RELATIVE PERCENT: 88 %
PDW BLD-RTO: 18.2 % (ref 12.4–15.4)
PERFORMED ON: ABNORMAL
PLATELET # BLD: 324 K/UL (ref 135–450)
PMV BLD AUTO: 7.4 FL (ref 5–10.5)
POTASSIUM SERPL-SCNC: 4.1 MMOL/L (ref 3.5–5.1)
RBC # BLD: 3.88 M/UL (ref 4.2–5.9)
SODIUM BLD-SCNC: 140 MMOL/L (ref 136–145)
VANCOMYCIN TROUGH: 12.1 UG/ML (ref 10–20)
WBC # BLD: 18.5 K/UL (ref 4–11)

## 2019-04-12 PROCEDURE — 97166 OT EVAL MOD COMPLEX 45 MIN: CPT

## 2019-04-12 PROCEDURE — 97530 THERAPEUTIC ACTIVITIES: CPT

## 2019-04-12 PROCEDURE — 2700000000 HC OXYGEN THERAPY PER DAY

## 2019-04-12 PROCEDURE — 6370000000 HC RX 637 (ALT 250 FOR IP): Performed by: INTERNAL MEDICINE

## 2019-04-12 PROCEDURE — 2580000003 HC RX 258: Performed by: INTERNAL MEDICINE

## 2019-04-12 PROCEDURE — 6360000002 HC RX W HCPCS: Performed by: INTERNAL MEDICINE

## 2019-04-12 PROCEDURE — 80202 ASSAY OF VANCOMYCIN: CPT

## 2019-04-12 PROCEDURE — 99232 SBSQ HOSP IP/OBS MODERATE 35: CPT | Performed by: INTERNAL MEDICINE

## 2019-04-12 PROCEDURE — 0HBNXZZ EXCISION OF LEFT FOOT SKIN, EXTERNAL APPROACH: ICD-10-PCS | Performed by: PODIATRIST

## 2019-04-12 PROCEDURE — 85025 COMPLETE CBC W/AUTO DIFF WBC: CPT

## 2019-04-12 PROCEDURE — 94640 AIRWAY INHALATION TREATMENT: CPT

## 2019-04-12 PROCEDURE — 97162 PT EVAL MOD COMPLEX 30 MIN: CPT

## 2019-04-12 PROCEDURE — 99233 SBSQ HOSP IP/OBS HIGH 50: CPT | Performed by: INTERNAL MEDICINE

## 2019-04-12 PROCEDURE — 80048 BASIC METABOLIC PNL TOTAL CA: CPT

## 2019-04-12 PROCEDURE — 0HBRXZZ EXCISION OF TOE NAIL, EXTERNAL APPROACH: ICD-10-PCS | Performed by: PODIATRIST

## 2019-04-12 PROCEDURE — 97535 SELF CARE MNGMENT TRAINING: CPT

## 2019-04-12 PROCEDURE — 94761 N-INVAS EAR/PLS OXIMETRY MLT: CPT

## 2019-04-12 PROCEDURE — 36415 COLL VENOUS BLD VENIPUNCTURE: CPT

## 2019-04-12 PROCEDURE — 1200000000 HC SEMI PRIVATE

## 2019-04-12 RX ORDER — PREDNISONE 20 MG/1
40 TABLET ORAL DAILY
Status: DISCONTINUED | OUTPATIENT
Start: 2019-04-13 | End: 2019-04-14

## 2019-04-12 RX ORDER — GLIPIZIDE 5 MG/1
5 TABLET ORAL
Status: DISCONTINUED | OUTPATIENT
Start: 2019-04-13 | End: 2019-04-16 | Stop reason: HOSPADM

## 2019-04-12 RX ADMIN — PANTOPRAZOLE SODIUM 40 MG: 40 TABLET, DELAYED RELEASE ORAL at 06:09

## 2019-04-12 RX ADMIN — INSULIN LISPRO 6 UNITS: 100 INJECTION, SOLUTION INTRAVENOUS; SUBCUTANEOUS at 12:50

## 2019-04-12 RX ADMIN — ENOXAPARIN SODIUM 30 MG: 100 INJECTION SUBCUTANEOUS at 08:55

## 2019-04-12 RX ADMIN — ONDANSETRON 4 MG: 2 INJECTION INTRAMUSCULAR; INTRAVENOUS at 08:56

## 2019-04-12 RX ADMIN — ONDANSETRON 4 MG: 2 INJECTION INTRAMUSCULAR; INTRAVENOUS at 16:49

## 2019-04-12 RX ADMIN — CILOSTAZOL 100 MG: 100 TABLET ORAL at 08:55

## 2019-04-12 RX ADMIN — SODIUM CHLORIDE: 9 INJECTION, SOLUTION INTRAVENOUS at 13:28

## 2019-04-12 RX ADMIN — IPRATROPIUM BROMIDE AND ALBUTEROL SULFATE 1 AMPULE: .5; 3 SOLUTION RESPIRATORY (INHALATION) at 07:40

## 2019-04-12 RX ADMIN — ASPIRIN 81 MG 81 MG: 81 TABLET ORAL at 08:55

## 2019-04-12 RX ADMIN — INSULIN LISPRO 6 UNITS: 100 INJECTION, SOLUTION INTRAVENOUS; SUBCUTANEOUS at 20:35

## 2019-04-12 RX ADMIN — GABAPENTIN 100 MG: 100 CAPSULE ORAL at 08:55

## 2019-04-12 RX ADMIN — INSULIN LISPRO 3 UNITS: 100 INJECTION, SOLUTION INTRAVENOUS; SUBCUTANEOUS at 08:55

## 2019-04-12 RX ADMIN — OXYCODONE HYDROCHLORIDE 5 MG: 5 TABLET ORAL at 13:28

## 2019-04-12 RX ADMIN — CILOSTAZOL 100 MG: 100 TABLET ORAL at 20:34

## 2019-04-12 RX ADMIN — INSULIN GLARGINE 10 UNITS: 100 INJECTION, SOLUTION SUBCUTANEOUS at 20:34

## 2019-04-12 RX ADMIN — GUAIFENESIN 600 MG: 600 TABLET, EXTENDED RELEASE ORAL at 08:55

## 2019-04-12 RX ADMIN — IPRATROPIUM BROMIDE AND ALBUTEROL SULFATE 1 AMPULE: .5; 3 SOLUTION RESPIRATORY (INHALATION) at 13:39

## 2019-04-12 RX ADMIN — INSULIN LISPRO 9 UNITS: 100 INJECTION, SOLUTION INTRAVENOUS; SUBCUTANEOUS at 17:59

## 2019-04-12 RX ADMIN — VANCOMYCIN HYDROCHLORIDE 1000 MG: 1 INJECTION, POWDER, LYOPHILIZED, FOR SOLUTION INTRAVENOUS at 11:00

## 2019-04-12 RX ADMIN — SODIUM CHLORIDE: 9 INJECTION, SOLUTION INTRAVENOUS at 06:09

## 2019-04-12 RX ADMIN — ATORVASTATIN CALCIUM 40 MG: 40 TABLET, FILM COATED ORAL at 08:55

## 2019-04-12 RX ADMIN — CEFEPIME 2 G: 2 INJECTION, POWDER, FOR SOLUTION INTRAVENOUS at 13:30

## 2019-04-12 RX ADMIN — Medication 10 ML: at 20:35

## 2019-04-12 RX ADMIN — METHYLPREDNISOLONE SODIUM SUCCINATE 40 MG: 40 INJECTION, POWDER, FOR SOLUTION INTRAMUSCULAR; INTRAVENOUS at 08:55

## 2019-04-12 RX ADMIN — IPRATROPIUM BROMIDE AND ALBUTEROL SULFATE 1 AMPULE: .5; 3 SOLUTION RESPIRATORY (INHALATION) at 19:17

## 2019-04-12 ASSESSMENT — PAIN SCALES - GENERAL: PAINLEVEL_OUTOF10: 9

## 2019-04-12 NOTE — CONSULTS
(PROTONIX) tablet 40 mg, 40 mg, Oral, QAM AC  vitamin D (ERGOCALCIFEROL) capsule 50,000 Units, 50,000 Units, Oral, Weekly  sodium chloride flush 0.9 % injection 10 mL, 10 mL, Intravenous, 2 times per day  sodium chloride flush 0.9 % injection 10 mL, 10 mL, Intravenous, PRN  magnesium hydroxide (MILK OF MAGNESIA) 400 MG/5ML suspension 30 mL, 30 mL, Oral, Daily PRN  ondansetron (ZOFRAN) injection 4 mg, 4 mg, Intravenous, Q6H PRN  enoxaparin (LOVENOX) injection 30 mg, 30 mg, Subcutaneous, Daily  0.9 % sodium chloride infusion, , Intravenous, Continuous  glucose (GLUTOSE) 40 % oral gel 15 g, 15 g, Oral, PRN  dextrose 50 % solution 12.5 g, 12.5 g, Intravenous, PRN  glucagon (rDNA) injection 1 mg, 1 mg, Intramuscular, PRN  dextrose 5 % solution, 100 mL/hr, Intravenous, PRN  ondansetron (ZOFRAN-ODT) disintegrating tablet 4 mg, 4 mg, Oral, Q8H PRN  insulin lispro (HUMALOG) injection vial 0-18 Units, 0-18 Units, Subcutaneous, TID WC  insulin lispro (HUMALOG) injection vial 0-9 Units, 0-9 Units, Subcutaneous, Nightly  cefepime (MAXIPIME) 2 g IVPB minibag, 2 g, Intravenous, Q24H  vancomycin (VANCOCIN) intermittent dosing (placeholder), , Other, RX Placeholder    Allergies:  No known allergies    Social History:    Social History     Tobacco Use    Smoking status: Former Smoker    Smokeless tobacco: Never Used    Tobacco comment: prior to 2010   Substance Use Topics    Alcohol use: No    Drug use: No       Family History:   History reviewed. No pertinent family history.     Review of Systems    CONSTITUTIONAL:  negative  EYES:  negative  HEENT:  negative  RESPIRATORY:  negative  CARDIOVASCULAR:  negative  GASTROINTESTINAL:  negative  GENITOURINARY:  negative  INTEGUMENT/BREAST:  positive for ulcer  MUSCULOSKELETAL:  positive for pain  NEUROLOGICAL:  positive for numbness      Objective:   /66   Pulse 74   Temp 98 °F (36.7 °C) (Oral)   Resp 16   Ht 5' 7\" (1.702 m)   Wt 180 lb (81.6 kg)   SpO2 92%   BMI 28.19 (healthcare-associated pneumonia) J18.9    Acute hypoxemic respiratory failure ([facility]) J96.01    MARCOS (acute kidney injury) ([facility] [address] N17.9    Elevated troponin R74.8    COPD (chronic obstructive pulmonary disease) ([facility]) J44.9    Hypertension I10    Hypercholesteremia E78.00    GERD (gastroesophageal reflux disease) K21.9    Diabetes mellitus ([facility]) E11.9    Pneumonia due to organism J18.9    Leukocytosis D72.829     Onychomycosis  Callus  Foot pain bilateral  diabetes mellitus with peripheral neuropathy   Hammer toes bilateral  diabetic foot ulcers bilateral foot secondary to peripheral neuropathy     Plan  Patient examined. Debridement of nails x10 in length and thickness. Trimmed callus lesions as above. Continue Mepilex borders to ulcer sites. Patient can follow up in the [address] [place] [address],[address] for continued diabetic foot care. Control glucose levels to prevent future complications. Thank you for allowing me to participate in the care of your patient.             Electronically signed by Kaden Berkowitz DPM on 4/12/2019 at 5:03 PM.

## 2019-04-12 NOTE — PROGRESS NOTES
RESPIRATORY THERAPY ASSESSMENT    Name:  Jeffy [facility] Record Number:  [id number]  Age: 80 y.o. Gender: male  : 1934  Today's Date:  2019  Room:  0229/0229-02    Assessment     Is the patient being admitted for a COPD or Asthma exacerbation? No   (If yes the patient will be seen every 4 hours for the first 24 hours and then reassessed)    Patient Admission Diagnosis      Allergies  Allergies   Allergen Reactions    No Known Allergies        Minimum Predicted Vital Capacity:     1006          Actual Vital Capacity:      1040             Pulmonary History:COPD  Home Oxygen Therapy:  room air  Home Respiratory Therapy:Albuterol   Current Respiratory Therapy:  duoneb Q4  Treatment Type: HHN  Medications: Albuterol/Ipratropium    Respiratory Severity Index(RSI)   Patients with orders for inhalation medications, oxygen, or any therapeutic treatment modality will be placed on Respiratory Protocol. They will be assessed with the first treatment and at least every 72 hours thereafter. The following severity scale will be used to determine frequency of treatment intervention.     Smoking History: Pulmonary Disease or Smoking History, Greater than 15 pack year = 2    Social History  Social History     Tobacco Use    Smoking status: Former Smoker    Smokeless tobacco: Never Used    Tobacco comment: prior to    Substance Use Topics    Alcohol use: No    Drug use: No       Recent Surgical History: None = 0  Past Surgical History  Past Surgical History:   Procedure Laterality Date    EYE SURGERY      PACEMAKER INSERTION         Level of Consciousness: Alert, Follows Commands but Disoriented = 1    Level of Activity: Walking with assistance = 1    Respiratory Pattern: Regular Pattern; RR 8-20 = 0    Breath Sounds: Diminshed bilaterally and/or crackles = 2    Sputum   ,  , Sputum How Obtained: Spontaneous cough  Cough: Strong, spontaneous, non-productive = 0    Vital Signs   /65   Pulse 74   Temp 97.3 °F (36.3 °C) (Oral)   Resp 18   Ht 5' 7\" (1.702 m)   Wt 180 lb (81.6 kg)   SpO2 94%   BMI 28.19 kg/m²   SPO2 (COPD values may differ): 90-91% on room air or greater than 92% on FiO2 24- 28% = 1    Peak Flow (asthma only): not applicable = 0    RSI: 7-8 = BID and Q4HPRN (every four hours as needed) for dyspnea        Plan       Goals: medication delivery and improve oxygenation    Patient/caregiver was educated on the proper method of use for Respiratory Care Devices:  Yes      Level of patient/caregiver understanding able to:   ? Verbalize understanding   ? Demonstrate understanding       ? Teach back        ? Needs reinforcement       ? No available caregiver               ? Other:     Response to education:  Very Good     Is patient being placed on Home Treatment Regimen? No     Does the patient have everything they need prior to discharge? NA     Comments: Patient chart reviewed, patient assessed. Plan of Care: change patient to duoneb TID    Electronically signed by Adrianna Cintron RCP on 4/11/2019 at 9:46 PM    Respiratory Protocol Guidelines     1. Assessment and treatment by Respiratory Therapy will be initiated for medication and therapeutic interventions upon initiation of aerosolized medication. 2. Physician will be contacted for respiratory rate (RR) greater than 35 breaths per minute. Therapy will be held for heart rate (HR) greater than 140 beats per minute, pending direction from physician. 3. Bronchodilators will be administered via Metered Dose Inhaler (MDI) with spacer when the following criteria are met:  a. Alert and cooperative     b. HR < 140 bpm  c. RR < 30 bpm                d. Can demonstrate a 2-3 second inspiratory hold  4. Bronchodilators will be administered via Hand Held Nebulizer [facility]) to patients when ANY of the following criteria are met  a. Incognizant or uncooperative          b. Patients treated with HHN at Home        c.  Unable to demonstrate proper use of MDI with spacer     d. RR > 30 bpm   5. Bronchodilators will be delivered via Metered Dose Inhaler (MDI), HHN, Aerogen to intubated patients on mechanical ventilation. 6. Inhalation medication orders will be delivered and/or substituted as outlined below. Aerosolized Medications Ordering and Administration Guidelines:    1. All Medications will be ordered by a physician, and their frequency and/or modality will be adjusted as defined by the patients Respiratory Severity Index (RSI) score. 2. If the patient does not have documented COPD, consider discontinuing anticholinergics when RSI is less than 9.  3. If the bronchospasm worsens (increased RSI), then the bronchodilator frequency can be increased to a maximum of every 4 hours. If greater than every 4 hours is required, the physician will be contacted. 4. If the bronchospasm improves, the frequency of the bronchodilator can be decreased, based on the patient's RSI, but not less than home treatment regimen frequency. 5. Bronchodilator(s) will be discontinued if patient has a RSI less than 9 and has received no scheduled or as needed treatment for 72  Hrs. Patients Ordered on a Mucolytic Agent:    1. Must always be administered with a bronchodilator. 2. Discontinue if patient experiences worsened bronchospasm, or secretions have lessened to the point that the patient is able to clear them with a cough. Anti-inflammatory and Combination Medications:    1. If the patient lacks prior history of lung disease, is not using inhaled anti-inflammatory medication at home, and lacks wheezing by examination or by history for at least 24 hours, contact physician for possible discontinuation.

## 2019-04-12 NOTE — PROGRESS NOTES
Pulmonary & Critical Care Medicine Progress Note  CC:Acute respiratory failure with hypoxemia    Events of Last 24 hours/subjective: Patient feels better. Less cough. EXAM:  /72   Pulse 72   Temp 98.5 °F (36.9 °C) (Oral)   Resp 16   Ht 5' 7\" (1.702 m)   Wt 180 lb (81.6 kg)   SpO2 91%   BMI 28.19 kg/m²  on 2l NC  Tmax: 98.5  CVP:      Intake/Output Summary (Last 24 hours) at 4/12/2019 1151  Last data filed at 4/12/2019 0831  Gross per 24 hour   Intake 1121 ml   Output 895 ml   Net 226 ml     Gen: No distress. Normocephalic, atraumatic. Eyes: PERRL. No sclera icterus. No conjunctival injection. ENT: No discharge. Pharynx clear. Neck: Trachea midline. No obvious mass. Resp: No accessory muscle use. No crackles. few wheezes. No rhonchi. No dullness on percussion. CV: Regular rate. Regular rhythm. No murmur or rub. No edema. GI: Non-tender. Non-distended. No hernia. Skin: Warm and dry. No nodule on exposed extremities. Lymph: No cervical LAD. No supraclavicular LAD. M/S: No cyanosis. No joint deformity. No clubbing. Neuro: Awake. Alert. Moves all four extremities. Psych: Oriented x 3. No anxiety.        Medications:   vancomycin  1,000 mg Intravenous Once    [START ON 4/13/2019] glipiZIDE  5 mg Oral QAM AC    [START ON 4/13/2019] predniSONE  40 mg Oral Daily    insulin glargine  10 Units Subcutaneous Nightly    ipratropium-albuterol  1 ampule Inhalation TID    aspirin  81 mg Oral Daily    atorvastatin  40 mg Oral Daily    cilostazol  100 mg Oral BID    gabapentin  100 mg Oral Daily    guaiFENesin  600 mg Oral Daily    pantoprazole  40 mg Oral QAM AC    vitamin D  50,000 Units Oral Weekly    sodium chloride flush  10 mL Intravenous 2 times per day    enoxaparin  30 mg Subcutaneous Daily    insulin lispro  0-18 Units Subcutaneous TID WC    insulin lispro  0-9 Units Subcutaneous Nightly    cefepime  2 g Intravenous Q24H    vancomycin (VANCOCIN) intermittent dosing (placeholder)   Other RX Placeholder     PRN Meds:  oxyCODONE, nitroGLYCERIN, sodium chloride flush, magnesium hydroxide, ondansetron, glucose, dextrose, glucagon (rDNA), dextrose, ondansetron    Results:  CBC:   Recent Labs     04/10/19  0449 04/11/19  0434 04/12/19  0543   WBC 16.8* 20.7* 18.5*   HGB 12.7* 10.0* 9.5*   HCT 41.3 31.6* 30.8*   MCV 79.1* 78.6* 79.5*   * 316 324     BMP:   Recent Labs     04/10/19  0449 04/11/19  0433 04/12/19  0543    137 140   K 4.4 4.7 4.1   CL 97* 101 104   CO2 27 21 21   BUN 39* 49* 55*   CREATININE 2.0* 2.4* 2.1*     LIVER PROFILE:   Recent Labs     04/10/19  0449   AST 15   ALT 11   BILITOT 0.7   ALKPHOS 97     PT/INR: No results for input(s): PROTIME, INR in the last 72 hours. APTT: No results for input(s): APTT in the last 72 hours. UA:No results for input(s): NITRITE, COLORU, PHUR, LABCAST, WBCUA, RBCUA, MUCUS, TRICHOMONAS, YEAST, BACTERIA, CLARITYU, SPECGRAV, LEUKOCYTESUR, UROBILINOGEN, BILIRUBINUR, BLOODU, GLUCOSEU, AMORPHOUS in the last 72 hours. Invalid input(s): Kin Sale    Cultures:  Bld: ngtd    Films:    CXR: severe multifocal airspace consolidation R>L     ASSESSMENT:  ·  Acute respiratory failure with hypoxemia  · Healthcare associated pneumonia - probable gram negative, risk for methicillin resistant Staph aureus as well   · Elevated troponin  · CAD  · Pacemaker  · DM with hyperglycemia     PLAN:   · Supplemental oxygen to maintain SaO2 >92%; wean as tolerated  · Antibiotics to cover HCAP (D3 vanc and cefepime)  · Pred taper  · Inhaled bronchodilators   · D/w POA- daughter in law and wife with patient.  He wishes to be full code for now but would not want prolonged ventilation (weeks)

## 2019-04-12 NOTE — PROGRESS NOTES
Inpatient Physical Therapy Evaluation and Treatment    Unit: 2w  Date:  4/12/2019  Patient Name:    Adam Benson  Admitting diagnosis:  HCAP (healthcare-associated pneumonia) [J18.9]  Acute hypoxemic respiratory failure ([address] [J96.01]  Admit Date:  4/10/2019  Precautions/Restrictions/WB Status/ Lines/ Wounds/ Oxygen: Fall Risk, IV,  Babcock catheter, telemetry, 2.5L O2 (increased to 3L at end of tx)    Treatment Time:  6721-9664  Treatment Number:  1   Timed Code Treatment Minutes: 36 minutes  Total Treatment Minutes:  46  minutes    Patient Goals for Therapy: \" to feel better \"        Discharge Recommendations: LTC/ ECF with PT services  DME needs for discharge: defer to facility    Home Health S4 Level Recommendation:  NA  AM-PAC Mobility Score    AM-PAC Inpatient Mobility Raw Score : 16       Preadmission Environment  Patient drowsy, difficulty answering questions   Pt. Lives 24/7 Assist Available - LTC at [facility] since November 2018 per patient  Home environment: in [address] [place] [address] facility  Steps to enter first floor: No steps      Bath[facility] and [address] [place] [address] owned: Rollator, W/C, Shower Chair, hospital bed and Other: inhalers, nebulizer?     Preadmission Status / PLOF:  History of falls: Yes-\"maybe one at the Home, but I was falling all the time at home\"--fell OOB \"slipped out of bed\" at [facility] per patient  Pt. Able to drive: No  Pt Fully independent with ADL's: No-reports toileting on own most of the time, shaving on own, dressing on own, but staff assists with shower  Pt. Required assistance from staff for: Bathing, Cooking, Cleaning and Laundry   Pt.  Fully independent for transfers and gait and walked with: Rollator, pt also reports using w/c for mobility   Reports working with therapy at [facility], \"just the last couple days\"     Pain  Yes  Rating:10  Location:R ankle-\"been that way for years\"  Pain Medicine Status: Received pain med prior to tx and RN notified       Cognition    A&O Person , Place  and Situation- cues to orient to , month, year; repeatedly spelling his name \"ION ..A...CHLOE Garrison \" when asked questions  Able to follow 1 step commands inconsistently   Reports being drowsy and on a \"loopy pill for pain\"    Subjective  Patient lying supine in bed with no family present  Pt agreeable to this PT eval & tx. Upper Extremity ROM/Strength  Please see OT evaluation. Lower Extremity ROM / Strength    AROM WFL: Yes    Strength Assessment:  R LE   Quad   4   Ant Tib  3 (resistance not applied 2/2 pain)   Hamstring 5/5   Iliopsoas 4  L LE  Quad   4   Ant Tib  3   Hamstring 5/5   Iliopsoas 4    Lower Extremity Sensation    WNL    Lower Extremity Proprioception:   WNL    Coordination and Tone  WNL    Balance  Static Sitting:  Good    Tolerance: WNL  Dynamic Sitting:  Good -   Static Standing: Fair +   Tolerance: ~2 min  Dynamic Standing: Fair -    Bed Mobility   Supine to Sit:   CGA  Sit to Supine:  Not Tested  Rolling:   [facility]  Scooting at EOB: [facility]  Scooting to [facility]:  Not Tested    Transfer Training     Sit to stand:   Min A  Stand to sit:   Min A  Bed to Chair:  Min A with use of RW, difficulty weight-bearing RLE 2/2 ankle pain    Gait gait deferred due to difficulty with transfers    Activity Tolerance   Pt completed therapy session with Pain  SpO2: 85% with exertion of supine to sit, and with transfer bed to chair   Recovered to 90% each time with PLBing and ~4 min rest   Increased pt's O2 up to 3L, RN notified. HR: 70s-80s  BP:     Positioning Needs   Pt reclined in chair, call light and needs in reach and alarm set    Exercises Initiated    N/A    Other  At end of tx, upon settling in chair, pt became nauseated with small bout of emesis. RN notified. Patient/Family Education   Pt educated on role of inpatient PT, POC, importance of continued activity, calling for assist with mobility. Assessment  Pt seen for Physical Therapy evaluation in acute care setting.   Pt demonstrated decreased Activity tolerance, Balance, Safety and Strength and decreased independence with Ambulation, Bed Mobility  and Transfers. Recommending pt return to LTC facility with PT services when medically appropriate to safely progress IND with functional mobility and to reduce risk for falls. Goals : To be met in 3 visits:  1). Tolerate LE Ex x 10 reps    To be met in 6 visits:  1). Supine to/from sit: Independent  2). Sit to/from stand: Independent  3). Bed to chair: CGA and with use of RW  4). Gait: Ambulate 50 ft  with  CGA  and use of RW  5). Tolerate B LE exercises 20 reps    Rehabilitation Potential    Good  Strengths for achieving goals include:   PLOF and Pt cooperative  Barriers to achieving goals include:    Pain     Plan    To be seen 3-5 x / week  while in acute care setting for therapeutic exercises, bed mobility, transfers, progressive gait training, balance training, and family/patient education. Pari Estes, PT, DPT #[id number]     If patient discharges from this facility prior to next visit, this note will serve as the Discharge Summary.

## 2019-04-12 NOTE — PROGRESS NOTES
Progress Note    Admit Date:  4/10/2019    Subjective:  Mr. Joyce Postal he feels fine. He denies SOB but remains on 2L NC     Objective:   Patient Vitals for the past 4 hrs:   BP Temp Temp src Pulse Resp SpO2   04/12/19 0900 125/65 98.1 °F (36.7 °C) Oral 70 18 91 %       Intake/Output Summary (Last 24 hours) at 4/12/2019 0953  Last data filed at 4/12/2019 0831  Gross per 24 hour   Intake 1121 ml   Output 1070 ml   Net 51 ml       Physical Exam:  Gen: Elderly male. No distress. Alert. Eyes: PERRL. No sclera icterus. No conjunctival injection. ENT: No discharge. Pharynx clear. Neck: No JVD. Trachea midline. Resp: No accessory muscle use. No crackles. No wheezes. No rhonchi. On 2L NC  CV: Regular rate. Regular rhythm. No murmur. No rub. No edema. GI: Non-tender. Non-distended. Normal bowel sounds. Skin: Warm and dry. No nodule on exposed extremities. No rash on exposed extremities. M/S: No cyanosis. No joint deformity. No clubbing. Neuro: Awake. Grossly nonfocal    Psych: Oriented x 3. No anxiety or agitation. I Saw Nails have reviewed the chart on Y[facility] Macias and personally interviewed and examined patient, reviewed the data (labs and imaging) and after discussion with my PA formulated the plan. Agree with note with the following edits. HPI:     I reviewed the patient's Past Medical History, Past Surgical History, Medications, and Allergies. Transferred from ICU  Now on 2 L       General:  eldelry male Awake, alert and oriented. Appears to be not in any distress  Mucous Membranes:  Pink , anicteric  Neck: No JVD, no carotid bruit, no thyromegaly  Chest:  Clear to auscultation bilaterally, diminished in bases   Left side pacer  Cardiovascular:  RRR S1S2 heard, no murmurs or gallops  Abdomen:  Soft, undistended, non tender, no organomegaly, BS present  Extremities: right ankle lateral superficial ulcer  Scattered crusted small wounds on toes   No edema or cyanosis.  Distal multifocal pna  - Pt is a NH resident  - started on vanc and cefepime D#3  - Cultures are NG so far- no fevers, wbc high    COPD exacerbation  - solumedrol for now  - inhaled bronchodilators    - wean steroids      Acute kidney injury   - possibly from prerenal azotemia. Baseline unclear- possible CKD  - Crt was 2.0 on admission. He was placed on IV fluids. His Crt has increased to 2.4  - increase rate of IV NS and cont to monitor BMP   - baseline is not clear  - repeat labs    Sepsis    - 2/2 above  - mgmt with IVF, BP stable  - resolved  -- BP stable    Leukocytosis    - 16.8 on arrival  - 2/2 above  - repeat 20.7  - check labs today     CAD s.p CABG  S.p pacer    Nonspecific elevation of troponin  - No ACS.  Likely with  respiratory issues.       Diabetes mellitus type 2  - resume glipizide  - BG stable on ssi    Remove tidwell    Start PT/OT    DVT Prophylaxis: Lovenox   Diet: DIET CARB CONTROL;  Code Status: Full Code    Dispo: continue care    Johnathan Hood PA-C 10:07 AM 4/12/2019    Agree with above  Changes made to note    Matt Cummins MD 4/12/2019 11:22 AM

## 2019-04-12 NOTE — PROGRESS NOTES
Spoke with Dewayne gleason RN from [facility]. Will fax over recent labs, reports that pt is normally alert and oriented per baseline.

## 2019-04-12 NOTE — FLOWSHEET NOTE
04/11/19 1950   Vital Signs   Temp 97.3 °F (36.3 °C)   Temp Source Oral   Pulse 74   Heart Rate Source Monitor   Resp 18   /65   BP Location Right upper arm   BP Upper/Lower Upper   Patient Position Semi fowlers   Level of Consciousness 0   MEWS Score 1   Oxygen Therapy   SpO2 94 %   O2 Device Nasal cannula   O2 Flow Rate (L/min) 2 L/min   Shift assessment complete; see flow sheet. Scheduled medications administered; see MAR. Call light and bedside table within reach, bed in low, locked position, side rails up, pt encouraged to call for assistance with ambulation or transfer. Pt denies further needs at this time. Nurse and staff will continue to monitor throughout shift.

## 2019-04-12 NOTE — PROGRESS NOTES
Inpatient Occupational Therapy  Evaluation and Treatment    Unit: 2w  Date:  2019  Patient Name:    Jayden Jarvis  Admitting diagnosis:  HCAP (healthcare-associated pneumonia) [J18.9]  Acute hypoxemic respiratory failure ([address] [J96.01]  Admit Date:  4/10/2019  Precautions/Restrictions/WB Status/ Lines/ Wounds/ Oxygen:Fall Risk, IV,  Babcock catheter, telemetry, 2.5L O2 (increased to 3L at end of tx)    Treatment Time:  6899-1534  Treatment Number: 1   Billable Treatment Time: 38 minutes   Total Treatment Time:   48   minutes    Patient Goals for Therapy:  \" feel better \"    Discharge Recommendations: LTC/ ECF with therapy services  DME needs for discharge: defer to facility    Home Health S4 Level Recommendation:  NA  AM-PAC Score: 15    Preadmission Environment  Patient drowsy, questionable historian  Pt. Lives  Assist Available - LTC at [facility] since 2018 per patient  Home environment:  in [address] [place] [address] facility  Steps to enter first floor:   No steps    Bathroom:  Walk in [facility] and [address] [place] [address] owned:  Rollator , W/C, Shower Chair, hospital bed and Other: inhalers, nebulizer? Preadmission Status / PLOF:  History of falls   Yes-\"maybe one at the Home, but I was falling all the time at home\"--fell OOB \"slipped out of bed\" at [facility] per patient  Pt. Able to drive   No  Pt Fully independent with ADL's  No-reports toileting on own most of the time, shaving on own, dressing on own, but staff assists with shower  Pt. Required assistance from staff for:  Bathing, Cooking, Cleaning and Laundry   Pt.  Fully independent for transfers and gait and walked with: Rollator  Reports working with therapy at [facility], \"just the last couple days\"    Pain  Yes  Rating:10  Location:R ankle-\"been that way for years\"  Pain Medicine Status: Received pain med prior to tx and RN notified      Cognition    A&O Person , Place  and Situation- cues to orient to , month, year; repeatedly spelling his name \"J. ..A...M.. David Rubio \" when asked questions  Able to follow 1 step commands inconsistently   Reports being drowsy and on a \"loopy pill for pain\"-RN reports no pain meds given yet today but she can provide due to patient reporting severe R ankle pain    Subjective  Patient lying supine in bed with family present  Pt agreeable to this OT eval & tx. Upper Extremity ROM:    WFL, pt able to perform all bed mobility, transfers, and gait without ROM limitation. Upper Extremity Strength:    WFL, pt able to perform all bed mobility, transfers, and gait without ROM limitation. Upper Extremity Sensation    WNL    Upper Extremity Proprioception:   WNL    Coordination and Tone  WNL    Balance  Static Sitting:  Good   Dynamic Sitting:  Good -   Static Standing: Fair +  Dynamic Standing: Fair -    Bed mobility:    Supine to sit:   CGA  Sit to supine:   Not Tested  Scooting to head of bed:   Not Tested  Scooting in sitting:  CGA  Rolling:   CGA  Bridging:   Not Tested    Transfers:    Sit to stand:  Min A  Stand to sit:  Min A  Bed to Chair:  Min A with RW  Bed to [facility]:  Not Tested    Activity Tolerance   Pt completed therapy session with Pain  SpO2: 85% with exertion of supine to sit, and with transfer bed to chair              Recovered to 90% each time with PLBing and ~4 min rest              Increased pt's O2 up to 3L, RN notified. HR: 70s-80s    Dressing:      UE:  Not Tested  LE:   Mod A  Bathing:    UE: Not Tested  LE: Not Tested  Eating:   Not Tested-Became nauseated after transfer EOB to chair, small emesis into bag, RN notified. Positioning Needs: Remained up in chair, call light and needs in reach. and Alarm Set    Exercise / Activities Initiated:   N/A    Patient/Family Education: Role of OT ; Recommendations for DC     Assessment of Deficits: Pt seen for Occupational therapy evaluation in acute care setting.   Pt demonstrated decreased Activity Tolerance, ADL's, Balance, Bathing, Bed Mobility, Dressing, Safety Awareness, Strength and Transfers. Patient is functioning below baseline and will likely benefit from skilled occupational therapy services to maximize safety and independence. Goal(s) : To be met in 3 Visits:  1). Indep with UE ex x 10 reps    To be met in 5 Visits:  1). Supine to Sit: Independent  2). Bed to Chair/BSC: CGA  3). Upper Body Bathing:  Min A  4). Lower Body Bathing:  Min A  5). Upper Body Dressing: Min A  6). Lower Body Dressing: Min A  7). Pt to poly UE exs [id number]    Rehabilitation Potential:  Good for goals listed above. Strengths for achieving goals include: Pt motivated, PLOF and Pt cooperative  Barriers to achieving goals include:  Complexity of condition and Pain      Plan: To be seen 3-5 x / week  while in acute care setting for therapeutic exercises, bed mobility, transfers, dressing, bathing,family/patient education with adaptive equipment, breathing technique instruction.      Ashanti Redmond, OTR/L 5773            If patient discharges from this facility prior to next visit, this note will serve as the Discharge Summary

## 2019-04-12 NOTE — PROGRESS NOTES
Patient shift assessment completed, see flow sheet. All medications given per MD order, see MAR. Patient in bed, call light is within reach, bed in lowest, locked position. Patient denies further needs at this time.

## 2019-04-12 NOTE — CONSULTS
4-12-19 (0600) vancomycin trough 12.1. Please give vancomycin 1 gram ivpb x1 dose at 1000 on 4-12-19. Please recheck vancomycin trough 4-13-19 at 0600. [facility]. .  4/12/2019 7:16 AM

## 2019-04-12 NOTE — CARE COORDINATION
INTERDISCIPLINARY PLAN OF CARE CONFERENCE    Date/Time: 4/12/2019 1:45 PM  Completed by: Iveth Hallman, Case Management      Patient Name:  Maria Eugenia Cabrera  YOB: 1934  Admitting Diagnosis: HCAP (healthcare-associated pneumonia) [J18.9]  Acute hypoxemic respiratory failure ([facility] [J96.01]     Admit Date/Time:  4/10/2019  4:40 AM    Chart reviewed. Interdisciplinary team met to discuss patient progress and discharge plans. Disciplines included Case Management, Nursing, and Dietitian. Current Status:ongoing    PT/OT recommendation: LTC    Anticipated Discharge Date: tbd  Expected D/C Disposition:  Nursing Home  Confirmed plan with patient and/or family Yes pt and Jordan Rae ([phone])GEOFF  Discharge Plan Comments: Reviewed chart. Role of discharge planner explained and patient and Jordan Rae (daughter) verbalized understanding. Pt is from [facility] and plans to return. Discussed ambulance transportation and Jordan Rae agreed for pt to have ambulance transportation back to [address].          Home O2 in place on admit: No  Pt informed of need to bring portable home O2 tank on day of discharge for nursing to connect prior to leaving:  Not Indicated  Verbalized agreement/Understanding:  Not Indicated

## 2019-04-12 NOTE — CARE COORDINATION
[address] Transitions Interview     2019    Patient: María Del Toro Patient : 1934   MRN: [id number]  Reason for Admission: PNA, MARCOS, R ankle ulcer  RARS: Readmission Risk Score: 23         Spoke with: Mr Kelli Patino (patient)      Readmission Risk  Patient Active Problem List   Diagnosis    HCAP (healthcare-associated pneumonia)    Acute hypoxemic respiratory failure ([address]    MARCOS (acute kidney injury) ([address]    Elevated troponin    COPD (chronic obstructive pulmonary disease) ([address]    Hypertension    Hypercholesteremia    GERD (gastroesophageal reflux disease)    Diabetes mellitus ([address]    Pneumonia due to organism    Leukocytosis       Inpatient Assessment    Patient admitted from [facility] where he resides. Plans to return at discharge as that is his home. States he is a little confused, but answering questions appropriately. Provided him with BPCI-A notification letter and CTC contact. Hospitalist notified that patient is BPCI-A and he will order PT/OT eval when medically appropriate. Follow Up  No future appointments. Health Maintenance  There are no preventive care reminders to display for this patient.     Ashley Pinzon RN

## 2019-04-13 LAB
ANION GAP SERPL CALCULATED.3IONS-SCNC: 11 MMOL/L (ref 3–16)
BASOPHILS ABSOLUTE: 0 K/UL (ref 0–0.2)
BASOPHILS RELATIVE PERCENT: 0.1 %
BUN BLDV-MCNC: 53 MG/DL (ref 7–20)
CALCIUM SERPL-MCNC: 8.5 MG/DL (ref 8.3–10.6)
CHLORIDE BLD-SCNC: 104 MMOL/L (ref 99–110)
CO2: 22 MMOL/L (ref 21–32)
CREAT SERPL-MCNC: 1.9 MG/DL (ref 0.8–1.3)
EOSINOPHILS ABSOLUTE: 0 K/UL (ref 0–0.6)
EOSINOPHILS RELATIVE PERCENT: 0 %
GFR AFRICAN AMERICAN: 41
GFR NON-AFRICAN AMERICAN: 34
GLUCOSE BLD-MCNC: 173 MG/DL (ref 70–99)
GLUCOSE BLD-MCNC: 175 MG/DL (ref 70–99)
GLUCOSE BLD-MCNC: 207 MG/DL (ref 70–99)
GLUCOSE BLD-MCNC: 244 MG/DL (ref 70–99)
GLUCOSE BLD-MCNC: 253 MG/DL (ref 70–99)
HCT VFR BLD CALC: 32.1 % (ref 40.5–52.5)
HEMOGLOBIN: 10.1 G/DL (ref 13.5–17.5)
LYMPHOCYTES ABSOLUTE: 0.7 K/UL (ref 1–5.1)
LYMPHOCYTES RELATIVE PERCENT: 4.3 %
MCH RBC QN AUTO: 24.6 PG (ref 26–34)
MCHC RBC AUTO-ENTMCNC: 31.4 G/DL (ref 31–36)
MCV RBC AUTO: 78.3 FL (ref 80–100)
MONOCYTES ABSOLUTE: 1.2 K/UL (ref 0–1.3)
MONOCYTES RELATIVE PERCENT: 7.7 %
NEUTROPHILS ABSOLUTE: 13.3 K/UL (ref 1.7–7.7)
NEUTROPHILS RELATIVE PERCENT: 87.9 %
PDW BLD-RTO: 18 % (ref 12.4–15.4)
PERFORMED ON: ABNORMAL
PLATELET # BLD: 336 K/UL (ref 135–450)
PMV BLD AUTO: 7.3 FL (ref 5–10.5)
POTASSIUM SERPL-SCNC: 4.1 MMOL/L (ref 3.5–5.1)
RBC # BLD: 4.11 M/UL (ref 4.2–5.9)
SODIUM BLD-SCNC: 137 MMOL/L (ref 136–145)
VANCOMYCIN TROUGH: 16.4 UG/ML (ref 10–20)
WBC # BLD: 15.1 K/UL (ref 4–11)

## 2019-04-13 PROCEDURE — 6360000002 HC RX W HCPCS: Performed by: INTERNAL MEDICINE

## 2019-04-13 PROCEDURE — 36415 COLL VENOUS BLD VENIPUNCTURE: CPT

## 2019-04-13 PROCEDURE — 94640 AIRWAY INHALATION TREATMENT: CPT

## 2019-04-13 PROCEDURE — 80048 BASIC METABOLIC PNL TOTAL CA: CPT

## 2019-04-13 PROCEDURE — 2580000003 HC RX 258: Performed by: INTERNAL MEDICINE

## 2019-04-13 PROCEDURE — 99233 SBSQ HOSP IP/OBS HIGH 50: CPT | Performed by: INTERNAL MEDICINE

## 2019-04-13 PROCEDURE — 6370000000 HC RX 637 (ALT 250 FOR IP): Performed by: INTERNAL MEDICINE

## 2019-04-13 PROCEDURE — 1200000000 HC SEMI PRIVATE

## 2019-04-13 PROCEDURE — 97530 THERAPEUTIC ACTIVITIES: CPT

## 2019-04-13 PROCEDURE — 85025 COMPLETE CBC W/AUTO DIFF WBC: CPT

## 2019-04-13 PROCEDURE — 97110 THERAPEUTIC EXERCISES: CPT

## 2019-04-13 PROCEDURE — 94761 N-INVAS EAR/PLS OXIMETRY MLT: CPT

## 2019-04-13 PROCEDURE — 99232 SBSQ HOSP IP/OBS MODERATE 35: CPT | Performed by: INTERNAL MEDICINE

## 2019-04-13 PROCEDURE — 80202 ASSAY OF VANCOMYCIN: CPT

## 2019-04-13 RX ADMIN — GUAIFENESIN 600 MG: 600 TABLET, EXTENDED RELEASE ORAL at 09:49

## 2019-04-13 RX ADMIN — INSULIN LISPRO 3 UNITS: 100 INJECTION, SOLUTION INTRAVENOUS; SUBCUTANEOUS at 13:00

## 2019-04-13 RX ADMIN — SODIUM CHLORIDE: 9 INJECTION, SOLUTION INTRAVENOUS at 00:24

## 2019-04-13 RX ADMIN — VANCOMYCIN HYDROCHLORIDE 750 MG: 1 INJECTION, POWDER, LYOPHILIZED, FOR SOLUTION INTRAVENOUS at 09:49

## 2019-04-13 RX ADMIN — CILOSTAZOL 100 MG: 100 TABLET ORAL at 09:49

## 2019-04-13 RX ADMIN — ASPIRIN 81 MG 81 MG: 81 TABLET ORAL at 09:49

## 2019-04-13 RX ADMIN — IPRATROPIUM BROMIDE AND ALBUTEROL SULFATE 1 AMPULE: .5; 3 SOLUTION RESPIRATORY (INHALATION) at 13:17

## 2019-04-13 RX ADMIN — GLIPIZIDE 5 MG: 5 TABLET ORAL at 06:07

## 2019-04-13 RX ADMIN — PREDNISONE 40 MG: 20 TABLET ORAL at 09:49

## 2019-04-13 RX ADMIN — INSULIN LISPRO 6 UNITS: 100 INJECTION, SOLUTION INTRAVENOUS; SUBCUTANEOUS at 09:51

## 2019-04-13 RX ADMIN — CEFEPIME 2 G: 2 INJECTION, POWDER, FOR SOLUTION INTRAVENOUS at 12:59

## 2019-04-13 RX ADMIN — GABAPENTIN 100 MG: 100 CAPSULE ORAL at 09:49

## 2019-04-13 RX ADMIN — INSULIN LISPRO 3 UNITS: 100 INJECTION, SOLUTION INTRAVENOUS; SUBCUTANEOUS at 21:48

## 2019-04-13 RX ADMIN — INSULIN GLARGINE 10 UNITS: 100 INJECTION, SOLUTION SUBCUTANEOUS at 21:47

## 2019-04-13 RX ADMIN — OXYCODONE HYDROCHLORIDE 5 MG: 5 TABLET ORAL at 21:42

## 2019-04-13 RX ADMIN — SODIUM CHLORIDE: 9 INJECTION, SOLUTION INTRAVENOUS at 21:44

## 2019-04-13 RX ADMIN — CILOSTAZOL 100 MG: 100 TABLET ORAL at 21:43

## 2019-04-13 RX ADMIN — ENOXAPARIN SODIUM 30 MG: 100 INJECTION SUBCUTANEOUS at 09:49

## 2019-04-13 RX ADMIN — INSULIN LISPRO 3 UNITS: 100 INJECTION, SOLUTION INTRAVENOUS; SUBCUTANEOUS at 17:06

## 2019-04-13 RX ADMIN — SODIUM CHLORIDE: 9 INJECTION, SOLUTION INTRAVENOUS at 06:12

## 2019-04-13 RX ADMIN — ATORVASTATIN CALCIUM 40 MG: 40 TABLET, FILM COATED ORAL at 09:49

## 2019-04-13 RX ADMIN — PANTOPRAZOLE SODIUM 40 MG: 40 TABLET, DELAYED RELEASE ORAL at 06:07

## 2019-04-13 RX ADMIN — ONDANSETRON 4 MG: 2 INJECTION INTRAMUSCULAR; INTRAVENOUS at 10:34

## 2019-04-13 RX ADMIN — IPRATROPIUM BROMIDE AND ALBUTEROL SULFATE 1 AMPULE: .5; 3 SOLUTION RESPIRATORY (INHALATION) at 19:38

## 2019-04-13 RX ADMIN — IPRATROPIUM BROMIDE AND ALBUTEROL SULFATE 1 AMPULE: .5; 3 SOLUTION RESPIRATORY (INHALATION) at 06:44

## 2019-04-13 ASSESSMENT — PAIN SCALES - GENERAL: PAINLEVEL_OUTOF10: 5

## 2019-04-13 NOTE — PROGRESS NOTES
deweight heels. Exercises Initiated    LAQ x20 reps B. Patient/Family Education   Pt educated on role of inpatient PT, POC, importance of continued activity, calling for assist with mobility. Assessment  Pt making some progress in PT, requiring less physical assistance today and able to initiate some gait today. Fatigues easily with expiratory wheezes today with minimal exertion (position changes). Recommending pt return to LTC facility with PT services when medically appropriate to safely progress IND with functional mobility and to reduce risk for falls. Goals : To be met in 3 visits:  1). Tolerate LE Ex x 10 reps    To be met in 6 visits:  1). Supine to/from sit: Independent  2). Sit to/from stand: Independent  3). Bed to chair: CGA and with use of RW  4). Gait: Ambulate 50 ft  with  CGA  and use of RW  5). Tolerate B LE exercises 20 reps    Plan    Continue with plan of care. Jose Raul Yee, PT  #1200    If patient discharges from this facility prior to next visit, this note will serve as the Discharge Summary.

## 2019-04-13 NOTE — PROGRESS NOTES
Patient alert. Woke up emotional this morning. Re oriented patient to place. Side rails up x 2, bed in lowest position, wheels locked, bed alarm on, call light and bed side table in reach.

## 2019-04-13 NOTE — PROGRESS NOTES
Progress Note    Admit Date:  4/10/2019    Subjective:  Mr. Mariana Nelson he is nauseated today  No sob  Off tidwell    Objective:   Patient Vitals for the past 4 hrs:   BP Temp Temp src Pulse Resp SpO2   04/13/19 0729 (!) 147/74 97.1 °F (36.2 °C) Oral 69 16 95 %   04/13/19 0645 -- -- -- -- -- 95 %       Intake/Output Summary (Last 24 hours) at 4/13/2019 7287  Last data filed at 4/12/2019 1746  Gross per 24 hour   Intake 560 ml   Output 700 ml   Net -140 ml       Physical Exam:      General:  eldelry male Awake, alert and oriented. Appears to be not in any distress  Mucous Membranes:  Pink , anicteric  Neck: No JVD, no carotid bruit, no thyromegaly  Chest:  Clear to auscultation bilaterally, diminished in bases   Left side pacer  Cardiovascular:  RRR S1S2 heard, no murmurs or gallops  Abdomen:  Soft, undistended, non tender, no organomegaly, BS present  Extremities: right ankle lateral superficial ulcer  Scattered crusted small wounds on toes   No edema or cyanosis.  Distal pulses well felt  Neurological : grossly normal        Scheduled Meds:   vancomycin  750 mg Intravenous Once    glipiZIDE  5 mg Oral QAM AC    predniSONE  40 mg Oral Daily    insulin glargine  10 Units Subcutaneous Nightly    ipratropium-albuterol  1 ampule Inhalation TID    aspirin  81 mg Oral Daily    atorvastatin  40 mg Oral Daily    cilostazol  100 mg Oral BID    gabapentin  100 mg Oral Daily    guaiFENesin  600 mg Oral Daily    pantoprazole  40 mg Oral QAM AC    vitamin D  50,000 Units Oral Weekly    sodium chloride flush  10 mL Intravenous 2 times per day    enoxaparin  30 mg Subcutaneous Daily    insulin lispro  0-18 Units Subcutaneous TID WC    insulin lispro  0-9 Units Subcutaneous Nightly    cefepime  2 g Intravenous Q24H    vancomycin (VANCOCIN) intermittent dosing (placeholder)   Other RX Placeholder       Continuous Infusions:   sodium chloride 100 mL/hr at 04/13/19 0612    dextrose         PRN Meds:  oxyCODONE, nitroGLYCERIN, sodium chloride flush, magnesium hydroxide, ondansetron, glucose, dextrose, glucagon (rDNA), dextrose, ondansetron      Data:  CBC:   Recent Labs     04/11/19  0434 04/12/19  0543 04/13/19  0522   WBC 20.7* 18.5* 15.1*   HGB 10.0* 9.5* 10.1*   HCT 31.6* 30.8* 32.1*   MCV 78.6* 79.5* 78.3*    324 336     BMP:   Recent Labs     04/11/19  0433 04/12/19  0543 04/13/19  0522    140 137   K 4.7 4.1 4.1    104 104   CO2 21 21 22   BUN 49* 55* 53*   CREATININE 2.4* 2.1* 1.9*     LIVER PROFILE:   No results for input(s): AST, ALT, LIPASE, BILIDIR, BILITOT, ALKPHOS in the last 72 hours. Invalid input(s): AMYLASE,  ALB  CULTURES    Blood: NGTD  Rapid flu: negative      RADIOLOGY  XR CHEST PORTABLE   Final Result   Multifocal bilateral pneumonia with bilateral pleural effusions. Assessment/Plan:    Acute respiratory failure with hypoxia  - Patient was placed on BiPAP and admitted to ICU   - He improved   - pt O2 sats dropped to upper 80s, now on 2L NC with sats in low 90s      Healthcare associated pneumonia   - due to gram-negative organism or MRSA  - imaging with multifocal pna  - Pt is a [facility] resident  - started on vanc and cefepime D#3  - Cultures are NG so far- no fevers    COPD exacerbation  - solumedrol for now  - inhaled bronchodilators    - wean steroids      Acute kidney injury   - possibly from prerenal azotemia. Baseline unclear- possible CKD  - Crt was 2.0 on admission. He was placed on IV fluids. His Crt has increased to 2.4  - increase rate of IV NS and cont to monitor BMP   - baseline is not clear  - obtain labs from [place]    Sepsis    - 2/2 above  - mgmt with IVF, BP stable  - resolved  -- BP stable    Leukocytosis    - 16.8 on arrival  - 2/2 above  - repeat 20.7- 15, improving        CAD s.p CABG  S.p pacer    Nonspecific elevation of troponin  - No ACS.  Likely with  respiratory issues.       Diabetes mellitus type 2  - resume glipizide  - BG stable on ssi    Remove tidwell    Start PT/OT    DVT Prophylaxis: Lovenox   Diet: DIET CARB CONTROL;  Code Status: Full Code    Dispo: continue care      Peterson Burton MD 4/13/2019 8:23 AM

## 2019-04-13 NOTE — PROGRESS NOTES
Pulmonary & Critical Care Medicine Progress Note  CC:Acute respiratory failure with hypoxemia    Events of Last 24 hours/subjective: Patient feels more fatigued today. He did not sleep much. EXAM:  BP (!) 147/74   Pulse 69   Temp 97.1 °F (36.2 °C) (Oral)   Resp 16   Ht 5' 7\" (1.702 m)   Wt 180 lb (81.6 kg)   SpO2 95%   BMI 28.19 kg/m²  on 3l NC  Tmax: 98.5  CVP:      Intake/Output Summary (Last 24 hours) at 4/13/2019 1144  Last data filed at 4/13/2019 0951  Gross per 24 hour   Intake 240 ml   Output 1400 ml   Net -1160 ml     Gen: No distress. Normocephalic, atraumatic. Fatigued. Eyes: PERRL. No sclera icterus. No conjunctival injection. ENT: No discharge. Pharynx clear. Neck: Trachea midline. No obvious mass. Resp: No accessory muscle use. No crackles. few wheezes. No rhonchi. No dullness on percussion. CV: Regular rate. Regular rhythm. No murmur or rub. No edema. GI: Non-tender. Non-distended. No hernia. Skin: Warm and dry. No nodule on exposed extremities. Lymph: No cervical LAD. No supraclavicular LAD. M/S: No cyanosis. No joint deformity. No clubbing. Neuro: Awake but sleepy. Alert. Moves all four extremities.          Medications:   glipiZIDE  5 mg Oral QAM AC    predniSONE  40 mg Oral Daily    insulin glargine  10 Units Subcutaneous Nightly    ipratropium-albuterol  1 ampule Inhalation TID    aspirin  81 mg Oral Daily    atorvastatin  40 mg Oral Daily    cilostazol  100 mg Oral BID    gabapentin  100 mg Oral Daily    guaiFENesin  600 mg Oral Daily    pantoprazole  40 mg Oral QAM AC    vitamin D  50,000 Units Oral Weekly    sodium chloride flush  10 mL Intravenous 2 times per day    enoxaparin  30 mg Subcutaneous Daily    insulin lispro  0-18 Units Subcutaneous TID WC    insulin lispro  0-9 Units Subcutaneous Nightly    cefepime  2 g Intravenous Q24H    vancomycin (VANCOCIN) intermittent dosing (placeholder)   Other RX Placeholder     PRN Meds:  oxyCODONE, nitroGLYCERIN, sodium chloride flush, magnesium hydroxide, ondansetron, glucose, dextrose, glucagon (rDNA), dextrose, ondansetron    Results:  CBC:   Recent Labs     04/11/19  0434 04/12/19  0543 04/13/19  0522   WBC 20.7* 18.5* 15.1*   HGB 10.0* 9.5* 10.1*   HCT 31.6* 30.8* 32.1*   MCV 78.6* 79.5* 78.3*    324 336     BMP:   Recent Labs     04/11/19  0433 04/12/19  0543 04/13/19  0522    140 137   K 4.7 4.1 4.1    104 104   CO2 21 21 22   BUN 49* 55* 53*   CREATININE 2.4* 2.1* 1.9*     LIVER PROFILE:   No results for input(s): AST, ALT, LIPASE, BILIDIR, BILITOT, ALKPHOS in the last 72 hours. Invalid input(s): AMYLASE,  ALB  PT/INR: No results for input(s): PROTIME, INR in the last 72 hours. APTT: No results for input(s): APTT in the last 72 hours. UA:No results for input(s): NITRITE, COLORU, PHUR, LABCAST, WBCUA, RBCUA, MUCUS, TRICHOMONAS, YEAST, BACTERIA, CLARITYU, SPECGRAV, LEUKOCYTESUR, UROBILINOGEN, BILIRUBINUR, BLOODU, GLUCOSEU, AMORPHOUS in the last 72 hours. Invalid input(s): Martha Webster    Cultures:  Bld: ngtd    Films:    CXR: severe multifocal airspace consolidation R>L     ASSESSMENT:  ·  Acute respiratory failure with hypoxemia  · Healthcare associated pneumonia - probable gram negative, risk for methicillin resistant Staph aureus as well   · CAD  · Pacemaker  · DM with hyperglycemia     PLAN:   · Supplemental oxygen to maintain SaO2 >92%; wean as tolerated  · Antibiotics to cover HCAP (D4 vanc and cefepime)  · Pred taper  · Inhaled bronchodilators   · D/w POA- daughter in law and wife with patient.  He wishes to be full code for now but would not want prolonged ventilation (weeks)

## 2019-04-13 NOTE — CONSULTS
4-13-19 (0522) vancomycin trough 16.4. Please give patient vancomycin 750mg ivpb x1 dose at 1000 on 4-13-19. Please perform vancomycin trough 4-14-19 at 0600 and pulse dose vancomycin per pharmacy protocol. [address] [place] [address]. Ph.  4/13/2019 7:10 AM

## 2019-04-13 NOTE — PLAN OF CARE
Problem: Falls - Risk of:  Goal: Will remain free from falls  Description  Will remain free from falls  Outcome: Ongoing     Problem: Risk for Impaired Skin Integrity  Goal: Tissue integrity - skin and mucous membranes  Description  Structural intactness and normal physiological function of skin and  mucous membranes.   Outcome: Ongoing     Problem: Airway Clearance - Ineffective:  Goal: Clear lung sounds  Description  Clear lung sounds  Outcome: Ongoing

## 2019-04-13 NOTE — PROGRESS NOTES
Patient resting, eyes closed, respirations witnessed as e/e. No signs of distress. Call light and bedside table in easy reach.

## 2019-04-14 LAB
ANION GAP SERPL CALCULATED.3IONS-SCNC: 13 MMOL/L (ref 3–16)
ANISOCYTOSIS: ABNORMAL
BASOPHILS ABSOLUTE: 0 K/UL (ref 0–0.2)
BASOPHILS RELATIVE PERCENT: 0 %
BUN BLDV-MCNC: 48 MG/DL (ref 7–20)
BURR CELLS: ABNORMAL
CALCIUM SERPL-MCNC: 8.4 MG/DL (ref 8.3–10.6)
CHLORIDE BLD-SCNC: 110 MMOL/L (ref 99–110)
CO2: 22 MMOL/L (ref 21–32)
CREAT SERPL-MCNC: 1.6 MG/DL (ref 0.8–1.3)
EOSINOPHILS ABSOLUTE: 0 K/UL (ref 0–0.6)
EOSINOPHILS RELATIVE PERCENT: 0 %
GFR AFRICAN AMERICAN: 50
GFR NON-AFRICAN AMERICAN: 41
GLUCOSE BLD-MCNC: 107 MG/DL (ref 70–99)
GLUCOSE BLD-MCNC: 114 MG/DL (ref 70–99)
GLUCOSE BLD-MCNC: 119 MG/DL (ref 70–99)
GLUCOSE BLD-MCNC: 127 MG/DL (ref 70–99)
GLUCOSE BLD-MCNC: 175 MG/DL (ref 70–99)
HCT VFR BLD CALC: 29.4 % (ref 40.5–52.5)
HEMOGLOBIN: 9.4 G/DL (ref 13.5–17.5)
LYMPHOCYTES ABSOLUTE: 1.2 K/UL (ref 1–5.1)
LYMPHOCYTES RELATIVE PERCENT: 10 %
MCH RBC QN AUTO: 24.5 PG (ref 26–34)
MCHC RBC AUTO-ENTMCNC: 31.9 G/DL (ref 31–36)
MCV RBC AUTO: 76.8 FL (ref 80–100)
MONOCYTES ABSOLUTE: 0.8 K/UL (ref 0–1.3)
MONOCYTES RELATIVE PERCENT: 7 %
NEUTROPHILS ABSOLUTE: 9.6 K/UL (ref 1.7–7.7)
NEUTROPHILS RELATIVE PERCENT: 83 %
OVALOCYTES: ABNORMAL
PDW BLD-RTO: 17.7 % (ref 12.4–15.4)
PERFORMED ON: ABNORMAL
PLATELET # BLD: 334 K/UL (ref 135–450)
PLATELET SLIDE REVIEW: ADEQUATE
PMV BLD AUTO: 7.3 FL (ref 5–10.5)
POIKILOCYTES: ABNORMAL
POTASSIUM SERPL-SCNC: 4.2 MMOL/L (ref 3.5–5.1)
RBC # BLD: 3.83 M/UL (ref 4.2–5.9)
SLIDE REVIEW: ABNORMAL
SODIUM BLD-SCNC: 145 MMOL/L (ref 136–145)
TEAR DROP CELLS: ABNORMAL
VANCOMYCIN TROUGH: 16.2 UG/ML (ref 10–20)
WBC # BLD: 11.6 K/UL (ref 4–11)

## 2019-04-14 PROCEDURE — 94640 AIRWAY INHALATION TREATMENT: CPT

## 2019-04-14 PROCEDURE — 80048 BASIC METABOLIC PNL TOTAL CA: CPT

## 2019-04-14 PROCEDURE — 6370000000 HC RX 637 (ALT 250 FOR IP): Performed by: INTERNAL MEDICINE

## 2019-04-14 PROCEDURE — 85025 COMPLETE CBC W/AUTO DIFF WBC: CPT

## 2019-04-14 PROCEDURE — 99232 SBSQ HOSP IP/OBS MODERATE 35: CPT | Performed by: INTERNAL MEDICINE

## 2019-04-14 PROCEDURE — 6360000002 HC RX W HCPCS: Performed by: INTERNAL MEDICINE

## 2019-04-14 PROCEDURE — 1200000000 HC SEMI PRIVATE

## 2019-04-14 PROCEDURE — 2700000000 HC OXYGEN THERAPY PER DAY

## 2019-04-14 PROCEDURE — 36415 COLL VENOUS BLD VENIPUNCTURE: CPT

## 2019-04-14 PROCEDURE — 80202 ASSAY OF VANCOMYCIN: CPT

## 2019-04-14 PROCEDURE — 2580000003 HC RX 258: Performed by: INTERNAL MEDICINE

## 2019-04-14 PROCEDURE — 94761 N-INVAS EAR/PLS OXIMETRY MLT: CPT

## 2019-04-14 RX ORDER — PREDNISONE 20 MG/1
20 TABLET ORAL DAILY
Status: DISCONTINUED | OUTPATIENT
Start: 2019-04-14 | End: 2019-04-16 | Stop reason: HOSPADM

## 2019-04-14 RX ADMIN — CILOSTAZOL 100 MG: 100 TABLET ORAL at 20:16

## 2019-04-14 RX ADMIN — ASPIRIN 81 MG 81 MG: 81 TABLET ORAL at 08:18

## 2019-04-14 RX ADMIN — ENOXAPARIN SODIUM 30 MG: 100 INJECTION SUBCUTANEOUS at 08:18

## 2019-04-14 RX ADMIN — ONDANSETRON 4 MG: 2 INJECTION INTRAMUSCULAR; INTRAVENOUS at 20:17

## 2019-04-14 RX ADMIN — Medication 10 ML: at 08:18

## 2019-04-14 RX ADMIN — IPRATROPIUM BROMIDE AND ALBUTEROL SULFATE 1 AMPULE: .5; 3 SOLUTION RESPIRATORY (INHALATION) at 19:41

## 2019-04-14 RX ADMIN — INSULIN LISPRO 3 UNITS: 100 INJECTION, SOLUTION INTRAVENOUS; SUBCUTANEOUS at 17:56

## 2019-04-14 RX ADMIN — Medication 10 ML: at 20:17

## 2019-04-14 RX ADMIN — GUAIFENESIN 600 MG: 600 TABLET, EXTENDED RELEASE ORAL at 08:18

## 2019-04-14 RX ADMIN — ATORVASTATIN CALCIUM 40 MG: 40 TABLET, FILM COATED ORAL at 08:18

## 2019-04-14 RX ADMIN — GABAPENTIN 100 MG: 100 CAPSULE ORAL at 08:18

## 2019-04-14 RX ADMIN — CEFEPIME 2 G: 2 INJECTION, POWDER, FOR SOLUTION INTRAVENOUS at 15:13

## 2019-04-14 RX ADMIN — VANCOMYCIN HYDROCHLORIDE 750 MG: 1 INJECTION, POWDER, LYOPHILIZED, FOR SOLUTION INTRAVENOUS at 10:46

## 2019-04-14 RX ADMIN — IPRATROPIUM BROMIDE AND ALBUTEROL SULFATE 1 AMPULE: .5; 3 SOLUTION RESPIRATORY (INHALATION) at 07:28

## 2019-04-14 RX ADMIN — OXYCODONE HYDROCHLORIDE 5 MG: 5 TABLET ORAL at 20:16

## 2019-04-14 RX ADMIN — PREDNISONE 20 MG: 20 TABLET ORAL at 08:18

## 2019-04-14 RX ADMIN — CILOSTAZOL 100 MG: 100 TABLET ORAL at 08:18

## 2019-04-14 RX ADMIN — IPRATROPIUM BROMIDE AND ALBUTEROL SULFATE 1 AMPULE: .5; 3 SOLUTION RESPIRATORY (INHALATION) at 13:01

## 2019-04-14 ASSESSMENT — PAIN SCALES - GENERAL: PAINLEVEL_OUTOF10: 5

## 2019-04-14 NOTE — CONSULTS
4-14-19 (0650) vancomycin trough 16.2. Please give patient vancomycin 750mg ivpb x1 dose at 1000 on 4-14-19. Please perform vancomycin trough 4-15-19 at 0600 and pulse dose vancomycin per pharmacy protocol. [address] [place] [address]. Ph.  4/14/2019 7:53 AM

## 2019-04-14 NOTE — PROGRESS NOTES
Pulmonary & Critical Care Medicine Progress Note  CC:Acute respiratory failure with hypoxemia    Events of Last 24 hours/subjective: Patient feels better. Less coughing. EXAM:  BP (!) 155/78   Pulse 70   Temp 97.1 °F (36.2 °C) (Oral)   Resp 18   Ht 5' 7\" (1.702 m)   Wt 180 lb (81.6 kg)   SpO2 90%   BMI 28.19 kg/m²  on 3l NC  Tmax: 98.1  CVP:      Intake/Output Summary (Last 24 hours) at 4/14/2019 0629  Last data filed at 4/14/2019 0536  Gross per 24 hour   Intake 2131 ml   Output 1775 ml   Net 356 ml     Gen: No distress. Normocephalic, atraumatic. Comfortable. Eyes: PERRL. No sclera icterus. No conjunctival injection. ENT: No discharge. Pharynx clear. Neck: Trachea midline. No obvious mass. Resp: No accessory muscle use. No crackles. No wheezes. No rhonchi. No dullness on percussion. CV: Regular rate. Regular rhythm. No murmur or rub. No edema. GI: Non-tender. Non-distended. No hernia. Skin: Warm and dry. No nodule on exposed extremities. Lymph: No cervical LAD. No supraclavicular LAD. M/S: No cyanosis. No joint deformity. No clubbing. Neuro: Awake. Alert. Moves all four extremities.          Medications:   glipiZIDE  5 mg Oral QAM AC    predniSONE  40 mg Oral Daily    insulin glargine  10 Units Subcutaneous Nightly    ipratropium-albuterol  1 ampule Inhalation TID    aspirin  81 mg Oral Daily    atorvastatin  40 mg Oral Daily    cilostazol  100 mg Oral BID    gabapentin  100 mg Oral Daily    guaiFENesin  600 mg Oral Daily    pantoprazole  40 mg Oral QAM AC    vitamin D  50,000 Units Oral Weekly    sodium chloride flush  10 mL Intravenous 2 times per day    enoxaparin  30 mg Subcutaneous Daily    insulin lispro  0-18 Units Subcutaneous TID WC    insulin lispro  0-9 Units Subcutaneous Nightly    cefepime  2 g Intravenous Q24H    vancomycin (VANCOCIN) intermittent dosing (placeholder)   Other RX Placeholder     PRN Meds:  oxyCODONE, nitroGLYCERIN, sodium chloride flush, magnesium hydroxide, ondansetron, glucose, dextrose, glucagon (rDNA), dextrose, ondansetron    Results:  CBC:   Recent Labs     04/12/19  0543 04/13/19  0522   WBC 18.5* 15.1*   HGB 9.5* 10.1*   HCT 30.8* 32.1*   MCV 79.5* 78.3*    336     BMP:   Recent Labs     04/12/19  0543 04/13/19  0522    137   K 4.1 4.1    104   CO2 21 22   BUN 55* 53*   CREATININE 2.1* 1.9*     LIVER PROFILE:   No results for input(s): AST, ALT, LIPASE, BILIDIR, BILITOT, ALKPHOS in the last 72 hours. Invalid input(s): AMYLASE,  ALB  PT/INR: No results for input(s): PROTIME, INR in the last 72 hours. APTT: No results for input(s): APTT in the last 72 hours. UA:No results for input(s): NITRITE, COLORU, PHUR, LABCAST, WBCUA, RBCUA, MUCUS, TRICHOMONAS, YEAST, BACTERIA, CLARITYU, SPECGRAV, LEUKOCYTESUR, UROBILINOGEN, BILIRUBINUR, BLOODU, GLUCOSEU, AMORPHOUS in the last 72 hours. Invalid input(s): KETONESU    Cultures:  Bld: ngtd    Films:    CXR 4/10: severe multifocal airspace consolidation R>L     ASSESSMENT:  ·  Acute respiratory failure with hypoxemia  · Healthcare associated pneumonia - probable gram negative, risk for methicillin resistant Staph aureus as well   · CAD  · Pacemaker  · DM with hyperglycemia     PLAN:   · Supplemental oxygen to maintain SaO2 >92%; wean as tolerated  · Antibiotics to cover HCAP (D5 vanc and cefepime)  · Pred taper  · Inhaled bronchodilators   · On admission D/w POA- daughter in law and wife with patient.  He wishes to be full code for now but would not want prolonged ventilation (weeks)

## 2019-04-14 NOTE — PROGRESS NOTES
Progress Note    Admit Date:  4/10/2019    Subjective:  Mr. Faisal Angeles he is feeling better. Tolerating diet well   No new issues on 2 L oxygen      Objective:   Patient Vitals for the past 4 hrs:   BP Temp Temp src Pulse Resp SpO2   04/14/19 0730 -- -- -- -- 18 95 %   04/14/19 0530 (!) 155/78 97.1 °F (36.2 °C) Oral 70 18 90 %       Intake/Output Summary (Last 24 hours) at 4/14/2019 0813  Last data filed at 4/14/2019 0536  Gross per 24 hour   Intake 2131 ml   Output 1775 ml   Net 356 ml       Physical Exam:      General:  eldelry male Awake, alert and oriented. Appears to be not in any distress  Mucous Membranes:  Pink , anicteric  Neck: No JVD, no carotid bruit, no thyromegaly  Chest:  Clear to auscultation bilaterally, diminished in bases   Left side pacer  Cardiovascular:  RRR S1S2 heard, no murmurs or gallops  Abdomen:  Soft, undistended, non tender, no organomegaly, BS present  Extremities: right ankle lateral superficial ulcer  Scattered crusted small wounds on toes   No edema or cyanosis.  Distal pulses well felt  Neurological : grossly normal        Scheduled Meds:   predniSONE  20 mg Oral Daily    vancomycin  750 mg Intravenous Once    glipiZIDE  5 mg Oral QAM AC    insulin glargine  10 Units Subcutaneous Nightly    ipratropium-albuterol  1 ampule Inhalation TID    aspirin  81 mg Oral Daily    atorvastatin  40 mg Oral Daily    cilostazol  100 mg Oral BID    gabapentin  100 mg Oral Daily    guaiFENesin  600 mg Oral Daily    pantoprazole  40 mg Oral QAM AC    vitamin D  50,000 Units Oral Weekly    sodium chloride flush  10 mL Intravenous 2 times per day    enoxaparin  30 mg Subcutaneous Daily    insulin lispro  0-18 Units Subcutaneous TID WC    insulin lispro  0-9 Units Subcutaneous Nightly    cefepime  2 g Intravenous Q24H    vancomycin (VANCOCIN) intermittent dosing (placeholder)   Other RX Placeholder       Continuous Infusions:   sodium chloride 100 mL/hr at 04/13/19 9468    dextrose         PRN Meds:  oxyCODONE, nitroGLYCERIN, sodium chloride flush, magnesium hydroxide, ondansetron, glucose, dextrose, glucagon (rDNA), dextrose, ondansetron      Data:  CBC:   Recent Labs     04/12/19  0543 04/13/19  0522 04/14/19  0650   WBC 18.5* 15.1* 11.6*   HGB 9.5* 10.1* 9.4*   HCT 30.8* 32.1* 29.4*   MCV 79.5* 78.3* 76.8*    336 334     BMP:   Recent Labs     04/12/19  0543 04/13/19  0522 04/14/19  0650    137 145   K 4.1 4.1 4.2    104 110   CO2 21 22 22   BUN 55* 53* 48*   CREATININE 2.1* 1.9* 1.6*     LIVER PROFILE:   No results for input(s): AST, ALT, LIPASE, BILIDIR, BILITOT, ALKPHOS in the last 72 hours. Invalid input(s): AMYLASE,  ALB  CULTURES    Blood: NGTD  Rapid flu: negative      RADIOLOGY  XR CHEST PORTABLE   Final Result   Multifocal bilateral pneumonia with bilateral pleural effusions. Assessment/Plan:    Acute respiratory failure with hypoxia  - Patient was placed on BiPAP and admitted to ICU   - He improved   - pt O2 sats dropped to upper 80s, now on 2L NC with sats in low 90s      Healthcare associated pneumonia   - due to gram-negative organism or MRSA  - imaging with multifocal pna  - Pt is a NH resident  - started on vanc and cefepime D#3  - Cultures are NG so far- no fevers    COPD exacerbation  - solumedrol for now  - inhaled bronchodilators    - wean steroids      Acute kidney injury   - possibly from prerenal azotemia. Baseline unclear- possible CKD  - Crt was 2.0 on admission. He was placed on IV fluids. His Crt has increased to 2.4  - increase rate of IV NS and cont to monitor BMP   - baseline is not clear  - obtain labs from [place]    Sepsis    - 2/2 above  - mgmt with IVF, BP stable  - resolved  -- BP stable    Leukocytosis    - 16.8 on arrival  - 2/2 above  - repeat 20.7- 15, improving        CAD s.p CABG  S.p pacer    Nonspecific elevation of troponin  - No ACS.  Likely with  respiratory issues.       Diabetes mellitus type 2  -

## 2019-04-14 NOTE — PROGRESS NOTES
Attempted to collect orthostatic blood pressure and pule, patient increasingly agitated at this time repeatedly telling writer to get out of his room now and don't come back

## 2019-04-14 NOTE — PLAN OF CARE
Problem: Falls - Risk of:  Goal: Will remain free from falls  Description  Will remain free from falls  4/14/2019 1132 by Brenna Chung RN  Outcome: Ongoing  4/13/2019 2230 by Rudy Pedro RN  Outcome: Ongoing     Problem: Falls - Risk of:  Goal: Absence of physical injury  Description  Absence of physical injury  Outcome: Ongoing     Problem: Risk for Impaired Skin Integrity  Goal: Tissue integrity - skin and mucous membranes  Description  Structural intactness and normal physiological function of skin and  mucous membranes.   4/14/2019 1132 by Brenna Chung RN  Outcome: Ongoing  4/13/2019 2230 by Rudy Pedro RN  Outcome: Ongoing

## 2019-04-14 NOTE — PROGRESS NOTES
Shift assessment complete. See flow sheet. Evening medications administered. See MAR. Vital signs stable. Patient is alert and oriented with periods of confusion. Pt denies any present needs/concerns. Call light explained and in reach. Tele sitter in place for safety, bed check on. Will continue to monitor.

## 2019-04-14 NOTE — PROGRESS NOTES
Patient continues to refuse to communicate with staff. Currently laying in bed, call light is within reach. Bed in lowest locked position.

## 2019-04-15 LAB
ANION GAP SERPL CALCULATED.3IONS-SCNC: 15 MMOL/L (ref 3–16)
ANISOCYTOSIS: ABNORMAL
BASOPHILS ABSOLUTE: 0 K/UL (ref 0–0.2)
BASOPHILS RELATIVE PERCENT: 0 %
BLOOD CULTURE, ROUTINE: NORMAL
BUN BLDV-MCNC: 41 MG/DL (ref 7–20)
CALCIUM SERPL-MCNC: 8.8 MG/DL (ref 8.3–10.6)
CHLORIDE BLD-SCNC: 105 MMOL/L (ref 99–110)
CO2: 21 MMOL/L (ref 21–32)
CREAT SERPL-MCNC: 1.6 MG/DL (ref 0.8–1.3)
CULTURE, BLOOD 2: NORMAL
EOSINOPHILS ABSOLUTE: 0 K/UL (ref 0–0.6)
EOSINOPHILS RELATIVE PERCENT: 0 %
GFR AFRICAN AMERICAN: 50
GFR NON-AFRICAN AMERICAN: 41
GLUCOSE BLD-MCNC: 119 MG/DL (ref 70–99)
GLUCOSE BLD-MCNC: 143 MG/DL (ref 70–99)
GLUCOSE BLD-MCNC: 184 MG/DL (ref 70–99)
GLUCOSE BLD-MCNC: 193 MG/DL (ref 70–99)
GLUCOSE BLD-MCNC: 307 MG/DL (ref 70–99)
GLUCOSE BLD-MCNC: 84 MG/DL (ref 70–99)
HCT VFR BLD CALC: 33.1 % (ref 40.5–52.5)
HEMOGLOBIN: 10.5 G/DL (ref 13.5–17.5)
LYMPHOCYTES ABSOLUTE: 0.8 K/UL (ref 1–5.1)
LYMPHOCYTES RELATIVE PERCENT: 6 %
MCH RBC QN AUTO: 24.9 PG (ref 26–34)
MCHC RBC AUTO-ENTMCNC: 31.9 G/DL (ref 31–36)
MCV RBC AUTO: 78.2 FL (ref 80–100)
MONOCYTES ABSOLUTE: 0.7 K/UL (ref 0–1.3)
MONOCYTES RELATIVE PERCENT: 5 %
NEUTROPHILS ABSOLUTE: 11.6 K/UL (ref 1.7–7.7)
NEUTROPHILS RELATIVE PERCENT: 89 %
PDW BLD-RTO: 18.1 % (ref 12.4–15.4)
PERFORMED ON: ABNORMAL
PERFORMED ON: NORMAL
PLATELET # BLD: 375 K/UL (ref 135–450)
PLATELET SLIDE REVIEW: ADEQUATE
PMV BLD AUTO: 6.9 FL (ref 5–10.5)
POTASSIUM SERPL-SCNC: 3.9 MMOL/L (ref 3.5–5.1)
RBC # BLD: 4.23 M/UL (ref 4.2–5.9)
SLIDE REVIEW: ABNORMAL
SODIUM BLD-SCNC: 141 MMOL/L (ref 136–145)
VANCOMYCIN TROUGH: 16.9 UG/ML (ref 10–20)
WBC # BLD: 13 K/UL (ref 4–11)

## 2019-04-15 PROCEDURE — 2700000000 HC OXYGEN THERAPY PER DAY

## 2019-04-15 PROCEDURE — 1200000000 HC SEMI PRIVATE

## 2019-04-15 PROCEDURE — 6370000000 HC RX 637 (ALT 250 FOR IP): Performed by: INTERNAL MEDICINE

## 2019-04-15 PROCEDURE — 97530 THERAPEUTIC ACTIVITIES: CPT

## 2019-04-15 PROCEDURE — 80048 BASIC METABOLIC PNL TOTAL CA: CPT

## 2019-04-15 PROCEDURE — 97110 THERAPEUTIC EXERCISES: CPT

## 2019-04-15 PROCEDURE — 36415 COLL VENOUS BLD VENIPUNCTURE: CPT

## 2019-04-15 PROCEDURE — 99232 SBSQ HOSP IP/OBS MODERATE 35: CPT | Performed by: INTERNAL MEDICINE

## 2019-04-15 PROCEDURE — 2580000003 HC RX 258: Performed by: INTERNAL MEDICINE

## 2019-04-15 PROCEDURE — 94640 AIRWAY INHALATION TREATMENT: CPT

## 2019-04-15 PROCEDURE — 6360000002 HC RX W HCPCS: Performed by: HOSPITALIST

## 2019-04-15 PROCEDURE — 6370000000 HC RX 637 (ALT 250 FOR IP): Performed by: HOSPITALIST

## 2019-04-15 PROCEDURE — 6360000002 HC RX W HCPCS: Performed by: INTERNAL MEDICINE

## 2019-04-15 PROCEDURE — 80202 ASSAY OF VANCOMYCIN: CPT

## 2019-04-15 PROCEDURE — 85025 COMPLETE CBC W/AUTO DIFF WBC: CPT

## 2019-04-15 PROCEDURE — 94761 N-INVAS EAR/PLS OXIMETRY MLT: CPT

## 2019-04-15 RX ADMIN — GABAPENTIN 100 MG: 100 CAPSULE ORAL at 08:33

## 2019-04-15 RX ADMIN — INSULIN LISPRO 2 UNITS: 100 INJECTION, SOLUTION INTRAVENOUS; SUBCUTANEOUS at 20:56

## 2019-04-15 RX ADMIN — Medication 10 ML: at 08:35

## 2019-04-15 RX ADMIN — VANCOMYCIN HYDROCHLORIDE 750 MG: 1 INJECTION, POWDER, LYOPHILIZED, FOR SOLUTION INTRAVENOUS at 09:51

## 2019-04-15 RX ADMIN — GUAIFENESIN 600 MG: 600 TABLET, EXTENDED RELEASE ORAL at 08:33

## 2019-04-15 RX ADMIN — IPRATROPIUM BROMIDE AND ALBUTEROL SULFATE 1 AMPULE: .5; 3 SOLUTION RESPIRATORY (INHALATION) at 13:11

## 2019-04-15 RX ADMIN — OXYCODONE HYDROCHLORIDE 5 MG: 5 TABLET ORAL at 05:33

## 2019-04-15 RX ADMIN — INSULIN LISPRO 3 UNITS: 100 INJECTION, SOLUTION INTRAVENOUS; SUBCUTANEOUS at 12:00

## 2019-04-15 RX ADMIN — CILOSTAZOL 100 MG: 100 TABLET ORAL at 10:54

## 2019-04-15 RX ADMIN — ENOXAPARIN SODIUM 40 MG: 40 INJECTION SUBCUTANEOUS at 08:34

## 2019-04-15 RX ADMIN — CEFEPIME 2 G: 2 INJECTION, POWDER, FOR SOLUTION INTRAVENOUS at 12:48

## 2019-04-15 RX ADMIN — ONDANSETRON 4 MG: 2 INJECTION INTRAMUSCULAR; INTRAVENOUS at 05:33

## 2019-04-15 RX ADMIN — IPRATROPIUM BROMIDE AND ALBUTEROL SULFATE 1 AMPULE: .5; 3 SOLUTION RESPIRATORY (INHALATION) at 07:06

## 2019-04-15 RX ADMIN — ATORVASTATIN CALCIUM 40 MG: 40 TABLET, FILM COATED ORAL at 08:33

## 2019-04-15 RX ADMIN — INSULIN LISPRO 3 UNITS: 100 INJECTION, SOLUTION INTRAVENOUS; SUBCUTANEOUS at 08:34

## 2019-04-15 RX ADMIN — INSULIN LISPRO 12 UNITS: 100 INJECTION, SOLUTION INTRAVENOUS; SUBCUTANEOUS at 17:19

## 2019-04-15 RX ADMIN — PREDNISONE 20 MG: 20 TABLET ORAL at 08:34

## 2019-04-15 RX ADMIN — IPRATROPIUM BROMIDE AND ALBUTEROL SULFATE 1 AMPULE: .5; 3 SOLUTION RESPIRATORY (INHALATION) at 20:30

## 2019-04-15 RX ADMIN — Medication 10 ML: at 20:52

## 2019-04-15 RX ADMIN — CHOLECALCIFEROL TAB 125 MCG (5000 UNIT) 10000 UNITS: 125 TAB at 17:19

## 2019-04-15 RX ADMIN — CILOSTAZOL 100 MG: 100 TABLET ORAL at 20:52

## 2019-04-15 RX ADMIN — INSULIN GLARGINE 10 UNITS: 100 INJECTION, SOLUTION SUBCUTANEOUS at 20:55

## 2019-04-15 ASSESSMENT — PAIN SCALES - GENERAL
PAINLEVEL_OUTOF10: 0
PAINLEVEL_OUTOF10: 10

## 2019-04-15 NOTE — CARE COORDINATION
INTERDISCIPLINARY PLAN OF CARE CONFERENCE    Date/Time: 4/15/2019 11:56 AM  Completed by: David Eddy Case Management      Patient Name:  Bruno Moreno  YOB: 1934  Admitting Diagnosis: HCAP (healthcare-associated pneumonia) [J18.9]  Acute hypoxemic respiratory failure ([address] [J96.01]     Admit Date/Time:  4/10/2019  4:40 AM    Chart reviewed. Interdisciplinary team met to discuss patient progress and discharge plans. Disciplines included Case Management, Nursing, and Dietitian. Current Status:stable    PT/OT recommendation:return to LTC    Anticipated Discharge Date: tbd  Expected D/C Disposition:  Nursing Home  Confirmed plan with patient and/or family Yes  Discharge Plan Comments: Chart reviewed. Plan continues for pt to return to [address] [place] [address] @ [facility] in [facility].       Home O2 in place on admit: ecf  Pt informed of need to bring portable home O2 tank on day of discharge for nursing to connect prior to leaving:  Not Indicated  Verbalized agreement/Understanding:  Not Indicated

## 2019-04-15 NOTE — PROGRESS NOTES
Shift assessment complete see flowsheets; medication administered see MAR  Call light and personal belongings within reach. Patient denies further needs.

## 2019-04-15 NOTE — PLAN OF CARE
Problem: Falls - Risk of:  Goal: Will remain free from falls  Description  Will remain free from falls  4/14/2019 2022 by Senait Vernon RN  Outcome: Ongoing     Problem: Risk for Impaired Skin Integrity  Goal: Tissue integrity - skin and mucous membranes  Description  Structural intactness and normal physiological function of skin and  mucous membranes.   4/14/2019 2022 by Senait Vernon RN  Outcome: Ongoing     Problem: Airway Clearance - Ineffective:  Goal: Clear lung sounds  Description  Clear lung sounds  Outcome: Ongoing

## 2019-04-15 NOTE — PROGRESS NOTES
Pulmonary Progress Note    CC: Acute hypoxemic respiratory failure    Subjective:   No sputum production          Intake/Output Summary (Last 24 hours) at 4/15/2019 0708  Last data filed at 4/15/2019 0307  Gross per 24 hour   Intake 380 ml   Output 2300 ml   Net -1920 ml       Exam:   BP (!) 142/67   Pulse 76   Temp 98.4 °F (36.9 °C) (Oral)   Resp 16   Ht 5' 7\" (1.702 m)   Wt 180 lb (81.6 kg)   SpO2 93%   BMI 28.19 kg/m²  on 4 L O2  Gen: No distress. Alert. Eyes: PERRL. No sclera icterus. No conjunctival injection. ENT: No discharge. Pharynx clear. Neck: Trachea midline. Normal thyroid. Resp: No accessory muscle use. Basilar crackles. No wheezes. No rhonchi. No dullness on percussion. CV: Regular rate. Regular rhythm. No murmur or rub. No edema. GI: Non-tender. Non-distended. No masses. No organomegaly. Normal bowel sounds. No hernia. Skin: Warm and dry. No nodule on exposed extremities. No rash on exposed extremities. Lymph: No cervical LAD. No supraclavicular LAD. M/S: No cyanosis. No joint deformity. No clubbing. Neuro: Awake. Moves all extremities. CN grossly intact. Psych: Oriented x 3. No anxiety or agitation.      Scheduled Meds:   vancomycin  750 mg Intravenous Once    predniSONE  20 mg Oral Daily    enoxaparin  40 mg Subcutaneous Daily    glipiZIDE  5 mg Oral QAM AC    insulin glargine  10 Units Subcutaneous Nightly    ipratropium-albuterol  1 ampule Inhalation TID    aspirin  81 mg Oral Daily    atorvastatin  40 mg Oral Daily    cilostazol  100 mg Oral BID    gabapentin  100 mg Oral Daily    guaiFENesin  600 mg Oral Daily    pantoprazole  40 mg Oral QAM AC    vitamin D  50,000 Units Oral Weekly    sodium chloride flush  10 mL Intravenous 2 times per day    insulin lispro  0-18 Units Subcutaneous TID WC    insulin lispro  0-9 Units Subcutaneous Nightly    cefepime  2 g Intravenous Q24H    vancomycin (VANCOCIN) intermittent dosing (placeholder)   Other RX Placeholder     Continuous Infusions:   dextrose       PRN Meds:  oxyCODONE, nitroGLYCERIN, sodium chloride flush, magnesium hydroxide, ondansetron, glucose, dextrose, glucagon (rDNA), dextrose, ondansetron    Labs:  CBC:   Recent Labs     04/13/19  0522 04/14/19  0650 04/15/19  0459   WBC 15.1* 11.6* 13.0*   HGB 10.1* 9.4* 10.5*   HCT 32.1* 29.4* 33.1*   MCV 78.3* 76.8* 78.2*    334 375     BMP:   Recent Labs     04/13/19  0522 04/14/19  0650 04/15/19  0459    145 141   K 4.1 4.2 3.9    110 105   CO2 22 22 21   BUN 53* 48* 41*   CREATININE 1.9* 1.6* 1.6*     LIVER PROFILE: No results for input(s): AST, ALT, LIPASE, BILIDIR, BILITOT, ALKPHOS in the last 72 hours. Invalid input(s): AMYLASE,  ALB  PT/INR: No results for input(s): PROTIME, INR in the last 72 hours. APTT: No results for input(s): APTT in the last 72 hours. UA:No results for input(s): NITRITE, COLORU, PHUR, LABCAST, WBCUA, RBCUA, MUCUS, TRICHOMONAS, YEAST, BACTERIA, CLARITYU, SPECGRAV, LEUKOCYTESUR, UROBILINOGEN, BILIRUBINUR, BLOODU, GLUCOSEU, AMORPHOUS in the last 72 hours. Invalid input(s): KETONESU  No results for input(s): PHART, JFD2CAJ, PO2ART in the last 72 hours.   Cultures:   Blood culture negative  Flu negative    Films:  CXR 4/10 reviewed by me and showed multifocal airspace disease with pleural effusion      ASSESSMENT:  · Acute hypoxemic respiratory failure  · Healthcare associated pneumonia - probable gram negative, risk for methicillin resistant Staph aureus as well  · Pleural effusion- favor parapneumonic  · CAP and post pacemaker        PLAN:  · Supplemental oxygen to maintain SaO2 >92%; wean as tolerated  · Vancomycin and cefepime day #6  · Prednisone taper  · Inhaled bronchodilators  · Repeat chest x-ray to reevaluate pleural effusion

## 2019-04-15 NOTE — CONSULTS
4-15-19 (0600) vancomycin trough 16.9. Please give vancomycin 750mg ivpb x1 dose at 1000 on 4-15-19. Please recheck vancomycin trough 4-16-19 at 0600 and pulse dose vancomycin per pharmacy protocol. [address] [place] [address]. Ph.  4/15/2019 6:36 AM

## 2019-04-15 NOTE — PROGRESS NOTES
RESPIRATORY THERAPY ASSESSMENT    Name:  [facility] Record Number:  [id number]  Age: 80 y.o. Gender: male  : 1934  Today's Date:  4/15/2019  Room:  [facility]02    Assessment     Is the patient being admitted for a COPD or Asthma exacerbation? No   (If yes the patient will be seen every 4 hours for the first 24 hours and then reassessed)    Patient Admission Diagnosis      Allergies  Allergies   Allergen Reactions    No Known Allergies        Minimum Predicted Vital Capacity:     1006          Actual Vital Capacity:      Pt unable to perform at this time              Pulmonary History:COPD  Home Oxygen Therapy:   none  Home Respiratory Therapy: Albuterol 4x daily, Symbicort BID   Current Respiratory Therapy:   Duoneb TID  Treatment Type: HHN  Medications: Albuterol/Ipratropium    Respiratory Severity Index(RSI)   Patients with orders for inhalation medications, oxygen, or any therapeutic treatment modality will be placed on Respiratory Protocol. They will be assessed with the first treatment and at least every 72 hours thereafter. The following severity scale will be used to determine frequency of treatment intervention.     Smoking History: Pulmonary Disease or Smoking History, Greater than 15 pack year = 2    Social History  Social History     Tobacco Use    Smoking status: Former Smoker    Smokeless tobacco: Never Used    Tobacco comment: prior to    Substance Use Topics    Alcohol use: No    Drug use: No       Recent Surgical History: None = 0  Past Surgical History  Past Surgical History:   Procedure Laterality Date    EYE SURGERY      PACEMAKER INSERTION         Level of Consciousness: Alert, Follows Commands but Disoriented = 1    Level of Activity: Mostly sedentary, minimal walking = 2    Respiratory Pattern: Regular Pattern; RR 8-20 = 0    Breath Sounds: Diminshed bilaterally and/or crackles = 2    Sputum  Sputum Color: Unable to assess,  , Sputum How Obtained: None  Cough: Strong, spontaneous, non-productive = 0    Vital Signs   BP (!) 144/74   Pulse 74   Temp 98.1 °F (36.7 °C) (Oral)   Resp 16   Ht 5' 7\" (1.702 m)   Wt 180 lb (81.6 kg)   SpO2 92%   BMI 28.19 kg/m²   SPO2 (COPD values may differ): 90-91% on room air or greater than 92% on FiO2 24- 28% = 1    Peak Flow (asthma only): not applicable = 0    RSI: 7-8 = BID and Q4HPRN (every four hours as needed) for dyspnea        Plan       Goals: medication delivery, mobilize retained secretions, volume expansion and improve oxygenation    Patient/caregiver was educated on the proper method of use for Respiratory Care Devices:  Yes      Level of patient/caregiver understanding able to:   ? Verbalize understanding   ? Demonstrate understanding       ? Teach back        ? Needs reinforcement       ? No available caregiver               ? Other:     Response to education:  Good     Is patient being placed on Home Treatment Regimen? NA     Does the patient have everything they need prior to discharge? NA     Comments: chart reviewed and pt assessed    Plan of Care: pt to remain on current regiment    Electronically signed by Neva Silveira RCP on 4/15/2019 at 7:34 AM    Respiratory Protocol Guidelines     1. Assessment and treatment by Respiratory Therapy will be initiated for medication and therapeutic interventions upon initiation of aerosolized medication. 2. Physician will be contacted for respiratory rate (RR) greater than 35 breaths per minute. Therapy will be held for heart rate (HR) greater than 140 beats per minute, pending direction from physician. 3. Bronchodilators will be administered via Metered Dose Inhaler (MDI) with spacer when the following criteria are met:  a. Alert and cooperative     b. HR < 140 bpm  c. RR < 30 bpm                d. Can demonstrate a 2-3 second inspiratory hold  4.  Bronchodilators will be administered via Hand Held Nebulizer [facility] to patients when ANY of the following

## 2019-04-15 NOTE — PROGRESS NOTES
Patient at times crying today states \"im so a burden; I feel like a big baby\" This nurse reassured patient he is not a burden and he needs some help at this time due to not feeling well\". Patient up in chair. Patient did not eat much at meals states \"Im not really hunger\" Encouraged patient to eat what he could.

## 2019-04-15 NOTE — PROGRESS NOTES
This nurse was informed that patient had oxygen off and monitor off tried to pull iv out. Patient oxygen saturation was 78. Increased anxiety due to not being able to catch . Patient was using accessory muscles and kept saying I cant breathe. Patient oxygen placed on and increased to 3 l. Oxygen is now 96. Patient instructed to leave oxygen on and patient is calm at this time.

## 2019-04-15 NOTE — PROGRESS NOTES
Inpatient Physical Therapy Daily Treatment Note    Unit: 2w  Date:  4/15/2019  Patient Name:    Joshua Donis \"Apolinar\"  Admitting diagnosis:  HCAP (healthcare-associated pneumonia) [J18.9]  Acute hypoxemic respiratory failure ([address] [J96.01]  Admit Date:  4/10/2019  Precautions/Restrictions/WB Status/ Lines/ Wounds/ Oxygen: Fall Risk, IV, telemetry, 3 L oxygen. Treatment Time: 1417 - 1436  Treatment Number: 3   Timed Code Treatment Minutes:  41 minutes  Total Treatment Minutes:   41 minutes    Discharge Recommendations: LTC/ ECF with PT services  DME needs for discharge: defer to facility    Home Health S4 Level Recommendation:  NA  AM-PAC Mobility Score    AM-PAC Inpatient Mobility without Stair Climbing Raw Score : 14     Subjective  Patient sitting up in chair with no family present . Pt agreeable to this PT tx. Cognition  Pt A&Ox4  Pt very pleasant and talkative through session. Pain  None, \"I had my goofy pill\"    Balance  Static Sitting:  Good    Tolerance: WNL  Dynamic Sitting:  Good   Static Standing: Fair +   Tolerance: ~3 min then pt became dizzy   Dynamic Standing: Fair -    Bed Mobility   Supine to Sit:   Not Tested  Sit to Supine:  Not Tested  Rolling:   Not Tested  Scooting at EOB: Supervision  Scooting to [facility]:  Not Tested    Transfer Training     Sit to stand:   CGA  Stand to sit:   CGA  Bed to Chair:  Not Tested with use of N/A     Pre-Gait gait completed as indicated below   Standing march: x20 with RW & CGA  When attempting a second standing TE, pt abruptly became dizzy and required to sit down. Activity Tolerance   Pt completed therapy session with Dizziness and SOB noted w/activity    SpO2: 95% with Cecelia, 93% post standing Cecelia  HR: 71bpm     Positioning Needs   Pt sitting up in chair, call light and needs in reach and alarm set .      Therapeutic Exercise all completed bilaterally  Seated heel/toe raises: x20  LAQ: x20  Seated march: x20  Hip abd/add: x20  Shoulder press: x15  Forward punches: x15  Cross-body punches: x15    Patient/Family Education   Pt educated on role of inpatient PT, POC, importance of continued activity, calling for assist with mobility. Assessment  Pt participating well today, especially with seated Cecelia. Standing time/Cecelia limited by onset of dizziness. Pt required no more than CGA for today's activity. Continue to recommend pt return to LTC facility with PT services when medically appropriate to safely progress IND with functional mobility and to reduce risk for falls. Goals : all goals ongoing, unless indicated   To be met in 3 visits:  1). Tolerate LE Ex x 10 reps - Met 4/15    To be met in 6 visits:  1). Supine to/from sit: Independent  2). Sit to/from stand: Independent  3). Bed to chair: CGA and with use of RW  4). Gait: Ambulate 50 ft  with  CGA  and use of RW  5). Tolerate B LE exercises 20 reps    Plan    Continue with plan of care. Ruddy Ulloa, PT, DPT #[id number]     If patient discharges from this facility prior to next visit, this note will serve as the Discharge Summary.

## 2019-04-15 NOTE — PROGRESS NOTES
Bedside report given and care transferred to [facility], RN, pt in stable condition.

## 2019-04-15 NOTE — PROGRESS NOTES
Progress Note    Admit Date:  4/10/2019    Subjective:  Mr. Walt Perezh he is nauseated today  Feels  sob  Off tidwell    Objective:   Patient Vitals for the past 4 hrs:   BP Temp Temp src Pulse Resp SpO2   04/15/19 1104 131/74 97.8 °F (36.6 °C) Oral 70 16 97 %   04/15/19 0800 (!) 142/75 97 °F (36.1 °C) Oral 62 16 91 %   04/15/19 0732 (!) 144/74 98.1 °F (36.7 °C) Oral 74 16 92 %       Intake/Output Summary (Last 24 hours) at 4/15/2019 1131  Last data filed at 4/15/2019 1017  Gross per 24 hour   Intake 380 ml   Output 1400 ml   Net -1020 ml       Physical Exam:      General:  eldelry male Awake, alert and oriented. Appears to be not in any distress  Mucous Membranes:  Pink , anicteric  Neck: No JVD, no carotid bruit, no thyromegaly  Chest:  Clear to auscultation bilaterally, diminished in bases   Left side pacer  Cardiovascular:  RRR S1S2 heard, no murmurs or gallops  Abdomen:  Soft, undistended, non tender, no organomegaly, BS present  Extremities: right ankle lateral superficial ulcer  Scattered crusted small wounds on toes   No edema or cyanosis.  Distal pulses well felt  Neurological : grossly normal        Scheduled Meds:   predniSONE  20 mg Oral Daily    enoxaparin  40 mg Subcutaneous Daily    glipiZIDE  5 mg Oral QAM AC    insulin glargine  10 Units Subcutaneous Nightly    ipratropium-albuterol  1 ampule Inhalation TID    aspirin  81 mg Oral Daily    atorvastatin  40 mg Oral Daily    cilostazol  100 mg Oral BID    gabapentin  100 mg Oral Daily    guaiFENesin  600 mg Oral Daily    pantoprazole  40 mg Oral QAM AC    vitamin D  50,000 Units Oral Weekly    sodium chloride flush  10 mL Intravenous 2 times per day    insulin lispro  0-18 Units Subcutaneous TID WC    insulin lispro  0-9 Units Subcutaneous Nightly    cefepime  2 g Intravenous Q24H    vancomycin (VANCOCIN) intermittent dosing (placeholder)   Other RX Placeholder       Continuous Infusions:   dextrose         PRN Meds:  oxyCODONE, nitroGLYCERIN, sodium chloride flush, magnesium hydroxide, ondansetron, glucose, dextrose, glucagon (rDNA), dextrose, ondansetron      Data:  CBC:   Recent Labs     04/13/19  0522 04/14/19  0650 04/15/19  0459   WBC 15.1* 11.6* 13.0*   HGB 10.1* 9.4* 10.5*   HCT 32.1* 29.4* 33.1*   MCV 78.3* 76.8* 78.2*    334 375     BMP:   Recent Labs     04/13/19  0522 04/14/19  0650 04/15/19  0459    145 141   K 4.1 4.2 3.9    110 105   CO2 22 22 21   BUN 53* 48* 41*   CREATININE 1.9* 1.6* 1.6*     LIVER PROFILE:   No results for input(s): AST, ALT, LIPASE, BILIDIR, BILITOT, ALKPHOS in the last 72 hours. Invalid input(s): AMYLASE,  ALB  CULTURES    Blood: NGTD  Rapid flu: negative      RADIOLOGY  XR CHEST PORTABLE   Final Result   Multifocal bilateral pneumonia with bilateral pleural effusions. Assessment/Plan:    Acute respiratory failure with hypoxia  - Patient was placed on BiPAP and admitted to ICU   - He improved   - pt O2 sats dropped to upper 80s, now on 4L NC     Healthcare associated pneumonia   - due to gram-negative organism or MRSA  - imaging with multifocal pna  - Pt is a NH resident  - started on vanc and cefepime D#5  - Cultures are NG so far- no fevers    COPD exacerbation  - solumedrol for now  - inhaled bronchodilators    - wean steroids      Acute kidney injury   - possibly from prerenal azotemia. Baseline unclear- possible CKD  - Crt was 2.0 on admission. He was placed on IV fluids. His Crt has increased to 2.4  - increase rate of IV NS and cont to monitor BMP   - baseline -1.8   D/c iv fluids     Sepsis  - 2/2 above  - mgmt with IVF, BP stable  - resolved  -- BP stable    Leukocytosis    - 16.8 on arrival  - 2/2 above  - repeat 20.7- 15, improving        CAD s.p CABG  S.p pacer    Nonspecific elevation of troponin  - No ACS.  Likely with  respiratory issues.       Diabetes mellitus type 2  - resume glipizide  - BG stable on ssi    Remove tidwell    Start PT/OT    DVT Prophylaxis: Lovenox   Diet: DIET CARB CONTROL;  Code Status: Full Code    Dispo: continue care  Will go back to long term care on discharge       Kori Michelle MD 4/15/2019 11:31 AM

## 2019-04-16 ENCOUNTER — APPOINTMENT (OUTPATIENT)
Dept: GENERAL RADIOLOGY | Age: 84
DRG: 871 | End: 2019-04-16
Payer: MEDICARE

## 2019-04-16 ENCOUNTER — APPOINTMENT (OUTPATIENT)
Dept: ULTRASOUND IMAGING | Age: 84
DRG: 871 | End: 2019-04-16
Payer: MEDICARE

## 2019-04-16 VITALS
DIASTOLIC BLOOD PRESSURE: 68 MMHG | HEIGHT: 67 IN | OXYGEN SATURATION: 92 % | WEIGHT: 180 LBS | TEMPERATURE: 97.1 F | SYSTOLIC BLOOD PRESSURE: 136 MMHG | HEART RATE: 70 BPM | RESPIRATION RATE: 16 BRPM | BODY MASS INDEX: 28.25 KG/M2

## 2019-04-16 LAB
ANION GAP SERPL CALCULATED.3IONS-SCNC: 12 MMOL/L (ref 3–16)
BUN BLDV-MCNC: 41 MG/DL (ref 7–20)
CALCIUM SERPL-MCNC: 8.9 MG/DL (ref 8.3–10.6)
CHLORIDE BLD-SCNC: 101 MMOL/L (ref 99–110)
CO2: 24 MMOL/L (ref 21–32)
CREAT SERPL-MCNC: 1.7 MG/DL (ref 0.8–1.3)
GFR AFRICAN AMERICAN: 47
GFR NON-AFRICAN AMERICAN: 39
GLUCOSE BLD-MCNC: 200 MG/DL (ref 70–99)
GLUCOSE BLD-MCNC: 235 MG/DL (ref 70–99)
GLUCOSE BLD-MCNC: 261 MG/DL (ref 70–99)
GLUCOSE BLD-MCNC: 263 MG/DL (ref 70–99)
GLUCOSE BLD-MCNC: 270 MG/DL (ref 70–99)
INR BLD: 1.32 (ref 0.86–1.14)
LACTATE DEHYDROGENASE: 238 U/L (ref 100–190)
PERFORMED ON: ABNORMAL
POTASSIUM SERPL-SCNC: 3.9 MMOL/L (ref 3.5–5.1)
PROTHROMBIN TIME: 15.1 SEC (ref 9.8–13)
SODIUM BLD-SCNC: 137 MMOL/L (ref 136–145)
TOTAL PROTEIN: 6.4 G/DL (ref 6.4–8.2)
VANCOMYCIN TROUGH: 16.2 UG/ML (ref 10–20)

## 2019-04-16 PROCEDURE — 80048 BASIC METABOLIC PNL TOTAL CA: CPT

## 2019-04-16 PROCEDURE — 99239 HOSP IP/OBS DSCHRG MGMT >30: CPT | Performed by: INTERNAL MEDICINE

## 2019-04-16 PROCEDURE — 99233 SBSQ HOSP IP/OBS HIGH 50: CPT | Performed by: INTERNAL MEDICINE

## 2019-04-16 PROCEDURE — 76604 US EXAM CHEST: CPT

## 2019-04-16 PROCEDURE — 2580000003 HC RX 258: Performed by: INTERNAL MEDICINE

## 2019-04-16 PROCEDURE — 36415 COLL VENOUS BLD VENIPUNCTURE: CPT

## 2019-04-16 PROCEDURE — 6370000000 HC RX 637 (ALT 250 FOR IP): Performed by: INTERNAL MEDICINE

## 2019-04-16 PROCEDURE — 84155 ASSAY OF PROTEIN SERUM: CPT

## 2019-04-16 PROCEDURE — 85610 PROTHROMBIN TIME: CPT

## 2019-04-16 PROCEDURE — 71046 X-RAY EXAM CHEST 2 VIEWS: CPT

## 2019-04-16 PROCEDURE — 6360000002 HC RX W HCPCS: Performed by: INTERNAL MEDICINE

## 2019-04-16 PROCEDURE — 80202 ASSAY OF VANCOMYCIN: CPT

## 2019-04-16 PROCEDURE — 6360000002 HC RX W HCPCS: Performed by: HOSPITALIST

## 2019-04-16 PROCEDURE — 83615 LACTATE (LD) (LDH) ENZYME: CPT

## 2019-04-16 RX ORDER — IPRATROPIUM BROMIDE AND ALBUTEROL SULFATE 2.5; .5 MG/3ML; MG/3ML
1 SOLUTION RESPIRATORY (INHALATION) 4 TIMES DAILY
Status: DISCONTINUED | OUTPATIENT
Start: 2019-04-16 | End: 2019-04-16 | Stop reason: HOSPADM

## 2019-04-16 RX ORDER — PREDNISONE 20 MG/1
TABLET ORAL
Qty: 5 TABLET | Refills: 0
Start: 2019-04-16 | End: 2019-08-19

## 2019-04-16 RX ORDER — LEVOFLOXACIN 500 MG/1
500 TABLET, FILM COATED ORAL DAILY
Qty: 4 TABLET | Refills: 0
Start: 2019-04-16 | End: 2019-04-20

## 2019-04-16 RX ADMIN — PANTOPRAZOLE SODIUM 40 MG: 40 TABLET, DELAYED RELEASE ORAL at 06:13

## 2019-04-16 RX ADMIN — PREDNISONE 20 MG: 20 TABLET ORAL at 08:19

## 2019-04-16 RX ADMIN — INSULIN LISPRO 6 UNITS: 100 INJECTION, SOLUTION INTRAVENOUS; SUBCUTANEOUS at 16:55

## 2019-04-16 RX ADMIN — CILOSTAZOL 100 MG: 100 TABLET ORAL at 08:19

## 2019-04-16 RX ADMIN — ENOXAPARIN SODIUM 40 MG: 40 INJECTION SUBCUTANEOUS at 08:21

## 2019-04-16 RX ADMIN — GABAPENTIN 100 MG: 100 CAPSULE ORAL at 08:20

## 2019-04-16 RX ADMIN — OXYCODONE HYDROCHLORIDE 5 MG: 5 TABLET ORAL at 00:02

## 2019-04-16 RX ADMIN — Medication 10 ML: at 08:21

## 2019-04-16 RX ADMIN — VANCOMYCIN HYDROCHLORIDE 750 MG: 100 INJECTION, POWDER, LYOPHILIZED, FOR SOLUTION INTRAVENOUS at 10:30

## 2019-04-16 RX ADMIN — IPRATROPIUM BROMIDE AND ALBUTEROL SULFATE 1 AMPULE: .5; 3 SOLUTION RESPIRATORY (INHALATION) at 07:27

## 2019-04-16 RX ADMIN — GUAIFENESIN 600 MG: 600 TABLET, EXTENDED RELEASE ORAL at 08:19

## 2019-04-16 RX ADMIN — INSULIN LISPRO 6 UNITS: 100 INJECTION, SOLUTION INTRAVENOUS; SUBCUTANEOUS at 11:21

## 2019-04-16 RX ADMIN — INSULIN LISPRO 3 UNITS: 100 INJECTION, SOLUTION INTRAVENOUS; SUBCUTANEOUS at 08:18

## 2019-04-16 RX ADMIN — GLIPIZIDE 5 MG: 5 TABLET ORAL at 08:19

## 2019-04-16 RX ADMIN — ASPIRIN 81 MG 81 MG: 81 TABLET ORAL at 08:19

## 2019-04-16 RX ADMIN — ATORVASTATIN CALCIUM 40 MG: 40 TABLET, FILM COATED ORAL at 08:19

## 2019-04-16 ASSESSMENT — PAIN SCALES - GENERAL
PAINLEVEL_OUTOF10: 6
PAINLEVEL_OUTOF10: 0

## 2019-04-16 NOTE — PROGRESS NOTES
Patient traveling by squad back to [facility]. Patient stable for transport. No distress noted.

## 2019-04-16 NOTE — CARE COORDINATION
DISCHARGE ORDER  Date/Time 2019 1:24 PM  Completed by: Jc Smith, Case Management    Patient Name: April Wright    : 1934  Admitting Diagnosis: HCAP (healthcare-associated pneumonia) [J18.9]  Acute hypoxemic respiratory failure ([facility] [J96.01]  Admit Date/Time: 4/10/2019  4:40 AM    Noted discharge order. Confirmed discharge plan with patient / family (pt, [facility]) and Reba Halsted ([phone] via voicemail): Yes   Discharge Plan: Reviewed chart. Role of discharge planner explained and patient verbalized understanding. Discharge order is noted. Pt is being d/c'd back to [address] today via [address] [facility]) at 1700. Pt's O2 sats are 93% on 3L.  called Herminia Duff at [facility] ([phone] ext [phone]) and informed her and she requested that LEXII/AVS be faxed to [phone].  informed Chrissie Castillo RN, as well. Pt still needs a thoracentesis today at 1400pm. If discharge is cancelled, then [address] will need to be called ([phone]), as will [address] [place] [address]. Chrissie Castillo RN and Genna Clemons (unit [place]) aware. No further discharge needs needed or noted. Discharge orders and Continuity of Care faxed to facility: Yes  Hospital Exemption Notification System complete: No  Trans[address] arranged: Yes - [address] ([address])  [facility] @ 1700. [facility]  and left message for Minoo Maganastefany (daughter) [phone]) aware of  time: Yes   Nursing aware of  time: Yes, Chrissie Castillo RN  Receiving facility aware of  time: Yes, Daryel Fees  Pre-cert obtained?   No

## 2019-04-16 NOTE — PROGRESS NOTES
Shift assessment completed:    Alert and Oriented x2-3, patient confused this morning. Vitals are WNL. Respiratory status is regular, easy, unlabored, and SpO2 is 96% on RA. Pain is 0/10. Pt denies any other complications at this time. SR up 2/4. Bed in lowest position. Call light within reach. Bed alarm is on. Will monitor. Telesitter in place.

## 2019-04-16 NOTE — DISCHARGE SUMMARY
carefully, and ask your doctor or other care provider to review them with you. STOP taking these medications    chlorthalidone 25 MG tablet  Commonly known as:  HYGROTON     dextromethorphan-guaiFENesin  MG per extended release tablet  Commonly known as:  MUCINEX DM     guaiFENesin 600 MG extended release tablet  Commonly known as:  MUCINEX     oxyCODONE 5 MG capsule           Where to Get Your Medications      Information about where to get these medications is not yet available    Ask your nurse or doctor about these medications  · levofloxacin 500 MG tablet  · predniSONE 20 MG tablet           Discharged in stable condition to [facility]     Follow Up: Follow up with PCP in 1 week     Total time spent on discharge is 35 minutes    MARK Cornejo.

## 2019-04-16 NOTE — PROGRESS NOTES
Pulmonary Progress Note    CC: Acute hypoxemic respiratory failure    Subjective:   Continues to improve  No sputum production  Room air        Intake/Output Summary (Last 24 hours) at 4/16/2019 0702  Last data filed at 4/16/2019 0503  Gross per 24 hour   Intake 1200 ml   Output 2350 ml   Net -1150 ml       Exam:   /63   Pulse 72   Temp 98.1 °F (36.7 °C) (Oral)   Resp 16   Ht 5' 7\" (1.702 m)   Wt 180 lb (81.6 kg)   SpO2 92%   BMI 28.19 kg/m²  on RA  Gen: No distress. Alert. Eyes: PERRL. No sclera icterus. No conjunctival injection. ENT: No discharge. Pharynx clear. Neck: Trachea midline. Normal thyroid. Resp: No accessory muscle use. Basilar crackles. No wheezes. No rhonchi. R dullness on percussion. CV: Regular rate. Regular rhythm. No murmur or rub. No edema. GI: Non-tender. Non-distended. No masses. No organomegaly. Normal bowel sounds. No hernia. Skin: Warm and dry. No nodule on exposed extremities. No rash on exposed extremities. Lymph: No cervical LAD. No supraclavicular LAD. M/S: No cyanosis. No joint deformity. No clubbing. Neuro: Awake. Moves all extremities. CN grossly intact. Psych: Oriented x 3. No anxiety or agitation.      Scheduled Meds:   vancomycin  750 mg Intravenous Once    vitamin D3  10,000 Units Oral Weekly    predniSONE  20 mg Oral Daily    enoxaparin  40 mg Subcutaneous Daily    glipiZIDE  5 mg Oral QAM AC    insulin glargine  10 Units Subcutaneous Nightly    ipratropium-albuterol  1 ampule Inhalation TID    aspirin  81 mg Oral Daily    atorvastatin  40 mg Oral Daily    cilostazol  100 mg Oral BID    gabapentin  100 mg Oral Daily    guaiFENesin  600 mg Oral Daily    pantoprazole  40 mg Oral QAM AC    sodium chloride flush  10 mL Intravenous 2 times per day    insulin lispro  0-18 Units Subcutaneous TID WC    insulin lispro  0-9 Units Subcutaneous Nightly    cefepime  2 g Intravenous Q24H    vancomycin (VANCOCIN) intermittent dosing (placeholder)   Other RX Placeholder     Continuous Infusions:   dextrose       PRN Meds:  oxyCODONE, nitroGLYCERIN, sodium chloride flush, magnesium hydroxide, ondansetron, glucose, dextrose, glucagon (rDNA), dextrose, ondansetron    Labs:  CBC:   Recent Labs     04/14/19  0650 04/15/19  0459   WBC 11.6* 13.0*   HGB 9.4* 10.5*   HCT 29.4* 33.1*   MCV 76.8* 78.2*    375     BMP:   Recent Labs     04/14/19  0650 04/15/19  0459 04/16/19  0555    141 137   K 4.2 3.9 3.9    105 101   CO2 22 21 24   BUN 48* 41* 41*   CREATININE 1.6* 1.6* 1.7*     LIVER PROFILE: No results for input(s): AST, ALT, LIPASE, BILIDIR, BILITOT, ALKPHOS in the last 72 hours. Invalid input(s): AMYLASE,  ALB  PT/INR: No results for input(s): PROTIME, INR in the last 72 hours. APTT: No results for input(s): APTT in the last 72 hours. UA:No results for input(s): NITRITE, COLORU, PHUR, LABCAST, WBCUA, RBCUA, MUCUS, TRICHOMONAS, YEAST, BACTERIA, CLARITYU, SPECGRAV, LEUKOCYTESUR, UROBILINOGEN, BILIRUBINUR, BLOODU, GLUCOSEU, AMORPHOUS in the last 72 hours. Invalid input(s): KETONESU  No results for input(s): PHART, CVH7YYI, PO2ART in the last 72 hours.   Cultures:   Blood culture negative  Flu negative    Films:  CXR 4/16 reviewed by me and showed multifocal airspace disease with pleural effusion- significant improvement from prior x-ray      ASSESSMENT:  · Acute hypoxemic respiratory failure  · Healthcare associated pneumonia - probable gram negative, risk for methicillin resistant Staph aureus as well  · Pleural effusion- small loculated on repeat chest x-ray  · CAP and post pacemaker        PLAN:  · Supplemental oxygen to maintain SaO2 >92%; wean as tolerated  · Vancomycin and cefepime day #7  · Prednisone taper  · Inhaled bronchodilators  · Right-sided thoracentesis  · Discussed with internal medicine

## 2019-04-16 NOTE — PROGRESS NOTES
dextrose, glucagon (rDNA), dextrose, ondansetron      Data:  CBC:   Recent Labs     04/14/19  0650 04/15/19  0459   WBC 11.6* 13.0*   HGB 9.4* 10.5*   HCT 29.4* 33.1*   MCV 76.8* 78.2*    375     BMP:   Recent Labs     04/14/19  0650 04/15/19  0459 04/16/19  0555    141 137   K 4.2 3.9 3.9    105 101   CO2 22 21 24   BUN 48* 41* 41*   CREATININE 1.6* 1.6* 1.7*     LIVER PROFILE:   No results for input(s): AST, ALT, LIPASE, BILIDIR, BILITOT, ALKPHOS in the last 72 hours. Invalid input(s): AMYLASE,  ALB  CULTURES    Blood: NGTD  Rapid flu: negative      RADIOLOGY  XR CHEST STANDARD (2 VW)   Final Result   Substantially improved lung aeration. Small possibly partially loculated   pleural effusions remain in addition to patchy airspace disease in the right   lung. Small nodular opacity in the right upper lobe is indeterminate. RECOMMENDATION:   Radiographic follow-up after treatment and symptom resolution is recommended. XR CHEST PORTABLE   Final Result   Multifocal bilateral pneumonia with bilateral pleural effusions. Assessment/Plan:    Acute respiratory failure with hypoxia  - Patient was placed on BiPAP and admitted to ICU   - He improved   - pt O2 sats dropped to upper 80s, now on 3L NC     Healthcare associated pneumonia   - due to gram-negative organism or MRSA  - imaging with multifocal pna  - Pt is a NH resident  - started on vanc and cefepime D#6  - Cultures are NG so far- no fevers  4/16 Repeat CXR reviewed- improved,but there is loculated pleural effusion. Thoracentesis today    COPD exacerbation  - solumedrol for now  - inhaled bronchodilators    - wean steroids      Acute kidney injury   - possibly from prerenal azotemia. Baseline unclear- possible CKD  - Crt was 2.0 on admission. He was placed on IV fluids.  His Crt has increased to 2.4  - increase rate of IV NS and cont to monitor BMP   - baseline -1.8   D/c iv fluids     Sepsis  - 2/2 above  - mgmt with IVF, BP stable  - resolved  -- BP stable    Leukocytosis    - 16.8 on arrival  - 2/2 above  - repeat 20.7- 15, improving        CAD s.p CABG  S.p pacer    Nonspecific elevation of troponin  - No ACS. Likely with  respiratory issues.       Diabetes mellitus type 2  - resume glipizide  - BG stable on ssi    H/o Dementia.      Remove tidwell    Start PT/OT    DVT Prophylaxis: Lovenox   Diet: DIET CARB CONTROL;  Code Status: Full Code    Dispo: continue care  Will go back to long term care on discharge     D/c to NH after thoracentesis    Becky Charlton MD 4/16/2019 11:57 AM

## 2019-04-17 ENCOUNTER — HOSPITAL ENCOUNTER (EMERGENCY)
Age: 84
Discharge: HOME | End: 2019-04-17
Attending: EMERGENCY MEDICINE
Payer: MEDICARE

## 2019-04-17 ENCOUNTER — APPOINTMENT (OUTPATIENT)
Dept: CT IMAGING | Age: 84
End: 2019-04-17
Payer: MEDICARE

## 2019-04-17 ENCOUNTER — APPOINTMENT (OUTPATIENT)
Dept: GENERAL RADIOLOGY | Age: 84
End: 2019-04-17
Payer: MEDICARE

## 2019-04-17 ENCOUNTER — TELEPHONE (OUTPATIENT)
Dept: OTHER | Facility: CLINIC | Age: 84
End: 2019-04-17

## 2019-04-17 VITALS
TEMPERATURE: 97.3 F | DIASTOLIC BLOOD PRESSURE: 71 MMHG | RESPIRATION RATE: 15 BRPM | OXYGEN SATURATION: 99 % | SYSTOLIC BLOOD PRESSURE: 158 MMHG | BODY MASS INDEX: 28.19 KG/M2 | WEIGHT: 180 LBS | HEART RATE: 76 BPM

## 2019-04-17 DIAGNOSIS — J18.9 PNEUMONIA DUE TO ORGANISM: ICD-10-CM

## 2019-04-17 DIAGNOSIS — R41.82 ALTERED MENTAL STATUS, UNSPECIFIED ALTERED MENTAL STATUS TYPE: Primary | ICD-10-CM

## 2019-04-17 DIAGNOSIS — J90 PLEURAL EFFUSION: ICD-10-CM

## 2019-04-17 DIAGNOSIS — J96.91 RESPIRATORY FAILURE WITH HYPOXIA, UNSPECIFIED CHRONICITY (HCC): ICD-10-CM

## 2019-04-17 LAB
A/G RATIO: 0.9 (ref 1.1–2.2)
ALBUMIN SERPL-MCNC: 3.2 G/DL (ref 3.4–5)
ALP BLD-CCNC: 65 U/L (ref 40–129)
ALT SERPL-CCNC: 9 U/L (ref 10–40)
ANION GAP SERPL CALCULATED.3IONS-SCNC: 11 MMOL/L (ref 3–16)
ANISOCYTOSIS: ABNORMAL
AST SERPL-CCNC: 12 U/L (ref 15–37)
BACTERIA: ABNORMAL /HPF
BASOPHILS ABSOLUTE: 0 K/UL (ref 0–0.2)
BASOPHILS RELATIVE PERCENT: 0 %
BILIRUB SERPL-MCNC: 1.2 MG/DL (ref 0–1)
BILIRUBIN URINE: NEGATIVE
BLOOD, URINE: ABNORMAL
BUN BLDV-MCNC: 36 MG/DL (ref 7–20)
BURR CELLS: ABNORMAL
CALCIUM SERPL-MCNC: 9 MG/DL (ref 8.3–10.6)
CHLORIDE BLD-SCNC: 103 MMOL/L (ref 99–110)
CLARITY: CLEAR
CO2: 28 MMOL/L (ref 21–32)
COLOR: YELLOW
CREAT SERPL-MCNC: 1.8 MG/DL (ref 0.8–1.3)
EOSINOPHILS ABSOLUTE: 0.6 K/UL (ref 0–0.6)
EOSINOPHILS RELATIVE PERCENT: 5 %
EPITHELIAL CELLS, UA: ABNORMAL /HPF
GFR AFRICAN AMERICAN: 44
GFR NON-AFRICAN AMERICAN: 36
GLOBULIN: 3.5 G/DL
GLUCOSE BLD-MCNC: 149 MG/DL (ref 70–99)
GLUCOSE URINE: NEGATIVE MG/DL
HCT VFR BLD CALC: 33 % (ref 40.5–52.5)
HEMOGLOBIN: 10.4 G/DL (ref 13.5–17.5)
KETONES, URINE: NEGATIVE MG/DL
LEUKOCYTE ESTERASE, URINE: NEGATIVE
LYMPHOCYTES ABSOLUTE: 1.3 K/UL (ref 1–5.1)
LYMPHOCYTES RELATIVE PERCENT: 12 %
MCH RBC QN AUTO: 24.8 PG (ref 26–34)
MCHC RBC AUTO-ENTMCNC: 31.6 G/DL (ref 31–36)
MCV RBC AUTO: 78.5 FL (ref 80–100)
MICROSCOPIC EXAMINATION: YES
MONOCYTES ABSOLUTE: 0.8 K/UL (ref 0–1.3)
MONOCYTES RELATIVE PERCENT: 7 %
NEUTROPHILS ABSOLUTE: 8.4 K/UL (ref 1.7–7.7)
NEUTROPHILS RELATIVE PERCENT: 76 %
NITRITE, URINE: NEGATIVE
OVALOCYTES: ABNORMAL
PDW BLD-RTO: 18.3 % (ref 12.4–15.4)
PH UA: 5.5 (ref 5–8)
PLATELET # BLD: 353 K/UL (ref 135–450)
PLATELET SLIDE REVIEW: ADEQUATE
PMV BLD AUTO: 6.9 FL (ref 5–10.5)
POIKILOCYTES: ABNORMAL
POLYCHROMASIA: ABNORMAL
POTASSIUM REFLEX MAGNESIUM: 4 MMOL/L (ref 3.5–5.1)
PROTEIN UA: NEGATIVE MG/DL
RBC # BLD: 4.2 M/UL (ref 4.2–5.9)
RBC UA: ABNORMAL /HPF (ref 0–2)
SLIDE REVIEW: ABNORMAL
SODIUM BLD-SCNC: 142 MMOL/L (ref 136–145)
SPECIFIC GRAVITY UA: 1.02 (ref 1–1.03)
TEAR DROP CELLS: ABNORMAL
TOTAL PROTEIN: 6.7 G/DL (ref 6.4–8.2)
URINE REFLEX TO CULTURE: ABNORMAL
URINE TYPE: ABNORMAL
UROBILINOGEN, URINE: 0.2 E.U./DL
WBC # BLD: 11 K/UL (ref 4–11)
WBC UA: ABNORMAL /HPF (ref 0–5)

## 2019-04-17 PROCEDURE — 93005 ELECTROCARDIOGRAM TRACING: CPT | Performed by: EMERGENCY MEDICINE

## 2019-04-17 PROCEDURE — 99285 EMERGENCY DEPT VISIT HI MDM: CPT

## 2019-04-17 PROCEDURE — 71045 X-RAY EXAM CHEST 1 VIEW: CPT

## 2019-04-17 PROCEDURE — 80053 COMPREHEN METABOLIC PANEL: CPT

## 2019-04-17 PROCEDURE — 85025 COMPLETE CBC W/AUTO DIFF WBC: CPT

## 2019-04-17 PROCEDURE — 81001 URINALYSIS AUTO W/SCOPE: CPT

## 2019-04-17 PROCEDURE — 70450 CT HEAD/BRAIN W/O DYE: CPT

## 2019-04-17 NOTE — TELEPHONE ENCOUNTER
Writer contacted [facility], ED provider to inform of 30 day readmission risk. ED provider informed writer of intention to discharge back to NH and follow up recommended with the physician at the [facility].

## 2019-04-17 NOTE — ED NOTES
Patient continues to deny the need/ability to void. Patient aware the physician would like a urine sample. Patient declines a straight cath for specimen collection. Will continue to monitor.       Nyasia Green RN  04/17/19 3167

## 2019-04-17 NOTE — ED NOTES
7582- Call placed to [facility] for transport back [facility] [place] was sitting in our parking lot with crews ready. ETA 1500.       Victoria Hitchcock  04/17/19 1504

## 2019-04-17 NOTE — ED NOTES
Bed: 14  Expected date:   Expected time:   Means of arrival:   Comments:  Nicky Damico  04/17/19 1101

## 2019-04-17 NOTE — ED NOTES
Patient presents to ED via EMS from [facility] for c/o altered mental status. Per EMS report, ECF staff stated to them \"the patient was unresponsive last night and we forgot to tell the physician so he needs to be checked out. \" Patient DC from inpatient unit yesterday with dx of bilateral pna. Patient denies any needs or complaints in triage and is requesting to go home. Patient alert, able to answer orientation questions but frequently gets off topic.       Zhen Ulloa RN  04/17/19 7149

## 2019-04-18 LAB
EKG ATRIAL RATE: 70 BPM
EKG DIAGNOSIS: NORMAL
EKG Q-T INTERVAL: 502 MS
EKG QRS DURATION: 138 MS
EKG QTC CALCULATION (BAZETT): 553 MS
EKG R AXIS: 259 DEGREES
EKG T AXIS: 99 DEGREES
EKG VENTRICULAR RATE: 73 BPM

## 2019-04-18 PROCEDURE — 93010 ELECTROCARDIOGRAM REPORT: CPT | Performed by: INTERNAL MEDICINE

## 2019-07-29 ENCOUNTER — ANESTHESIA (OUTPATIENT)
Dept: ENDOSCOPY | Age: 84
End: 2019-07-29
Payer: MEDICARE

## 2019-07-29 ENCOUNTER — HOSPITAL ENCOUNTER (OUTPATIENT)
Age: 84
Setting detail: OUTPATIENT SURGERY
Discharge: HOME | End: 2019-07-29
Attending: INTERNAL MEDICINE | Admitting: INTERNAL MEDICINE
Payer: MEDICARE

## 2019-07-29 ENCOUNTER — ANESTHESIA EVENT (OUTPATIENT)
Dept: ENDOSCOPY | Age: 84
End: 2019-07-29
Payer: MEDICARE

## 2019-07-29 VITALS
BODY MASS INDEX: 26.52 KG/M2 | WEIGHT: 175 LBS | HEIGHT: 68 IN | HEART RATE: 70 BPM | DIASTOLIC BLOOD PRESSURE: 69 MMHG | TEMPERATURE: 97 F | OXYGEN SATURATION: 97 % | SYSTOLIC BLOOD PRESSURE: 140 MMHG | RESPIRATION RATE: 16 BRPM

## 2019-07-29 VITALS
RESPIRATION RATE: 16 BRPM | SYSTOLIC BLOOD PRESSURE: 110 MMHG | OXYGEN SATURATION: 98 % | DIASTOLIC BLOOD PRESSURE: 58 MMHG

## 2019-07-29 LAB
GLUCOSE BLD-MCNC: 96 MG/DL (ref 70–99)
PERFORMED ON: NORMAL

## 2019-07-29 PROCEDURE — 3700000001 HC ADD 15 MINUTES (ANESTHESIA): Performed by: INTERNAL MEDICINE

## 2019-07-29 PROCEDURE — 3700000000 HC ANESTHESIA ATTENDED CARE: Performed by: INTERNAL MEDICINE

## 2019-07-29 PROCEDURE — 7100000011 HC PHASE II RECOVERY - ADDTL 15 MIN: Performed by: INTERNAL MEDICINE

## 2019-07-29 PROCEDURE — 2709999900 HC NON-CHARGEABLE SUPPLY: Performed by: INTERNAL MEDICINE

## 2019-07-29 PROCEDURE — 2500000003 HC RX 250 WO HCPCS: Performed by: NURSE ANESTHETIST, CERTIFIED REGISTERED

## 2019-07-29 PROCEDURE — 2580000003 HC RX 258: Performed by: NURSE ANESTHETIST, CERTIFIED REGISTERED

## 2019-07-29 PROCEDURE — 2580000003 HC RX 258: Performed by: ANESTHESIOLOGY

## 2019-07-29 PROCEDURE — 7100000010 HC PHASE II RECOVERY - FIRST 15 MIN: Performed by: INTERNAL MEDICINE

## 2019-07-29 PROCEDURE — 3609027000 HC COLONOSCOPY: Performed by: INTERNAL MEDICINE

## 2019-07-29 PROCEDURE — 6360000002 HC RX W HCPCS: Performed by: NURSE ANESTHETIST, CERTIFIED REGISTERED

## 2019-07-29 RX ORDER — PROPOFOL 10 MG/ML
INJECTION, EMULSION INTRAVENOUS PRN
Status: DISCONTINUED | OUTPATIENT
Start: 2019-07-29 | End: 2019-07-29 | Stop reason: SDUPTHER

## 2019-07-29 RX ORDER — LIDOCAINE HYDROCHLORIDE 10 MG/ML
0.3 INJECTION, SOLUTION INFILTRATION; PERINEURAL
Status: DISCONTINUED | OUTPATIENT
Start: 2019-07-29 | End: 2019-07-29 | Stop reason: HOSPADM

## 2019-07-29 RX ORDER — ACETAMINOPHEN 325 MG/1
650 TABLET ORAL EVERY 4 HOURS PRN
Status: ON HOLD | COMMUNITY
End: 2020-08-17 | Stop reason: HOSPADM

## 2019-07-29 RX ORDER — GLIPIZIDE 5 MG/1
5 TABLET ORAL
Status: ON HOLD | COMMUNITY
End: 2020-08-17 | Stop reason: HOSPADM

## 2019-07-29 RX ORDER — SODIUM CHLORIDE, SODIUM LACTATE, POTASSIUM CHLORIDE, CALCIUM CHLORIDE 600; 310; 30; 20 MG/100ML; MG/100ML; MG/100ML; MG/100ML
INJECTION, SOLUTION INTRAVENOUS CONTINUOUS
Status: DISCONTINUED | OUTPATIENT
Start: 2019-07-29 | End: 2019-07-29 | Stop reason: HOSPADM

## 2019-07-29 RX ORDER — TRAMADOL HYDROCHLORIDE 50 MG/1
50 TABLET ORAL EVERY 8 HOURS PRN
COMMUNITY
End: 2019-08-19 | Stop reason: ALTCHOICE

## 2019-07-29 RX ORDER — SODIUM CHLORIDE 0.9 % (FLUSH) 0.9 %
10 SYRINGE (ML) INJECTION EVERY 12 HOURS SCHEDULED
Status: DISCONTINUED | OUTPATIENT
Start: 2019-07-29 | End: 2019-07-29 | Stop reason: HOSPADM

## 2019-07-29 RX ORDER — LIDOCAINE HYDROCHLORIDE 20 MG/ML
INJECTION, SOLUTION INFILTRATION; PERINEURAL PRN
Status: DISCONTINUED | OUTPATIENT
Start: 2019-07-29 | End: 2019-07-29 | Stop reason: SDUPTHER

## 2019-07-29 RX ORDER — SODIUM CHLORIDE, SODIUM LACTATE, POTASSIUM CHLORIDE, CALCIUM CHLORIDE 600; 310; 30; 20 MG/100ML; MG/100ML; MG/100ML; MG/100ML
INJECTION, SOLUTION INTRAVENOUS CONTINUOUS PRN
Status: DISCONTINUED | OUTPATIENT
Start: 2019-07-29 | End: 2019-07-29 | Stop reason: SDUPTHER

## 2019-07-29 RX ORDER — FUROSEMIDE 20 MG/1
20 TABLET ORAL DAILY
Status: ON HOLD | COMMUNITY
End: 2020-08-17 | Stop reason: HOSPADM

## 2019-07-29 RX ORDER — SODIUM CHLORIDE 0.9 % (FLUSH) 0.9 %
10 SYRINGE (ML) INJECTION PRN
Status: DISCONTINUED | OUTPATIENT
Start: 2019-07-29 | End: 2019-07-29 | Stop reason: HOSPADM

## 2019-07-29 RX ADMIN — SODIUM CHLORIDE, POTASSIUM CHLORIDE, SODIUM LACTATE AND CALCIUM CHLORIDE: 600; 310; 30; 20 INJECTION, SOLUTION INTRAVENOUS at 07:41

## 2019-07-29 RX ADMIN — PROPOFOL 120 MG: 10 INJECTION, EMULSION INTRAVENOUS at 08:26

## 2019-07-29 RX ADMIN — LIDOCAINE HYDROCHLORIDE 50 MG: 20 INJECTION, SOLUTION INFILTRATION; PERINEURAL at 08:26

## 2019-07-29 RX ADMIN — SODIUM CHLORIDE, POTASSIUM CHLORIDE, SODIUM LACTATE AND CALCIUM CHLORIDE: 600; 310; 30; 20 INJECTION, SOLUTION INTRAVENOUS at 08:21

## 2019-07-29 ASSESSMENT — PAIN - FUNCTIONAL ASSESSMENT: PAIN_FUNCTIONAL_ASSESSMENT: 0-10

## 2019-07-29 NOTE — ANESTHESIA PRE PROCEDURE
Endo/Other:    (+) Diabetes, . Abdominal:           Vascular:                                        Anesthesia Plan      MAC     ASA 3       Induction: intravenous. Anesthetic plan and risks discussed with patient. Plan discussed with CRNA.                   Shirley Gutierrez MD   7/29/2019

## 2019-07-29 NOTE — ANESTHESIA POSTPROCEDURE EVALUATION
Department of Anesthesiology  Postprocedure Note    Patient: Theo Caceres  MRN: [id number]  YOB: 1934  Date of evaluation: 7/29/2019  Time:  3:16 PM     Procedure Summary     Date:  07/29/19 Room / Location:  [place] [address] / [facility] END[facility]COPY    Anesthesia Start:  9036 Anesthesia Stop:  6870    Procedure:  COLON W/ANES. (8:00) (DAUGHTER,JANIA, IS POA, WILL BE HERE FOR PROCEDURE) (N/A ) Diagnosis:  (OCCULT BLOOD IN STOOL)    Surgeon:  Magaly Weaver DO Responsible Provider:  Lia Reyes MD    Anesthesia Type:  MAC ASA Status:  3          Anesthesia Type: MAC    Claire Phase I: Claire Score: 10    Claire Phase II: Claire Score: 10    Last vitals: Reviewed and per EMR flowsheets.        Anesthesia Post Evaluation    Comments: Postoperative Anesthesia Note    Name:    Theo Caceres  MRN:      [id number]    Patient Vitals in the past 12 hrs:  07/29/19 0911, BP:(!) 140/69, Pulse:70, Resp:16, SpO2:97 %  07/29/19 0855, BP:115/75, Pulse:70, Resp:16, SpO2:96 %  07/29/19 0850, BP:(!) 148/75, Pulse:70, Resp:16, SpO2:96 %  07/29/19 0845, BP:124/67, Pulse:70, Resp:16, SpO2:96 %  07/29/19 0839, BP:111/62, Temp:97 °F (36.1 °C), Temp src:Temporal, Pulse:70, Resp:16, SpO2:100 %  07/29/19 0728, BP:(!) 153/70, Temp:97.5 °F (36.4 °C), Temp src:Temporal, Pulse:71, Resp:20, SpO2:98 %, Height:5' 8\" (1.727 m), Weight:175 lb (79.4 kg)     LABS:    CBC  Lab Results       Component                Value               Date/Time                  WBC                      11.0                04/17/2019 11:39 AM        HGB                      10.4 (L)            04/17/2019 11:39 AM        HCT                      33.0 (L)            04/17/2019 11:39 AM        PLT                      353                 04/17/2019 11:39 AM   RENAL  Lab Results       Component                Value               Date/Time                  NA                       142                 04/17/2019 11:39 AM        K

## 2019-08-19 ENCOUNTER — HOSPITAL ENCOUNTER (OUTPATIENT)
Dept: WOUND CARE | Age: 84
Discharge: HOME | End: 2019-08-19
Payer: MEDICARE

## 2019-08-19 VITALS
HEART RATE: 84 BPM | BODY MASS INDEX: 25.88 KG/M2 | SYSTOLIC BLOOD PRESSURE: 128 MMHG | DIASTOLIC BLOOD PRESSURE: 64 MMHG | HEIGHT: 68 IN | WEIGHT: 170.8 LBS | RESPIRATION RATE: 18 BRPM | TEMPERATURE: 97.7 F

## 2019-08-19 PROCEDURE — 99213 OFFICE O/P EST LOW 20 MIN: CPT

## 2019-08-19 PROCEDURE — 11042 DBRDMT SUBQ TIS 1ST 20SQCM/<: CPT

## 2019-08-19 PROCEDURE — 11044 DBRDMT BONE 1ST 20 SQ CM/<: CPT

## 2019-08-19 RX ORDER — LIDOCAINE 40 MG/G
CREAM TOPICAL PRN
Status: DISCONTINUED | OUTPATIENT
Start: 2019-08-19 | End: 2019-08-20 | Stop reason: HOSPADM

## 2019-08-19 ASSESSMENT — PAIN SCALES - GENERAL: PAINLEVEL_OUTOF10: 0

## 2019-08-19 NOTE — PROGRESS NOTES
[facility] Progress Note      Edward Lund     : 1934    DATE OF VISIT:  2019    Subjective:     Edward Lund is a 80 y.o. male who has a chief complaint of a diabetic ulcer located on the bilateral foot. Significant symptoms or pertinent ulcer history since last visit: States that left foot wound has been present for about a year. Size has been getting better. Right ankle wound present for a couple months. Did undergo angioplasty with Dr. Dylan Gonzalez recently with right leg being done last week. Current complaint of pain in this ulcer? No.        Mr. Cam Levy has a past medical history of AAA (abdominal aortic aneurysm) ([address] Acute constipation, Acute gastrointestinal hemorrhage, Anemia, Bladder CA in situ, BPH (benign prostatic hyperplasia), CHF (congestive heart failure) ([address] Constipation, COPD (chronic obstructive pulmonary disease) ([address] Coronary arteriosclerosis, Current mild episode of major depressive disorder ([address] Depression, Diabetes mellitus ([address] Diabetic neuropathy ([address] Diverticulitis of intestine w/o perforation or abscess with bleeding, GERD (gastroesophageal reflux disease), GI hemorrhage, Hypercholesteremia, Hypertension, Hypoaldosteronism ([address] Pacemaker, Postsurgical aortocoronary bypass status, Pure hypercholesterolemia, S/P cataract surgery, Ulcer of left foot ([address] and Vitamin D deficiency. He has a past surgical history that includes eye surgery; Pacemaker insertion; Colonoscopy (2019); and Colonoscopy (N/A, 2019). His family history includes Cancer in his brother, sister, sister, and sister; Heart Attack in his father and mother; Tuberculosis in his mother. Mr. Cam Levy reports that he has quit smoking. He has never used smokeless tobacco. He reports that he does not drink alcohol or use drugs.     His current medication list consists of Alogliptin Benzoate, DULoxetine, Protein, acetaminophen, albuterol, aspirin, atorvastatin, cilostazol, ferrous sulfate, finasteride, fluticasone-salmeterol, furosemide, gabapentin, glipiZIDE, insulin glargine, insulin lispro, menthol-cetylpyridinium, metoprolol succinate, nitroGLYCERIN, omeprazole, polyethylene glycol, and vitamin D. Allergies: No known allergies    Pertinent items from the review of systems are discussed in the HPI; the remainder of the ROS was reviewed and is negative. Denies nausea, vomiting, fevers, chills, shortness of breath or chest pain. Objective:     /64   Pulse 84   Temp 97.7 °F (36.5 °C) (Oral)   Resp 18   Ht 5' 8\" (1.727 m)   Wt 170 lb 12.8 oz (77.5 kg)   BMI 25.97 kg/m²      General Appearance: alert and oriented to person, place and time, well developed and well- nourished, in no acute distress  Skin: warm and dry, no rash or erythema  Head: normocephalic and atraumatic  Eyes: pupils equal, round, and reactive to light, extraocular eye movements intact, conjunctivae normal  ENT: tympanic membrane, external ear and ear canal normal bilaterally, nose without deformity, nasal mucosa and turbinates normal without polyps  Neck: supple and non-tender without mass, no thyromegaly or thyroid nodules, no cervical lymphadenopathy  Pulmonary/Chest: clear to auscultation bilaterally- no wheezes, rales or rhonchi, normal air movement, no respiratory distress  Cardiovascular: normal rate, regular rhythm, normal S1 and S2, no murmurs, rubs, clicks, or gallops, distal pulses intact, no carotid bruits  Abdomen: soft, non-tender, non-distended, normal bowel sounds, no masses or organomegaly.     Dorsalis pedis pulse left dopplerable  Posterior tibial pulse left dopplerable  Dorsalis pedis pulse right dopplerable  Posterior tibial pulse right dopplerable  Protective sensation absent bilateral LE      Ulcer on the right lateral ankle with moderate fibrotic tissue, red granulation tissue, mild serous drainage, no hyperkeratotic rim, no undermining, no

## 2019-08-20 RX ORDER — SODIUM CHLORIDE, SODIUM LACTATE, POTASSIUM CHLORIDE, CALCIUM CHLORIDE 600; 310; 30; 20 MG/100ML; MG/100ML; MG/100ML; MG/100ML
INJECTION, SOLUTION INTRAVENOUS CONTINUOUS
Status: CANCELLED | OUTPATIENT
Start: 2019-08-20

## 2019-08-21 ENCOUNTER — ANESTHESIA EVENT (OUTPATIENT)
Dept: ENDOSCOPY | Age: 84
End: 2019-08-21
Payer: MEDICARE

## 2019-08-21 NOTE — ANESTHESIA PRE PROCEDURE
(LIPITOR) 40 MG tablet atorvastatin 40 mg tablet   aspirin 81 MG chewable tablet aspirin 81 mg chewable tablet   omeprazole (PRILOSEC) 40 MG  omeprazole 40 mg capsule,delayed release   finasteride (PROSCAR) 5 MG tablet finasteride 5 mg tablet   albuterol (PROVENTIL) nebulizer solution albuterol sulfate 2.5 mg/3 mL (0.083 %) solution for nebulization     Allergies:     No Known Allergies      Problem List:     HCAP (healthcare-associated pneumonia) J18.9    Acute hypoxemic respiratory failure ([facility]) J96.01    MARCOS (acute kidney injury) ([facility]) N17.9    COPD exacerbation ([facility]) J44.1    Hypertension I10    Hypercholesteremia E78.00    GERD (gastroesophageal reflux disease) K21.9    Diabetes mellitus ([address] E11.9    Pneumonia due to organism J18.9    Leukocytosis D72.829    Pleural effusion J90     Past Medical History:    AAA (abdominal aortic aneurysm) ([facility])     Acute constipation     Acute gastrointestinal hemorrhage     Anemia     Bladder CA in situ     BPH (benign prostatic hyperplasia)     CHF (congestive heart failure) ([facility])     Constipation     COPD (chronic obstructive pulmonary disease) ([facility])     Coronary arteriosclerosis     Current mild episode of major depressive disorder ([facility])     Depression     Diabetes mellitus (HCC)     Diabetic neuropathy ([facility])     Diverticulitis of intestine w/o perforation or abscess with bleeding     GERD (gastroesophageal reflux disease)     GI hemorrhage     Hypercholesteremia     Hypertension     Hypoaldosteronism ([address]     Pacemaker     Postsurgical aortocoronary bypass status     Pure hypercholesterolemia     S/P cataract surgery     Ulcer of left foot [facility]     Vitamin D deficiency      Past Surgical History:     COLONOSCOPY  07/29/2019    Incomplete colonoscopy; Poor prep    COLONOSCOPY N/A 7/29/2019    COLON W/ANES. (8:00) (DAUGHTER,JANIA, IS POA, WILL BE HERE FOR PROCEDURE) performed by DO ministerio Gund[facility] and healthcare power of . Plan discussed with CRNA.             Sarah Cano MD

## 2019-08-22 ENCOUNTER — ANESTHESIA (OUTPATIENT)
Dept: ENDOSCOPY | Age: 84
End: 2019-08-22
Payer: MEDICARE

## 2019-08-22 ENCOUNTER — HOSPITAL ENCOUNTER (OUTPATIENT)
Age: 84
Setting detail: OUTPATIENT SURGERY
Discharge: NURSING, REHAB OR LONG-TERM CARE | End: 2019-08-22
Attending: INTERNAL MEDICINE | Admitting: INTERNAL MEDICINE
Payer: MEDICARE

## 2019-08-22 VITALS
HEART RATE: 71 BPM | WEIGHT: 175 LBS | SYSTOLIC BLOOD PRESSURE: 165 MMHG | TEMPERATURE: 96.3 F | DIASTOLIC BLOOD PRESSURE: 76 MMHG | OXYGEN SATURATION: 97 % | BODY MASS INDEX: 26.52 KG/M2 | HEIGHT: 68 IN | RESPIRATION RATE: 16 BRPM

## 2019-08-22 VITALS
SYSTOLIC BLOOD PRESSURE: 140 MMHG | RESPIRATION RATE: 13 BRPM | DIASTOLIC BLOOD PRESSURE: 76 MMHG | OXYGEN SATURATION: 100 %

## 2019-08-22 LAB
GLUCOSE BLD-MCNC: 118 MG/DL (ref 70–99)
PERFORMED ON: ABNORMAL

## 2019-08-22 PROCEDURE — 2709999900 HC NON-CHARGEABLE SUPPLY: Performed by: INTERNAL MEDICINE

## 2019-08-22 PROCEDURE — 3700000000 HC ANESTHESIA ATTENDED CARE: Performed by: INTERNAL MEDICINE

## 2019-08-22 PROCEDURE — 3609010600 HC COLONOSCOPY POLYPECTOMY SNARE/COLD BIOPSY: Performed by: INTERNAL MEDICINE

## 2019-08-22 PROCEDURE — 6360000002 HC RX W HCPCS: Performed by: NURSE ANESTHETIST, CERTIFIED REGISTERED

## 2019-08-22 PROCEDURE — 3700000001 HC ADD 15 MINUTES (ANESTHESIA): Performed by: INTERNAL MEDICINE

## 2019-08-22 PROCEDURE — 2500000003 HC RX 250 WO HCPCS: Performed by: NURSE ANESTHETIST, CERTIFIED REGISTERED

## 2019-08-22 PROCEDURE — 88305 TISSUE EXAM BY PATHOLOGIST: CPT

## 2019-08-22 PROCEDURE — 7100000011 HC PHASE II RECOVERY - ADDTL 15 MIN: Performed by: INTERNAL MEDICINE

## 2019-08-22 PROCEDURE — 2580000003 HC RX 258: Performed by: ANESTHESIOLOGY

## 2019-08-22 PROCEDURE — 7100000010 HC PHASE II RECOVERY - FIRST 15 MIN: Performed by: INTERNAL MEDICINE

## 2019-08-22 RX ORDER — PROPOFOL 10 MG/ML
INJECTION, EMULSION INTRAVENOUS PRN
Status: DISCONTINUED | OUTPATIENT
Start: 2019-08-22 | End: 2019-08-22 | Stop reason: SDUPTHER

## 2019-08-22 RX ORDER — SODIUM CHLORIDE, SODIUM LACTATE, POTASSIUM CHLORIDE, CALCIUM CHLORIDE 600; 310; 30; 20 MG/100ML; MG/100ML; MG/100ML; MG/100ML
INJECTION, SOLUTION INTRAVENOUS CONTINUOUS PRN
Status: DISCONTINUED | OUTPATIENT
Start: 2019-08-22 | End: 2019-08-22 | Stop reason: SDUPTHER

## 2019-08-22 RX ADMIN — PROPOFOL 100 MG: 10 INJECTION, EMULSION INTRAVENOUS at 10:44

## 2019-08-22 RX ADMIN — GLUCAGON HYDROCHLORIDE 0.5 MG: 1 INJECTION, POWDER, FOR SOLUTION INTRAMUSCULAR; INTRAVENOUS; SUBCUTANEOUS at 11:05

## 2019-08-22 RX ADMIN — SODIUM CHLORIDE, POTASSIUM CHLORIDE, SODIUM LACTATE AND CALCIUM CHLORIDE: 600; 310; 30; 20 INJECTION, SOLUTION INTRAVENOUS at 10:00

## 2019-08-22 ASSESSMENT — PAIN - FUNCTIONAL ASSESSMENT: PAIN_FUNCTIONAL_ASSESSMENT: 0-10

## 2019-08-22 ASSESSMENT — LIFESTYLE VARIABLES: SMOKING_STATUS: 0

## 2019-08-26 ENCOUNTER — HOSPITAL ENCOUNTER (OUTPATIENT)
Dept: WOUND CARE | Age: 84
Discharge: HOME | End: 2019-08-26
Payer: MEDICARE

## 2019-08-26 VITALS
HEART RATE: 76 BPM | WEIGHT: 170.2 LBS | RESPIRATION RATE: 18 BRPM | SYSTOLIC BLOOD PRESSURE: 138 MMHG | TEMPERATURE: 97.8 F | HEIGHT: 68 IN | DIASTOLIC BLOOD PRESSURE: 64 MMHG | BODY MASS INDEX: 25.79 KG/M2

## 2019-08-26 PROCEDURE — 11042 DBRDMT SUBQ TIS 1ST 20SQCM/<: CPT

## 2019-08-26 RX ORDER — LIDOCAINE 40 MG/G
CREAM TOPICAL ONCE
Status: DISCONTINUED | OUTPATIENT
Start: 2019-08-26 | End: 2019-08-27 | Stop reason: HOSPADM

## 2019-08-26 ASSESSMENT — PAIN DESCRIPTION - ORIENTATION: ORIENTATION: RIGHT

## 2019-08-26 ASSESSMENT — PAIN DESCRIPTION - ONSET: ONSET: ON-GOING

## 2019-08-26 ASSESSMENT — PAIN DESCRIPTION - PAIN TYPE: TYPE: ACUTE PAIN

## 2019-08-26 ASSESSMENT — PAIN DESCRIPTION - PROGRESSION: CLINICAL_PROGRESSION: NOT CHANGED

## 2019-08-26 ASSESSMENT — PAIN SCALES - GENERAL
PAINLEVEL_OUTOF10: 5
PAINLEVEL_OUTOF10: 0

## 2019-08-26 ASSESSMENT — PAIN DESCRIPTION - DESCRIPTORS: DESCRIPTORS: THROBBING

## 2019-08-26 ASSESSMENT — PAIN - FUNCTIONAL ASSESSMENT: PAIN_FUNCTIONAL_ASSESSMENT: ACTIVITIES ARE NOT PREVENTED

## 2019-08-26 ASSESSMENT — PAIN DESCRIPTION - FREQUENCY: FREQUENCY: INTERMITTENT

## 2019-08-26 ASSESSMENT — PAIN DESCRIPTION - LOCATION: LOCATION: FOOT

## 2019-08-26 NOTE — PROGRESS NOTES
tissue, mild serous drainage, no hyperkeratotic rim, no undermining, no tunneling, no purulence, no malodor, no eschar, no periwound maceration, mild periwound erythema, no edema, no crepitus, no increase in skin temperature, ulcer probes to soft tissue only    Ulcer on the dorsal aspect left HIPJ with mild fibrotic tissue, red granulation tissue, mild serous drainage, no hyperkeratotic rim, no undermining, no tunneling, no purulence, no malodor, no eschar,  no periwound maceration, mild periwound erythema, mild edema, no crepitus, no increase in skin temperature, ulcer probes to soft tissue only      Pre-debridement ulcer measurements are in the wound documentation flowsheet.     Post  Debridement Measurements:  Today's wound measurements:  Wound 08/19/19 # 1, Right Lateral Ankle, Diabetic Foot Ulcer, Bang 1, (onset July 2019)-Wound Length (cm): 1 cm  Wound 08/19/19 # 2, Left Great Toe, Diabetic Foot Ulcer, Bang 2, (onset July 2019)-Wound Length (cm): 0.1 cm    Wound 08/19/19 # 1, Right Lateral Ankle, Diabetic Foot Ulcer, Bang 1, (onset July 2019)-Wound Width (cm): 1 cm  Wound 08/19/19 # 2, Left Great Toe, Diabetic Foot Ulcer, Bang 2, (onset July 2019)-Wound Width (cm): 0.3 cm    Wound 08/19/19 # 1, Right Lateral Ankle, Diabetic Foot Ulcer, Bang 1, (onset July 2019)-Wound Depth (cm): 0.2 cm  Wound 08/19/19 # 2, Left Great Toe, Diabetic Foot Ulcer, Bang 2, (onset July 2019)-Wound Depth (cm): 0.1 cm    Pain Control: Anesthetic  Anesthetic: 4% Lidocaine Liquid Topical         LABS  Lab Results   Component Value Date    LABA1C 10.7 04/10/2019         Assessment:     Patient Active Problem List   Diagnosis Code    HCAP (healthcare-associated pneumonia) J18.9    Acute hypoxemic respiratory failure ([facility] J96.01    MARCOS (acute kidney injury) ([facility] N17.9    [facility] J44.1    Hypertension I10    Hypercholesteremia E78.00    GERD (gastroesophageal reflux disease) K21.9    Diabetes mellitus

## 2019-08-26 NOTE — PLAN OF CARE
reviewed with patient, all questions answered, copy given to patient. Dressings were applied to all wounds per M.D. Instructions at this visit.

## 2019-09-09 ENCOUNTER — HOSPITAL ENCOUNTER (OUTPATIENT)
Dept: WOUND CARE | Age: 84
Discharge: HOME | End: 2019-09-09
Payer: MEDICARE

## 2019-09-09 VITALS
SYSTOLIC BLOOD PRESSURE: 127 MMHG | HEIGHT: 68 IN | HEART RATE: 83 BPM | WEIGHT: 168 LBS | RESPIRATION RATE: 18 BRPM | BODY MASS INDEX: 25.46 KG/M2 | TEMPERATURE: 98 F | DIASTOLIC BLOOD PRESSURE: 64 MMHG

## 2019-09-09 PROCEDURE — 11042 DBRDMT SUBQ TIS 1ST 20SQCM/<: CPT

## 2019-09-09 RX ORDER — LIDOCAINE 40 MG/G
CREAM TOPICAL ONCE
Status: DISCONTINUED | OUTPATIENT
Start: 2019-09-09 | End: 2019-09-10 | Stop reason: HOSPADM

## 2019-09-09 ASSESSMENT — PAIN DESCRIPTION - ONSET: ONSET: ON-GOING

## 2019-09-09 ASSESSMENT — PAIN DESCRIPTION - PAIN TYPE: TYPE: ACUTE PAIN

## 2019-09-09 ASSESSMENT — PAIN SCALES - GENERAL: PAINLEVEL_OUTOF10: 8

## 2019-09-09 ASSESSMENT — PAIN DESCRIPTION - PROGRESSION: CLINICAL_PROGRESSION: NOT CHANGED

## 2019-09-09 ASSESSMENT — PAIN DESCRIPTION - DESCRIPTORS: DESCRIPTORS: THROBBING

## 2019-09-09 ASSESSMENT — PAIN DESCRIPTION - ORIENTATION: ORIENTATION: RIGHT

## 2019-09-09 ASSESSMENT — PAIN DESCRIPTION - FREQUENCY: FREQUENCY: INTERMITTENT

## 2019-09-09 ASSESSMENT — PAIN - FUNCTIONAL ASSESSMENT: PAIN_FUNCTIONAL_ASSESSMENT: ACTIVITIES ARE NOT PREVENTED

## 2019-09-09 ASSESSMENT — PAIN DESCRIPTION - LOCATION: LOCATION: FOOT

## 2019-09-09 NOTE — PROGRESS NOTES
DULoxetine, Protein, acetaminophen, albuterol, aspirin, atorvastatin, cilostazol, ferrous sulfate, finasteride, fluticasone-salmeterol, furosemide, gabapentin, glipiZIDE, insulin glargine, insulin lispro, menthol-cetylpyridinium, metoprolol succinate, nitroGLYCERIN, omeprazole, polyethylene glycol, and vitamin D. Allergies: No known allergies    Pertinent items from the review of systems are discussed in the HPI; the remainder of the ROS was reviewed and is negative. Denies nausea, vomiting, fevers, chills, shortness of breath or chest pain. Objective:     /64   Pulse 83   Temp 98 °F (36.7 °C) (Oral)   Resp 18   Ht 5' 8\" (1.727 m)   Wt 168 lb (76.2 kg)   BMI 25.54 kg/m²      General Appearance: alert and oriented to person, place and time, well developed and well- nourished, in no acute distress  Skin: warm and dry, no rash or erythema  Head: normocephalic and atraumatic  Eyes: pupils equal, round, and reactive to light, extraocular eye movements intact, conjunctivae normal  ENT: tympanic membrane, external ear and ear canal normal bilaterally, nose without deformity, nasal mucosa and turbinates normal without polyps  Neck: supple and non-tender without mass, no thyromegaly or thyroid nodules, no cervical lymphadenopathy  Pulmonary/Chest: clear to auscultation bilaterally- no wheezes, rales or rhonchi, normal air movement, no respiratory distress  Cardiovascular: normal rate, regular rhythm, normal S1 and S2, no murmurs, rubs, clicks, or gallops, distal pulses intact, no carotid bruits  Abdomen: soft, non-tender, non-distended, normal bowel sounds, no masses or organomegaly.     Dorsalis pedis pulse left dopplerable  Posterior tibial pulse left dopplerable  Dorsalis pedis pulse right dopplerable  Posterior tibial pulse right dopplerable  Protective sensation absent bilateral LE      Ulcer on the right lateral ankle with mild to moderate fibrotic tissue, red granulation tissue, mild serous

## 2019-09-16 ENCOUNTER — HOSPITAL ENCOUNTER (OUTPATIENT)
Dept: WOUND CARE | Age: 84
Discharge: HOME | End: 2019-09-16
Payer: MEDICARE

## 2019-09-16 ENCOUNTER — HOSPITAL ENCOUNTER (OUTPATIENT)
Dept: GENERAL RADIOLOGY | Age: 84
Discharge: HOME | End: 2019-09-16
Payer: MEDICARE

## 2019-09-16 VITALS
RESPIRATION RATE: 18 BRPM | WEIGHT: 163.6 LBS | BODY MASS INDEX: 24.8 KG/M2 | HEIGHT: 68 IN | HEART RATE: 68 BPM | SYSTOLIC BLOOD PRESSURE: 152 MMHG | TEMPERATURE: 98.3 F | DIASTOLIC BLOOD PRESSURE: 68 MMHG

## 2019-09-16 DIAGNOSIS — L97.524 ULCER OF TOE OF LEFT FOOT, WITH NECROSIS OF BONE (HCC): Primary | ICD-10-CM

## 2019-09-16 DIAGNOSIS — L97.524 ULCER OF TOE OF LEFT FOOT, WITH NECROSIS OF BONE (HCC): ICD-10-CM

## 2019-09-16 PROCEDURE — 11042 DBRDMT SUBQ TIS 1ST 20SQCM/<: CPT

## 2019-09-16 PROCEDURE — 73630 X-RAY EXAM OF FOOT: CPT

## 2019-09-16 RX ORDER — CIPROFLOXACIN 500 MG/1
500 TABLET, FILM COATED ORAL 2 TIMES DAILY
Qty: 28 TABLET | Refills: 0 | Status: SHIPPED | OUTPATIENT
Start: 2019-09-16 | End: 2019-09-30

## 2019-09-16 RX ORDER — CEPHALEXIN 500 MG/1
500 CAPSULE ORAL 2 TIMES DAILY
COMMUNITY
Start: 2019-09-13 | End: 2019-09-20

## 2019-09-16 RX ORDER — LIDOCAINE 40 MG/G
CREAM TOPICAL PRN
Status: DISCONTINUED | OUTPATIENT
Start: 2019-09-16 | End: 2019-09-17 | Stop reason: HOSPADM

## 2019-09-16 RX ORDER — L.RHAMNOSUS/B.ANIMALIS(LACTIS) 3B CELL
1 CAPSULE ORAL 3 TIMES DAILY
COMMUNITY
Start: 2019-09-13 | End: 2019-09-20

## 2019-09-16 ASSESSMENT — PAIN DESCRIPTION - PROGRESSION: CLINICAL_PROGRESSION: NOT CHANGED

## 2019-09-16 ASSESSMENT — PAIN DESCRIPTION - PAIN TYPE: TYPE: ACUTE PAIN

## 2019-09-16 ASSESSMENT — PAIN DESCRIPTION - ORIENTATION: ORIENTATION: RIGHT

## 2019-09-16 ASSESSMENT — PAIN DESCRIPTION - LOCATION: LOCATION: TOE (COMMENT WHICH ONE)

## 2019-09-16 ASSESSMENT — PAIN SCALES - GENERAL
PAINLEVEL_OUTOF10: 8
PAINLEVEL_OUTOF10: 6

## 2019-09-16 ASSESSMENT — PAIN DESCRIPTION - DIRECTION: RADIATING_TOWARDS: DENIES

## 2019-09-16 ASSESSMENT — PAIN - FUNCTIONAL ASSESSMENT: PAIN_FUNCTIONAL_ASSESSMENT: PREVENTS OR INTERFERES WITH MANY ACTIVE NOT PASSIVE ACTIVITIES

## 2019-09-16 ASSESSMENT — PAIN DESCRIPTION - DESCRIPTORS: DESCRIPTORS: ACHING;SHARP

## 2019-09-16 ASSESSMENT — PAIN DESCRIPTION - FREQUENCY: FREQUENCY: CONTINUOUS

## 2019-09-16 ASSESSMENT — PAIN DESCRIPTION - ONSET: ONSET: ON-GOING

## 2019-09-16 NOTE — PROGRESS NOTES
(gastroesophageal reflux disease) K21.9    Diabetes mellitus ([address] E11.9    Pneumonia due to organism J18.9    Leukocytosis D72.829    Pleural effusion J90       Assessment of today's active condition(s): diabetic foot ulcer right lateral ankle and dorsal left HIPJ, PAD, diabetes mellitus. Factors contributing to occurrence and/or persistence of the chronic ulcer include diabetes and arterial insufficiency. Sharp debridement is indicated today, based upon the exam findings in the ulcer(s) above. Procedure note:     Consent obtained. Time out taken. Anesthetic  Anesthetic: 4% Lidocaine Liquid Topical     After application of topical 4% lidocaine plain to the ulcers, the ulcers were debrided of all fibrotic, necrotic, hyperkeratotic tissue, nonviable and viable tissue through the subcutaneous tissue. This excised skin and subcutaneous tissue with a scalpel. Total Surface Area Debrided:  1 sq cm. The ulcer(s) were then irrigated with normal saline solution. The procedure was completed with a small amount of bleeding, and hemostasis was by pressure. The patient tolerated the procedure well, with no significant complications. The patient's level of pain during and after the procedure was monitored, and is noted in the wound documentation flowsheet. Discharge plan:     Treatment in the wound care center today: Wound 08/19/19 # 2, Left Great Toe, Diabetic Foot Ulcer, Bang 2, (onset July 2019)-Dressing/Treatment: Other (comment)  Wound 08/19/19 # 1, Right Lateral Ankle, Diabetic Foot Ulcer, Bang 1, (onset July 2019)-Dressing/Treatment: Other (comment)(Opticel Ag, dry dressing). Home treatment: good handwashing before and after any dressing changes. Cleanse ulcer with saline or soap & water before dressing change. May use Vaseline (petrolatum), Aquaphor, Aveeno, CeraVe, Cetaphil, Eucerin, Lubriderm, etc for dry skin. Dressing type for home: Silvadene and dry dressing to wounds daily. Written discharge instructions given to patient. Offload ulcer(s) as directed. Elevate leg(s) as directed. Follow up in 2 weeks. Rx Cipro 500mg one tab PO BID. Discussed potential need for surgical intervention depending on clinical progression and xray findings. Ordered xray right foot to evaluate for osteomyelitis. Continue to follow up with Dr. Tyrell Durant as per them.            Electronically signed by Ken Lopez DPM on 9/16/2019 at 5:08 PM.

## 2019-09-16 NOTE — PLAN OF CARE
Pt to the [facility] for follow up appointment. Pt with increased redness in left great toe. Left great toe is not warm to touch and no odor noted. Pt to have Xray of left foot today and to start on Cipro oral antibiotics. Pt to continue with silvadene to wounds. Pt to follow up in the [facility] in 2 weeks. Discharge instructions reviewed with patient, all questions answered, copy given to patient. Dressings were applied to all wounds per M.D. Instructions at this visit.

## 2019-09-23 ENCOUNTER — APPOINTMENT (OUTPATIENT)
Dept: CT IMAGING | Age: 84
End: 2019-09-23
Payer: MEDICARE

## 2019-09-23 ENCOUNTER — HOSPITAL ENCOUNTER (EMERGENCY)
Age: 84
Discharge: NURSING, REHAB OR LONG-TERM CARE | End: 2019-09-23
Attending: EMERGENCY MEDICINE
Payer: MEDICARE

## 2019-09-23 VITALS
OXYGEN SATURATION: 99 % | SYSTOLIC BLOOD PRESSURE: 124 MMHG | RESPIRATION RATE: 16 BRPM | DIASTOLIC BLOOD PRESSURE: 72 MMHG | BODY MASS INDEX: 24.71 KG/M2 | HEART RATE: 74 BPM | HEIGHT: 68 IN | WEIGHT: 163 LBS | TEMPERATURE: 97.9 F

## 2019-09-23 DIAGNOSIS — N30.00 ACUTE CYSTITIS WITHOUT HEMATURIA: Primary | ICD-10-CM

## 2019-09-23 LAB
A/G RATIO: 0.9 (ref 1.1–2.2)
ALBUMIN SERPL-MCNC: 3.8 G/DL (ref 3.4–5)
ALP BLD-CCNC: 91 U/L (ref 40–129)
ALT SERPL-CCNC: 10 U/L (ref 10–40)
ANION GAP SERPL CALCULATED.3IONS-SCNC: 14 MMOL/L (ref 3–16)
AST SERPL-CCNC: 12 U/L (ref 15–37)
BASOPHILS ABSOLUTE: 0.1 K/UL (ref 0–0.2)
BASOPHILS RELATIVE PERCENT: 0.5 %
BILIRUB SERPL-MCNC: 0.5 MG/DL (ref 0–1)
BILIRUBIN URINE: NEGATIVE
BLOOD, URINE: ABNORMAL
BUN BLDV-MCNC: 34 MG/DL (ref 7–20)
CALCIUM SERPL-MCNC: 9.7 MG/DL (ref 8.3–10.6)
CHLORIDE BLD-SCNC: 101 MMOL/L (ref 99–110)
CLARITY: ABNORMAL
CO2: 26 MMOL/L (ref 21–32)
COLOR: YELLOW
CREAT SERPL-MCNC: 1.5 MG/DL (ref 0.8–1.3)
EOSINOPHILS ABSOLUTE: 0.2 K/UL (ref 0–0.6)
EOSINOPHILS RELATIVE PERCENT: 2.3 %
EPITHELIAL CELLS, UA: ABNORMAL /HPF
GFR AFRICAN AMERICAN: 54
GFR NON-AFRICAN AMERICAN: 44
GLOBULIN: 4.4 G/DL
GLUCOSE BLD-MCNC: 165 MG/DL (ref 70–99)
GLUCOSE URINE: NEGATIVE MG/DL
HCT VFR BLD CALC: 36.1 % (ref 40.5–52.5)
HEMOGLOBIN: 11.4 G/DL (ref 13.5–17.5)
KETONES, URINE: NEGATIVE MG/DL
LEUKOCYTE ESTERASE, URINE: ABNORMAL
LIPASE: 23 U/L (ref 13–60)
LYMPHOCYTES ABSOLUTE: 1.2 K/UL (ref 1–5.1)
LYMPHOCYTES RELATIVE PERCENT: 12.6 %
MCH RBC QN AUTO: 25.2 PG (ref 26–34)
MCHC RBC AUTO-ENTMCNC: 31.7 G/DL (ref 31–36)
MCV RBC AUTO: 79.6 FL (ref 80–100)
MICROSCOPIC EXAMINATION: YES
MONOCYTES ABSOLUTE: 0.9 K/UL (ref 0–1.3)
MONOCYTES RELATIVE PERCENT: 9.3 %
NEUTROPHILS ABSOLUTE: 7.4 K/UL (ref 1.7–7.7)
NEUTROPHILS RELATIVE PERCENT: 75.3 %
NITRITE, URINE: NEGATIVE
PDW BLD-RTO: 17 % (ref 12.4–15.4)
PH UA: 5.5 (ref 5–8)
PLATELET # BLD: 440 K/UL (ref 135–450)
PMV BLD AUTO: 6.4 FL (ref 5–10.5)
POTASSIUM SERPL-SCNC: 4.1 MMOL/L (ref 3.5–5.1)
PROTEIN UA: NEGATIVE MG/DL
RBC # BLD: 4.53 M/UL (ref 4.2–5.9)
RBC UA: ABNORMAL /HPF (ref 0–2)
SODIUM BLD-SCNC: 141 MMOL/L (ref 136–145)
SPECIFIC GRAVITY UA: 1.01 (ref 1–1.03)
TOTAL PROTEIN: 8.2 G/DL (ref 6.4–8.2)
URINE REFLEX TO CULTURE: YES
URINE TYPE: ABNORMAL
UROBILINOGEN, URINE: 0.2 E.U./DL
WBC # BLD: 9.8 K/UL (ref 4–11)
WBC UA: ABNORMAL /HPF (ref 0–5)

## 2019-09-23 PROCEDURE — 36415 COLL VENOUS BLD VENIPUNCTURE: CPT

## 2019-09-23 PROCEDURE — 6360000002 HC RX W HCPCS: Performed by: EMERGENCY MEDICINE

## 2019-09-23 PROCEDURE — 85025 COMPLETE CBC W/AUTO DIFF WBC: CPT

## 2019-09-23 PROCEDURE — 93005 ELECTROCARDIOGRAM TRACING: CPT | Performed by: EMERGENCY MEDICINE

## 2019-09-23 PROCEDURE — 2580000003 HC RX 258: Performed by: EMERGENCY MEDICINE

## 2019-09-23 PROCEDURE — 96375 TX/PRO/DX INJ NEW DRUG ADDON: CPT

## 2019-09-23 PROCEDURE — 99285 EMERGENCY DEPT VISIT HI MDM: CPT

## 2019-09-23 PROCEDURE — 96374 THER/PROPH/DIAG INJ IV PUSH: CPT

## 2019-09-23 PROCEDURE — 83690 ASSAY OF LIPASE: CPT

## 2019-09-23 PROCEDURE — 2500000003 HC RX 250 WO HCPCS: Performed by: EMERGENCY MEDICINE

## 2019-09-23 PROCEDURE — 87086 URINE CULTURE/COLONY COUNT: CPT

## 2019-09-23 PROCEDURE — 81001 URINALYSIS AUTO W/SCOPE: CPT

## 2019-09-23 PROCEDURE — 80053 COMPREHEN METABOLIC PANEL: CPT

## 2019-09-23 PROCEDURE — 74176 CT ABD & PELVIS W/O CONTRAST: CPT

## 2019-09-23 RX ORDER — CEFUROXIME AXETIL 250 MG/1
500 TABLET ORAL ONCE
Status: DISCONTINUED | OUTPATIENT
Start: 2019-09-23 | End: 2019-09-23

## 2019-09-23 RX ORDER — CEFUROXIME AXETIL 500 MG/1
500 TABLET ORAL 2 TIMES DAILY
Qty: 20 TABLET | Refills: 0 | Status: SHIPPED | OUTPATIENT
Start: 2019-09-23 | End: 2019-10-03

## 2019-09-23 RX ORDER — HYDROCODONE BITARTRATE AND ACETAMINOPHEN 5; 325 MG/1; MG/1
1 TABLET ORAL EVERY 6 HOURS PRN
Qty: 12 TABLET | Refills: 0 | Status: SHIPPED | OUTPATIENT
Start: 2019-09-23 | End: 2019-09-28

## 2019-09-23 RX ORDER — ONDANSETRON 2 MG/ML
4 INJECTION INTRAMUSCULAR; INTRAVENOUS ONCE
Status: COMPLETED | OUTPATIENT
Start: 2019-09-23 | End: 2019-09-23

## 2019-09-23 RX ADMIN — ONDANSETRON 4 MG: 2 INJECTION INTRAMUSCULAR; INTRAVENOUS at 19:52

## 2019-09-23 RX ADMIN — HYDROMORPHONE HYDROCHLORIDE 0.5 MG: 1 INJECTION, SOLUTION INTRAMUSCULAR; INTRAVENOUS; SUBCUTANEOUS at 19:52

## 2019-09-23 RX ADMIN — CEFTRIAXONE SODIUM 1 G: 1 INJECTION, POWDER, FOR SOLUTION INTRAMUSCULAR; INTRAVENOUS at 21:57

## 2019-09-23 ASSESSMENT — PAIN DESCRIPTION - PAIN TYPE: TYPE: ACUTE PAIN

## 2019-09-23 ASSESSMENT — PAIN SCALES - GENERAL: PAINLEVEL_OUTOF10: 9

## 2019-09-23 ASSESSMENT — PAIN DESCRIPTION - LOCATION: LOCATION: FLANK

## 2019-09-23 ASSESSMENT — PAIN DESCRIPTION - DESCRIPTORS: DESCRIPTORS: ACHING

## 2019-09-23 ASSESSMENT — PAIN DESCRIPTION - ORIENTATION: ORIENTATION: LEFT

## 2019-09-23 NOTE — ED PROVIDER NOTES
Incomplete colonoscopy; Poor prep    COLONOSCOPY N/A 7/29/2019    COLON W/ANES. (8:00) (DAUGHTER,JANIA, IS POA, WILL BE HERE FOR PROCEDURE) performed by Lisette Elizabeth DO at [address] [facility]  08/22/2019    colon;    COLONOSCOPY N/A 8/22/2019    COLONOSCOPY POLYPECTOMY SNARE/COLD BIOPSY performed by Lisette Elizabeth DO at [address]      PACEMAKER PLACEMENT       Family History   Problem Relation Age of Onset    Heart Attack Mother     Tuberculosis Mother     Heart Attack Father     Cancer Sister     Cancer Brother     Cancer Sister     Cancer Sister      Social History     Socioeconomic History    Marital status:      Spouse name: Not on file    Number of children: Not on file    Years of education: Not on file    Highest education level: Not on file   Occupational History    Not on file   Social Needs    Financial resource strain: Not on file    Food insecurity:     Worry: Not on file     Inability: Not on file    Transportation needs:     Medical: Not on file     Non-medical: Not on file   Tobacco Use    Smoking status: Former Smoker    Smokeless tobacco: Never Used    Tobacco comment: prior to 2010   Substance and Sexual Activity    Alcohol use: No    Drug use: No    Sexual activity: Not Currently   Lifestyle    Physical activity:     Days per week: Not on file     Minutes per session: Not on file    Stress: Not on file   Relationships    Social connections:     Talks on phone: Not on file     Gets together: Not on file     Attends Evangelical service: Not on file     Active member of club or organization: Not on file     Attends meetings of clubs or organizations: Not on file     Relationship status: Not on file    Intimate partner violence:     Fear of current or ex partner: Not on file     Emotionally abused: Not on file     Physically abused: Not on file     Forced sexual activity: Not on file 37 U/L    Globulin 4.4 g/dL   Lipase   Result Value Ref Range    Lipase 23.0 13.0 - 60.0 U/L   Urinalysis Reflex to Culture   Result Value Ref Range    Color, UA Yellow Straw/Yellow    Clarity, UA SL CLOUDY (A) Clear    Glucose, Ur Negative Negative mg/dL    Bilirubin Urine Negative Negative    Ketones, Urine Negative Negative mg/dL    Specific Gravity, UA 1.015 1.005 - 1.030    Blood, Urine TRACE-INTACT (A) Negative    pH, UA 5.5 5.0 - 8.0    Protein, UA Negative Negative mg/dL    Urobilinogen, Urine 0.2 <2.0 E.U./dL    Nitrite, Urine Negative Negative    Leukocyte Esterase, Urine LARGE (A) Negative    Microscopic Examination YES     Urine Reflex to Culture Yes     Urine Type Not Specified    Microscopic Urinalysis   Result Value Ref Range    WBC, UA 20-50 (A) 0 - 5 /HPF    RBC, UA 0-2 0 - 2 /HPF    Epi Cells 0-2 /HPF        Radiographs (if obtained):  ? Radiologist's Report Reviewed:  CT ABDOMEN PELVIS WO CONTRAST   Final Result   Stones and sludge within the dependent gallbladder. Bilateral nephrolithiasis. 4.3 cm infrarenal abdominal aortic aneurysm. Annual follow-up as well as   vascular consultation is recommended. .             ? Discussed with Radiologist:     ? The following radiograph was interpreted by myself in the absence of a radiologist:     EKG (if obtained): (All EKG's are interpreted by myself in the absence of a cardiologist)  EKG demonstrates a paced rhythm which the computer has confused with an interventricular conduction delay. Intrinsic rhythm does show an incomplete right bundle branch block with inferior and septal Q waves but much like his previous EKG    MDM:   Patient with flank pain on the left radiating the left abdomen presents for evaluation. No kidney stone was identified on CT. He does have a 4.5 cm infrarenal aortic aneurysm that has not changed in size. Patient states it was 4.5 cm when it was last studied. And does not appear to be a cause of his pain tonight.   He

## 2019-09-24 LAB
EKG ATRIAL RATE: 56 BPM
EKG DIAGNOSIS: NORMAL
EKG Q-T INTERVAL: 442 MS
EKG QRS DURATION: 142 MS
EKG QTC CALCULATION (BAZETT): 483 MS
EKG R AXIS: 266 DEGREES
EKG T AXIS: 43 DEGREES
EKG VENTRICULAR RATE: 72 BPM

## 2019-09-24 PROCEDURE — 93010 ELECTROCARDIOGRAM REPORT: CPT | Performed by: INTERNAL MEDICINE

## 2019-09-26 LAB — URINE CULTURE, ROUTINE: NORMAL

## 2019-10-07 ENCOUNTER — HOSPITAL ENCOUNTER (OUTPATIENT)
Dept: WOUND CARE | Age: 84
Discharge: HOME | End: 2019-10-07
Payer: MEDICARE

## 2019-10-07 VITALS
SYSTOLIC BLOOD PRESSURE: 125 MMHG | TEMPERATURE: 97.3 F | RESPIRATION RATE: 18 BRPM | HEART RATE: 71 BPM | DIASTOLIC BLOOD PRESSURE: 61 MMHG

## 2019-10-07 PROCEDURE — 99213 OFFICE O/P EST LOW 20 MIN: CPT

## 2019-10-07 RX ORDER — INSULIN GLARGINE 100 [IU]/ML
20 INJECTION, SOLUTION SUBCUTANEOUS NIGHTLY
Status: ON HOLD | COMMUNITY
End: 2020-08-17 | Stop reason: HOSPADM

## 2019-10-07 RX ORDER — LIDOCAINE 40 MG/G
CREAM TOPICAL ONCE
Status: DISCONTINUED | OUTPATIENT
Start: 2019-10-07 | End: 2019-10-08 | Stop reason: HOSPADM

## 2019-10-07 ASSESSMENT — PAIN DESCRIPTION - PAIN TYPE: TYPE: ACUTE PAIN

## 2019-10-07 ASSESSMENT — PAIN DESCRIPTION - DESCRIPTORS: DESCRIPTORS: ACHING

## 2019-10-07 ASSESSMENT — PAIN - FUNCTIONAL ASSESSMENT: PAIN_FUNCTIONAL_ASSESSMENT: PREVENTS OR INTERFERES SOME ACTIVE ACTIVITIES AND ADLS

## 2019-10-07 ASSESSMENT — PAIN DESCRIPTION - FREQUENCY: FREQUENCY: CONTINUOUS

## 2019-10-07 ASSESSMENT — PAIN SCALES - GENERAL
PAINLEVEL_OUTOF10: 5
PAINLEVEL_OUTOF10: 0

## 2019-10-07 ASSESSMENT — PAIN DESCRIPTION - LOCATION: LOCATION: TOE (COMMENT WHICH ONE)

## 2019-10-07 ASSESSMENT — PAIN DESCRIPTION - PROGRESSION: CLINICAL_PROGRESSION: NOT CHANGED

## 2019-10-07 ASSESSMENT — PAIN DESCRIPTION - ORIENTATION: ORIENTATION: LEFT

## 2019-10-07 ASSESSMENT — PAIN DESCRIPTION - ONSET: ONSET: ON-GOING

## 2019-10-10 ENCOUNTER — ANESTHESIA (OUTPATIENT)
Dept: OPERATING ROOM | Age: 84
End: 2019-10-10
Payer: MEDICARE

## 2019-10-10 ENCOUNTER — ANESTHESIA EVENT (OUTPATIENT)
Dept: OPERATING ROOM | Age: 84
End: 2019-10-10
Payer: MEDICARE

## 2019-10-10 ENCOUNTER — HOSPITAL ENCOUNTER (OUTPATIENT)
Age: 84
Setting detail: OUTPATIENT SURGERY
Discharge: HOME | End: 2019-10-10
Attending: PODIATRIST | Admitting: PODIATRIST
Payer: MEDICARE

## 2019-10-10 ENCOUNTER — APPOINTMENT (OUTPATIENT)
Dept: GENERAL RADIOLOGY | Age: 84
End: 2019-10-10
Attending: PODIATRIST
Payer: MEDICARE

## 2019-10-10 VITALS
DIASTOLIC BLOOD PRESSURE: 77 MMHG | HEIGHT: 68 IN | OXYGEN SATURATION: 96 % | WEIGHT: 170 LBS | BODY MASS INDEX: 25.76 KG/M2 | HEART RATE: 70 BPM | TEMPERATURE: 97.6 F | RESPIRATION RATE: 16 BRPM | SYSTOLIC BLOOD PRESSURE: 159 MMHG

## 2019-10-10 VITALS
RESPIRATION RATE: 9 BRPM | DIASTOLIC BLOOD PRESSURE: 57 MMHG | SYSTOLIC BLOOD PRESSURE: 99 MMHG | OXYGEN SATURATION: 100 %

## 2019-10-10 DIAGNOSIS — G89.18 POSTOPERATIVE PAIN: Primary | ICD-10-CM

## 2019-10-10 LAB
GLUCOSE BLD-MCNC: 141 MG/DL (ref 70–99)
GLUCOSE BLD-MCNC: 68 MG/DL (ref 70–99)
GLUCOSE BLD-MCNC: 87 MG/DL (ref 70–99)
GLUCOSE BLD-MCNC: 91 MG/DL (ref 70–99)
PERFORMED ON: ABNORMAL
PERFORMED ON: ABNORMAL
PERFORMED ON: NORMAL
PERFORMED ON: NORMAL

## 2019-10-10 PROCEDURE — 7100000011 HC PHASE II RECOVERY - ADDTL 15 MIN: Performed by: PODIATRIST

## 2019-10-10 PROCEDURE — 88305 TISSUE EXAM BY PATHOLOGIST: CPT

## 2019-10-10 PROCEDURE — 2580000003 HC RX 258: Performed by: ANESTHESIOLOGY

## 2019-10-10 PROCEDURE — 3600000012 HC SURGERY LEVEL 2 ADDTL 15MIN: Performed by: PODIATRIST

## 2019-10-10 PROCEDURE — 6360000002 HC RX W HCPCS: Performed by: PODIATRIST

## 2019-10-10 PROCEDURE — 88311 DECALCIFY TISSUE: CPT

## 2019-10-10 PROCEDURE — 2709999900 HC NON-CHARGEABLE SUPPLY: Performed by: PODIATRIST

## 2019-10-10 PROCEDURE — 3600000002 HC SURGERY LEVEL 2 BASE: Performed by: PODIATRIST

## 2019-10-10 PROCEDURE — 2500000003 HC RX 250 WO HCPCS: Performed by: NURSE ANESTHETIST, CERTIFIED REGISTERED

## 2019-10-10 PROCEDURE — 6360000002 HC RX W HCPCS: Performed by: NURSE ANESTHETIST, CERTIFIED REGISTERED

## 2019-10-10 PROCEDURE — 7100000010 HC PHASE II RECOVERY - FIRST 15 MIN: Performed by: PODIATRIST

## 2019-10-10 PROCEDURE — 73620 X-RAY EXAM OF FOOT: CPT

## 2019-10-10 PROCEDURE — 3700000000 HC ANESTHESIA ATTENDED CARE: Performed by: PODIATRIST

## 2019-10-10 PROCEDURE — 3700000001 HC ADD 15 MINUTES (ANESTHESIA): Performed by: PODIATRIST

## 2019-10-10 PROCEDURE — 2500000003 HC RX 250 WO HCPCS: Performed by: PODIATRIST

## 2019-10-10 RX ORDER — LIDOCAINE HYDROCHLORIDE 20 MG/ML
INJECTION, SOLUTION INFILTRATION; PERINEURAL PRN
Status: DISCONTINUED | OUTPATIENT
Start: 2019-10-10 | End: 2019-10-10 | Stop reason: SDUPTHER

## 2019-10-10 RX ORDER — OXYCODONE HYDROCHLORIDE AND ACETAMINOPHEN 5; 325 MG/1; MG/1
1 TABLET ORAL PRN
Status: DISCONTINUED | OUTPATIENT
Start: 2019-10-10 | End: 2019-10-10 | Stop reason: HOSPADM

## 2019-10-10 RX ORDER — SODIUM CHLORIDE, SODIUM LACTATE, POTASSIUM CHLORIDE, CALCIUM CHLORIDE 600; 310; 30; 20 MG/100ML; MG/100ML; MG/100ML; MG/100ML
INJECTION, SOLUTION INTRAVENOUS CONTINUOUS
Status: DISCONTINUED | OUTPATIENT
Start: 2019-10-10 | End: 2019-10-10 | Stop reason: HOSPADM

## 2019-10-10 RX ORDER — LABETALOL HYDROCHLORIDE 5 MG/ML
5 INJECTION, SOLUTION INTRAVENOUS EVERY 10 MIN PRN
Status: DISCONTINUED | OUTPATIENT
Start: 2019-10-10 | End: 2019-10-10 | Stop reason: HOSPADM

## 2019-10-10 RX ORDER — ONDANSETRON 2 MG/ML
4 INJECTION INTRAMUSCULAR; INTRAVENOUS EVERY 10 MIN PRN
Status: DISCONTINUED | OUTPATIENT
Start: 2019-10-10 | End: 2019-10-10 | Stop reason: HOSPADM

## 2019-10-10 RX ORDER — SODIUM CHLORIDE 0.9 % (FLUSH) 0.9 %
10 SYRINGE (ML) INJECTION EVERY 12 HOURS SCHEDULED
Status: DISCONTINUED | OUTPATIENT
Start: 2019-10-10 | End: 2019-10-10 | Stop reason: HOSPADM

## 2019-10-10 RX ORDER — LIDOCAINE HYDROCHLORIDE 10 MG/ML
0.3 INJECTION, SOLUTION EPIDURAL; INFILTRATION; INTRACAUDAL; PERINEURAL
Status: DISCONTINUED | OUTPATIENT
Start: 2019-10-10 | End: 2019-10-10 | Stop reason: HOSPADM

## 2019-10-10 RX ORDER — OXYCODONE HYDROCHLORIDE AND ACETAMINOPHEN 5; 325 MG/1; MG/1
2 TABLET ORAL PRN
Status: DISCONTINUED | OUTPATIENT
Start: 2019-10-10 | End: 2019-10-10 | Stop reason: HOSPADM

## 2019-10-10 RX ORDER — SODIUM CHLORIDE 0.9 % (FLUSH) 0.9 %
10 SYRINGE (ML) INJECTION PRN
Status: DISCONTINUED | OUTPATIENT
Start: 2019-10-10 | End: 2019-10-10 | Stop reason: HOSPADM

## 2019-10-10 RX ORDER — OXYCODONE HYDROCHLORIDE AND ACETAMINOPHEN 5; 325 MG/1; MG/1
1-2 TABLET ORAL EVERY 4 HOURS PRN
Qty: 28 TABLET | Refills: 0 | Status: SHIPPED | OUTPATIENT
Start: 2019-10-10 | End: 2019-10-21

## 2019-10-10 RX ORDER — PROPOFOL 10 MG/ML
INJECTION, EMULSION INTRAVENOUS PRN
Status: DISCONTINUED | OUTPATIENT
Start: 2019-10-10 | End: 2019-10-10 | Stop reason: SDUPTHER

## 2019-10-10 RX ORDER — HYDRALAZINE HYDROCHLORIDE 20 MG/ML
5 INJECTION INTRAMUSCULAR; INTRAVENOUS EVERY 10 MIN PRN
Status: DISCONTINUED | OUTPATIENT
Start: 2019-10-10 | End: 2019-10-10 | Stop reason: HOSPADM

## 2019-10-10 RX ADMIN — PROPOFOL 200 MG: 10 INJECTION, EMULSION INTRAVENOUS at 08:48

## 2019-10-10 RX ADMIN — Medication 2 G: at 08:42

## 2019-10-10 RX ADMIN — LIDOCAINE HYDROCHLORIDE 40 MG: 20 INJECTION, SOLUTION INFILTRATION; PERINEURAL at 08:48

## 2019-10-10 RX ADMIN — SODIUM CHLORIDE, POTASSIUM CHLORIDE, SODIUM LACTATE AND CALCIUM CHLORIDE: 600; 310; 30; 20 INJECTION, SOLUTION INTRAVENOUS at 08:48

## 2019-10-10 ASSESSMENT — PULMONARY FUNCTION TESTS
PIF_VALUE: 0

## 2019-10-10 ASSESSMENT — PAIN SCALES - GENERAL: PAINLEVEL_OUTOF10: 0

## 2019-10-10 ASSESSMENT — PAIN - FUNCTIONAL ASSESSMENT: PAIN_FUNCTIONAL_ASSESSMENT: 0-10

## 2019-10-10 ASSESSMENT — COPD QUESTIONNAIRES: CAT_SEVERITY: NO INTERVAL CHANGE

## 2019-10-21 ENCOUNTER — HOSPITAL ENCOUNTER (OUTPATIENT)
Dept: WOUND CARE | Age: 84
Discharge: HOME | End: 2019-10-21
Payer: MEDICARE

## 2019-10-21 VITALS
HEIGHT: 68 IN | SYSTOLIC BLOOD PRESSURE: 115 MMHG | TEMPERATURE: 97.5 F | BODY MASS INDEX: 24.71 KG/M2 | RESPIRATION RATE: 18 BRPM | HEART RATE: 73 BPM | DIASTOLIC BLOOD PRESSURE: 65 MMHG | WEIGHT: 163 LBS

## 2019-10-21 PROCEDURE — 99213 OFFICE O/P EST LOW 20 MIN: CPT

## 2019-10-21 RX ORDER — LIDOCAINE 40 MG/G
CREAM TOPICAL ONCE
Status: DISCONTINUED | OUTPATIENT
Start: 2019-10-21 | End: 2019-10-22 | Stop reason: HOSPADM

## 2019-10-21 ASSESSMENT — PAIN SCALES - GENERAL
PAINLEVEL_OUTOF10: 0
PAINLEVEL_OUTOF10: 0

## 2019-11-04 ENCOUNTER — HOSPITAL ENCOUNTER (OUTPATIENT)
Dept: WOUND CARE | Age: 84
Discharge: HOME | End: 2019-11-04
Payer: MEDICARE

## 2019-11-04 VITALS
SYSTOLIC BLOOD PRESSURE: 128 MMHG | DIASTOLIC BLOOD PRESSURE: 63 MMHG | RESPIRATION RATE: 18 BRPM | TEMPERATURE: 98.2 F | HEART RATE: 84 BPM

## 2019-11-04 PROCEDURE — 99213 OFFICE O/P EST LOW 20 MIN: CPT

## 2019-11-04 RX ORDER — LIDOCAINE 40 MG/G
CREAM TOPICAL ONCE
Status: DISCONTINUED | OUTPATIENT
Start: 2019-11-04 | End: 2019-11-05 | Stop reason: HOSPADM

## 2019-11-04 ASSESSMENT — PAIN SCALES - GENERAL
PAINLEVEL_OUTOF10: 0
PAINLEVEL_OUTOF10: 0

## 2019-11-18 ENCOUNTER — HOSPITAL ENCOUNTER (OUTPATIENT)
Dept: WOUND CARE | Age: 84
Discharge: HOME | End: 2019-11-18
Payer: MEDICARE

## 2019-11-18 VITALS
BODY MASS INDEX: 24.74 KG/M2 | HEART RATE: 81 BPM | HEIGHT: 68 IN | WEIGHT: 163.2 LBS | RESPIRATION RATE: 18 BRPM | DIASTOLIC BLOOD PRESSURE: 63 MMHG | TEMPERATURE: 97.7 F | SYSTOLIC BLOOD PRESSURE: 127 MMHG

## 2019-11-18 PROCEDURE — 11042 DBRDMT SUBQ TIS 1ST 20SQCM/<: CPT

## 2019-11-18 RX ORDER — LIDOCAINE 40 MG/G
CREAM TOPICAL ONCE
Status: DISCONTINUED | OUTPATIENT
Start: 2019-11-18 | End: 2019-11-19 | Stop reason: HOSPADM

## 2019-11-18 ASSESSMENT — PAIN DESCRIPTION - ONSET: ONSET: ON-GOING

## 2019-11-18 ASSESSMENT — PAIN DESCRIPTION - PROGRESSION: CLINICAL_PROGRESSION: NOT CHANGED

## 2019-11-18 ASSESSMENT — PAIN DESCRIPTION - LOCATION: LOCATION: ANKLE

## 2019-11-18 ASSESSMENT — PAIN DESCRIPTION - FREQUENCY: FREQUENCY: INTERMITTENT

## 2019-11-18 ASSESSMENT — PAIN SCALES - GENERAL
PAINLEVEL_OUTOF10: 7
PAINLEVEL_OUTOF10: 0

## 2019-11-18 ASSESSMENT — PAIN DESCRIPTION - ORIENTATION: ORIENTATION: RIGHT

## 2019-11-18 ASSESSMENT — PAIN DESCRIPTION - DESCRIPTORS: DESCRIPTORS: DULL;ACHING

## 2019-11-18 ASSESSMENT — PAIN DESCRIPTION - PAIN TYPE: TYPE: ACUTE PAIN

## 2019-11-18 ASSESSMENT — PAIN - FUNCTIONAL ASSESSMENT: PAIN_FUNCTIONAL_ASSESSMENT: PREVENTS OR INTERFERES SOME ACTIVE ACTIVITIES AND ADLS

## 2019-12-02 ENCOUNTER — HOSPITAL ENCOUNTER (OUTPATIENT)
Dept: WOUND CARE | Age: 84
Discharge: HOME | End: 2019-12-02
Payer: MEDICARE

## 2019-12-02 VITALS
WEIGHT: 161.8 LBS | BODY MASS INDEX: 24.52 KG/M2 | DIASTOLIC BLOOD PRESSURE: 71 MMHG | TEMPERATURE: 97 F | RESPIRATION RATE: 16 BRPM | HEIGHT: 68 IN | SYSTOLIC BLOOD PRESSURE: 123 MMHG | HEART RATE: 79 BPM

## 2019-12-02 PROCEDURE — 11042 DBRDMT SUBQ TIS 1ST 20SQCM/<: CPT

## 2019-12-02 RX ORDER — LIDOCAINE 40 MG/G
CREAM TOPICAL PRN
Status: DISCONTINUED | OUTPATIENT
Start: 2019-12-02 | End: 2019-12-03 | Stop reason: HOSPADM

## 2019-12-02 ASSESSMENT — PAIN DESCRIPTION - PAIN TYPE: TYPE: CHRONIC PAIN

## 2019-12-02 ASSESSMENT — PAIN DESCRIPTION - PROGRESSION: CLINICAL_PROGRESSION: NOT CHANGED

## 2019-12-02 ASSESSMENT — PAIN - FUNCTIONAL ASSESSMENT: PAIN_FUNCTIONAL_ASSESSMENT: PREVENTS OR INTERFERES SOME ACTIVE ACTIVITIES AND ADLS

## 2019-12-02 ASSESSMENT — PAIN SCALES - GENERAL
PAINLEVEL_OUTOF10: 0
PAINLEVEL_OUTOF10: 5

## 2019-12-02 ASSESSMENT — PAIN DESCRIPTION - LOCATION: LOCATION: FOOT

## 2019-12-02 ASSESSMENT — PAIN DESCRIPTION - ONSET: ONSET: ON-GOING

## 2019-12-02 ASSESSMENT — PAIN DESCRIPTION - DESCRIPTORS: DESCRIPTORS: ACHING

## 2019-12-02 ASSESSMENT — PAIN DESCRIPTION - FREQUENCY: FREQUENCY: INTERMITTENT

## 2019-12-02 ASSESSMENT — PAIN DESCRIPTION - ORIENTATION: ORIENTATION: RIGHT

## 2019-12-16 ENCOUNTER — HOSPITAL ENCOUNTER (OUTPATIENT)
Dept: WOUND CARE | Age: 84
Discharge: HOME | End: 2019-12-16
Payer: MEDICARE

## 2019-12-16 VITALS
BODY MASS INDEX: 24.4 KG/M2 | SYSTOLIC BLOOD PRESSURE: 144 MMHG | TEMPERATURE: 97.7 F | RESPIRATION RATE: 16 BRPM | WEIGHT: 161 LBS | DIASTOLIC BLOOD PRESSURE: 79 MMHG | HEIGHT: 68 IN | HEART RATE: 75 BPM

## 2019-12-16 PROCEDURE — 11042 DBRDMT SUBQ TIS 1ST 20SQCM/<: CPT

## 2019-12-16 RX ORDER — LIDOCAINE 40 MG/G
CREAM TOPICAL ONCE
Status: DISCONTINUED | OUTPATIENT
Start: 2019-12-16 | End: 2019-12-17 | Stop reason: HOSPADM

## 2019-12-16 RX ORDER — FERROUS SULFATE 325(65) MG
325 TABLET ORAL
Status: ON HOLD | COMMUNITY
End: 2020-08-10 | Stop reason: ALTCHOICE

## 2019-12-16 ASSESSMENT — PAIN SCALES - GENERAL
PAINLEVEL_OUTOF10: 0
PAINLEVEL_OUTOF10: 0

## 2019-12-30 ENCOUNTER — HOSPITAL ENCOUNTER (OUTPATIENT)
Dept: WOUND CARE | Age: 84
Discharge: HOME | End: 2019-12-30
Payer: MEDICARE

## 2019-12-30 VITALS
WEIGHT: 163 LBS | HEIGHT: 68 IN | BODY MASS INDEX: 24.71 KG/M2 | SYSTOLIC BLOOD PRESSURE: 124 MMHG | DIASTOLIC BLOOD PRESSURE: 70 MMHG | RESPIRATION RATE: 16 BRPM | HEART RATE: 77 BPM | TEMPERATURE: 97.6 F

## 2019-12-30 PROCEDURE — 11042 DBRDMT SUBQ TIS 1ST 20SQCM/<: CPT

## 2019-12-30 ASSESSMENT — PAIN SCALES - GENERAL
PAINLEVEL_OUTOF10: 0
PAINLEVEL_OUTOF10: 0

## 2020-01-13 ENCOUNTER — HOSPITAL ENCOUNTER (OUTPATIENT)
Dept: WOUND CARE | Age: 85
Discharge: HOME | End: 2020-01-13
Payer: MEDICARE

## 2020-01-13 VITALS
HEIGHT: 68 IN | TEMPERATURE: 98 F | WEIGHT: 165 LBS | HEART RATE: 79 BPM | DIASTOLIC BLOOD PRESSURE: 68 MMHG | SYSTOLIC BLOOD PRESSURE: 117 MMHG | RESPIRATION RATE: 16 BRPM | BODY MASS INDEX: 25.01 KG/M2

## 2020-01-13 PROCEDURE — 99213 OFFICE O/P EST LOW 20 MIN: CPT

## 2020-01-13 RX ORDER — LIDOCAINE 40 MG/G
CREAM TOPICAL ONCE
Status: DISCONTINUED | OUTPATIENT
Start: 2020-01-13 | End: 2020-01-14 | Stop reason: HOSPADM

## 2020-01-13 RX ORDER — TRIAMCINOLONE ACETONIDE 0.25 MG/G
CREAM TOPICAL EVERY 4 HOURS PRN
Status: ON HOLD | COMMUNITY
End: 2020-08-10

## 2020-01-13 ASSESSMENT — PAIN DESCRIPTION - FREQUENCY: FREQUENCY: INTERMITTENT

## 2020-01-13 ASSESSMENT — PAIN SCALES - GENERAL
PAINLEVEL_OUTOF10: 0
PAINLEVEL_OUTOF10: 5

## 2020-01-13 ASSESSMENT — PAIN DESCRIPTION - PROGRESSION: CLINICAL_PROGRESSION: GRADUALLY WORSENING

## 2020-01-13 ASSESSMENT — PAIN DESCRIPTION - ONSET: ONSET: ON-GOING

## 2020-01-13 ASSESSMENT — PAIN DESCRIPTION - DESCRIPTORS: DESCRIPTORS: ACHING

## 2020-01-13 ASSESSMENT — PAIN DESCRIPTION - PAIN TYPE: TYPE: CHRONIC PAIN

## 2020-01-13 ASSESSMENT — PAIN DESCRIPTION - LOCATION: LOCATION: FOOT

## 2020-01-13 ASSESSMENT — PAIN - FUNCTIONAL ASSESSMENT: PAIN_FUNCTIONAL_ASSESSMENT: PREVENTS OR INTERFERES SOME ACTIVE ACTIVITIES AND ADLS

## 2020-01-13 ASSESSMENT — PAIN DESCRIPTION - ORIENTATION: ORIENTATION: RIGHT

## 2020-01-14 ENCOUNTER — TELEPHONE (OUTPATIENT)
Dept: WOUND CARE | Age: 85
End: 2020-01-14

## 2020-01-14 NOTE — TELEPHONE ENCOUNTER
Theresa with [facility] called stating \"do not have transportation for resident, need to reschedule\". Patient rescheduled for 2/10.

## 2020-01-16 NOTE — PROGRESS NOTES
[facility] Progress Note      Nini Hurtado     : 1934    DATE OF VISIT:  2020    Subjective:     Nini Hurtado is a 80 y.o. male who has a chief complaint of a diabetic ulcer located on the bilateral foot. Significant symptoms or pertinent ulcer history since last visit: States still doing well. Current complaint of pain in this ulcer? No.        Mr. Ana Cristina Stevens has a past medical history of AAA (abdominal aortic aneurysm) ([address] Acute constipation, Acute gastrointestinal hemorrhage, Anemia, Bladder CA in situ, BPH (benign prostatic hyperplasia), Cancer ([address] CHF (congestive heart failure) ([address] Constipation, COPD (chronic obstructive pulmonary disease) ([address] Coronary arteriosclerosis, Current mild episode of major depressive disorder ([address] Depression, Diabetes mellitus ([address] Diabetic neuropathy ([address] Diverticulitis of intestine w/o perforation or abscess with bleeding, GERD (gastroesophageal reflux disease), GI hemorrhage, Hypercholesteremia, Hypertension, Hypoaldosteronism ([address] 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 He has a past surgical history that includes eye surgery; Pacemaker insertion; Cardiac surgery; pacemaker placement; Colonoscopy (2019); Colonoscopy (N/A, 2019); Colonoscopy (2019); Colonoscopy (N/A, 2019); Toe amputation (Left, 10/10/2019); and Toe amputation (Left, 10/10/2019). His family history includes Cancer in his brother, sister, sister, and sister; Heart Attack in his father and mother; Tuberculosis in his mother. Mr. Ana Cristina Stevens reports that he has quit smoking. He has never used smokeless tobacco. He reports that he does not drink alcohol or use drugs.     His current medication list consists of Alogliptin Benzoate, DULoxetine, Protein, acetaminophen, aspirin, atorvastatin, ferrous sulfate, finasteride, fluticasone-salmeterol, furosemide, glipiZIDE, insulin glargine, insulin lispro, metoprolol succinate, omeprazole, polyethylene glycol, triamcinolone, and vitamin D. Allergies: No known allergies    Pertinent items from the review of systems are discussed in the HPI; the remainder of the ROS was reviewed and is negative. Denies nausea, vomiting, fevers, chills, shortness of breath or chest pain. Objective:     /68   Pulse 79   Temp 98 °F (36.7 °C) (Oral)   Resp 16   Ht 5' 8\" (1.727 m)   Wt 165 lb (74.8 kg)   BMI 25.09 kg/m²      General Appearance: alert and oriented to person, place and time, well developed and well- nourished, in no acute distress  Skin: warm and dry, no rash or erythema  Head: normocephalic and atraumatic  Eyes: pupils equal, round, and reactive to light, extraocular eye movements intact, conjunctivae normal  ENT: tympanic membrane, external ear and ear canal normal bilaterally, nose without deformity, nasal mucosa and turbinates normal without polyps  Neck: supple and non-tender without mass, no thyromegaly or thyroid nodules, no cervical lymphadenopathy  Pulmonary/Chest: clear to auscultation bilaterally- no wheezes, rales or rhonchi, normal air movement, no respiratory distress  Cardiovascular: normal rate, regular rhythm, normal S1 and S2, no murmurs, rubs, clicks, or gallops, distal pulses intact, no carotid bruits  Abdomen: soft, non-tender, non-distended, normal bowel sounds, no masses or organomegaly.     Dorsalis pedis pulse left dopplerable  Posterior tibial pulse left dopplerable  Dorsalis pedis pulse right dopplerable  Posterior tibial pulse right dopplerable  Protective sensation absent bilateral LE      Ulcer on the right lateral ankle with mild to moderate fibrotic tissue, red granulation tissue, mild serous drainage, no hyperkeratotic rim, no undermining, no tunneling, no purulence, no malodor, no eschar, no periwound maceration, mild periwound erythema, no edema, no crepitus, no increase in skin temperature, ulcer probes to soft tissue only    Ulcer on the left hallux amputation site epithelialized. No drainage noted. Pre-debridement ulcer measurements are in the wound documentation flowsheet. Post  Debridement Measurements:  Today's wound measurements:  Wound 08/19/19 # 1, Right Lateral Ankle, Diabetic Foot Ulcer, Bang 1, (onset July 2019)-Wound Length (cm): 0.5 cm    Wound 08/19/19 # 1, Right Lateral Ankle, Diabetic Foot Ulcer, Bang 1, (onset July 2019)-Wound Width (cm): 0.6 cm    Wound 08/19/19 # 1, Right Lateral Ankle, Diabetic Foot Ulcer, Bang 1, (onset July 2019)-Wound Depth (cm): 0.2 cm    Pain Control: Anesthetic  Anesthetic: 4% Lidocaine Cream         LABS  Lab Results   Component Value Date    LABA1C 10.7 04/10/2019         Assessment:     Patient Active Problem List   Diagnosis Code    HCAP (healthcare-associated pneumonia) J18.9    Acute hypoxemic respiratory failure ([facility] J96.01    MARCOS (acute kidney injury) ([facility] N17.9    COPD exacerbation ([facility]) J44.1    Hypertension I10    Hypercholesteremia E78.00    GERD (gastroesophageal reflux disease) K21.9    Diabetes mellitus ([facility] E11.9    Pneumonia due to organism J18.9    Leukocytosis D72.829    Pleural effusion J90       Assessment of today's active condition(s): diabetic foot ulcer right lateral ankle, S/P left hallux amputation, PAD, diabetes mellitus. Factors contributing to occurrence and/or persistence of the chronic ulcer include diabetes and arterial insufficiency. Sharp debridement is not indicated today, based upon the exam findings in the ulcer(s) above.          Discharge plan:     Treatment in the wound care center today: Wound 08/19/19 # 1, Right Lateral Ankle, Diabetic Foot Ulcer, Bang 1, (onset July 2019)-Dressing/Treatment: Other (comment)(polymem ag, mepilex

## 2020-02-10 ENCOUNTER — HOSPITAL ENCOUNTER (OUTPATIENT)
Dept: WOUND CARE | Age: 85
Discharge: HOME | End: 2020-02-10
Payer: MEDICARE

## 2020-02-10 VITALS
RESPIRATION RATE: 16 BRPM | WEIGHT: 169 LBS | DIASTOLIC BLOOD PRESSURE: 72 MMHG | HEART RATE: 70 BPM | SYSTOLIC BLOOD PRESSURE: 144 MMHG | HEIGHT: 68 IN | BODY MASS INDEX: 25.61 KG/M2 | TEMPERATURE: 97.6 F

## 2020-02-10 PROCEDURE — 99213 OFFICE O/P EST LOW 20 MIN: CPT

## 2020-02-10 RX ORDER — LIDOCAINE 40 MG/G
CREAM TOPICAL ONCE
Status: DISCONTINUED | OUTPATIENT
Start: 2020-02-10 | End: 2020-02-11 | Stop reason: HOSPADM

## 2020-02-10 ASSESSMENT — PAIN SCALES - GENERAL
PAINLEVEL_OUTOF10: 0
PAINLEVEL_OUTOF10: 6

## 2020-02-10 ASSESSMENT — PAIN DESCRIPTION - ORIENTATION: ORIENTATION: RIGHT

## 2020-02-10 ASSESSMENT — PAIN DESCRIPTION - PAIN TYPE: TYPE: CHRONIC PAIN

## 2020-02-10 ASSESSMENT — PAIN DESCRIPTION - ONSET: ONSET: ON-GOING

## 2020-02-10 ASSESSMENT — PAIN - FUNCTIONAL ASSESSMENT: PAIN_FUNCTIONAL_ASSESSMENT: PREVENTS OR INTERFERES SOME ACTIVE ACTIVITIES AND ADLS

## 2020-02-10 ASSESSMENT — PAIN DESCRIPTION - PROGRESSION: CLINICAL_PROGRESSION: NOT CHANGED

## 2020-02-10 ASSESSMENT — PAIN DESCRIPTION - LOCATION: LOCATION: FOOT

## 2020-02-10 ASSESSMENT — PAIN DESCRIPTION - DESCRIPTORS: DESCRIPTORS: ACHING;CRAMPING

## 2020-02-10 ASSESSMENT — PAIN DESCRIPTION - FREQUENCY: FREQUENCY: INTERMITTENT

## 2020-03-02 ENCOUNTER — HOSPITAL ENCOUNTER (OUTPATIENT)
Dept: WOUND CARE | Age: 85
Discharge: HOME | End: 2020-03-02
Payer: MEDICARE

## 2020-03-02 VITALS
HEART RATE: 71 BPM | SYSTOLIC BLOOD PRESSURE: 142 MMHG | HEIGHT: 68 IN | DIASTOLIC BLOOD PRESSURE: 80 MMHG | TEMPERATURE: 98.3 F | RESPIRATION RATE: 16 BRPM | WEIGHT: 171 LBS | BODY MASS INDEX: 25.91 KG/M2

## 2020-03-02 PROCEDURE — 11042 DBRDMT SUBQ TIS 1ST 20SQCM/<: CPT

## 2020-03-02 RX ORDER — GABAPENTIN 100 MG/1
300 CAPSULE ORAL 2 TIMES DAILY
Status: ON HOLD | COMMUNITY
End: 2020-08-17 | Stop reason: HOSPADM

## 2020-03-02 RX ORDER — LIDOCAINE 40 MG/G
CREAM TOPICAL ONCE
Status: DISCONTINUED | OUTPATIENT
Start: 2020-03-02 | End: 2020-03-03 | Stop reason: HOSPADM

## 2020-03-02 ASSESSMENT — PAIN SCALES - GENERAL: PAINLEVEL_OUTOF10: 7

## 2020-03-02 ASSESSMENT — PAIN DESCRIPTION - FREQUENCY: FREQUENCY: CONTINUOUS

## 2020-03-02 ASSESSMENT — PAIN DESCRIPTION - LOCATION: LOCATION: FOOT

## 2020-03-02 ASSESSMENT — PAIN DESCRIPTION - PROGRESSION: CLINICAL_PROGRESSION: NOT CHANGED

## 2020-03-02 ASSESSMENT — PAIN - FUNCTIONAL ASSESSMENT: PAIN_FUNCTIONAL_ASSESSMENT: PREVENTS OR INTERFERES SOME ACTIVE ACTIVITIES AND ADLS

## 2020-03-02 ASSESSMENT — PAIN DESCRIPTION - ORIENTATION: ORIENTATION: RIGHT

## 2020-03-02 ASSESSMENT — PAIN DESCRIPTION - PAIN TYPE: TYPE: CHRONIC PAIN

## 2020-03-02 ASSESSMENT — PAIN DESCRIPTION - ONSET: ONSET: ON-GOING

## 2020-03-02 ASSESSMENT — PAIN DESCRIPTION - DESCRIPTORS: DESCRIPTORS: ACHING

## 2020-03-02 NOTE — PLAN OF CARE
Pt to the [facility] for follow up appointment. Wound debrided today per Dr Michele Bullock and is measuring smaller. Pt to continue with collagen to wound. Pt to follow up in the [facility] in 2 weeks. Discharge instructions reviewed with patient, all questions answered, copy given to patient. Dressings were applied to all wounds per M.D. Instructions at this visit.

## 2020-03-09 NOTE — PROGRESS NOTES
[facility] Progress Note      Cadence Pinzon     : 1934    DATE OF VISIT:  3/2/2020    Subjective:     Cadence Pinzon is a 80 y.o. male who has a chief complaint of a diabetic ulcer located on the bilateral foot. Significant symptoms or pertinent ulcer history since last visit: States still doing well. Current complaint of pain in this ulcer? No.        Mr. Jewell Islas has a past medical history of AAA (abdominal aortic aneurysm) ([address] Acute constipation, Acute gastrointestinal hemorrhage, Anemia, Bladder CA in situ, BPH (benign prostatic hyperplasia), Cancer ([address] CHF (congestive heart failure) ([address] Constipation, COPD (chronic obstructive pulmonary disease) ([address] Coronary arteriosclerosis, Current mild episode of major depressive disorder ([address] Depression, Diabetes mellitus ([address] Diabetic neuropathy ([address] Diverticulitis of intestine w/o perforation or abscess with bleeding, GERD (gastroesophageal reflux disease), GI hemorrhage, Hypercholesteremia, Hypertension, Hypoaldosteronism ([address] 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 He has a past surgical history that includes eye surgery; Pacemaker insertion; Cardiac surgery; pacemaker placement; Colonoscopy (2019); Colonoscopy (N/A, 2019); Colonoscopy (2019); Colonoscopy (N/A, 2019); Toe amputation (Left, 10/10/2019); and Toe amputation (Left, 10/10/2019). His family history includes Cancer in his brother, sister, sister, and sister; Heart Attack in his father and mother; Tuberculosis in his mother. Mr. Jewell Islas reports that he has quit smoking. He has never used smokeless tobacco. He reports that he does not drink alcohol or use drugs.     His current medication list consists of Albuterol, Alogliptin Benzoate, DULoxetine, Nutritional Supplements, Protein, acetaminophen, aspirin, atorvastatin, ferrous sulfate, finasteride, fluticasone-salmeterol, furosemide, gabapentin, glipiZIDE, insulin glargine, insulin lispro, metoprolol succinate, omeprazole, polyethylene glycol, triamcinolone, and vitamin D. Allergies: No known allergies    Pertinent items from the review of systems are discussed in the HPI; the remainder of the ROS was reviewed and is negative. Denies nausea, vomiting, fevers, chills, shortness of breath or chest pain. Objective:     BP (!) 142/80   Pulse 71   Temp 98.3 °F (36.8 °C) (Oral)   Resp 16   Ht 5' 8\" (1.727 m)   Wt 171 lb (77.6 kg)   BMI 26.00 kg/m²      General Appearance: alert and oriented to person, place and time, well developed and well- nourished, in no acute distress  Skin: warm and dry, no rash or erythema  Head: normocephalic and atraumatic  Eyes: pupils equal, round, and reactive to light, extraocular eye movements intact, conjunctivae normal  ENT: tympanic membrane, external ear and ear canal normal bilaterally, nose without deformity, nasal mucosa and turbinates normal without polyps  Neck: supple and non-tender without mass, no thyromegaly or thyroid nodules, no cervical lymphadenopathy  Pulmonary/Chest: clear to auscultation bilaterally- no wheezes, rales or rhonchi, normal air movement, no respiratory distress  Cardiovascular: normal rate, regular rhythm, normal S1 and S2, no murmurs, rubs, clicks, or gallops, distal pulses intact, no carotid bruits  Abdomen: soft, non-tender, non-distended, normal bowel sounds, no masses or organomegaly.     Dorsalis pedis pulse left dopplerable  Posterior tibial pulse left dopplerable  Dorsalis pedis pulse right dopplerable  Posterior tibial pulse right dopplerable  Protective sensation absent bilateral LE      Ulcer on the right lateral ankle with mild fibrotic tissue, red granulation tissue, mild serous drainage, no collagen dry dressing). Home treatment: good handwashing before and after any dressing changes. Cleanse ulcer with saline or soap & water before dressing change. May use Vaseline (petrolatum), Aquaphor, Aveeno, CeraVe, Cetaphil, Eucerin, Lubriderm, etc for dry skin. Dressing type for home: Collagen and dry dressing to wound on right ankle daily. Written discharge instructions given to patient. Offload ulcer(s) as directed. Elevate leg(s) as directed. Follow up in 2 weeks. Continue to follow up with Dr. Nadai Beaver as per them.          Electronically signed by Kevin Araujo DPM on 3/9/2020 at 4:33 PM.

## 2020-03-16 ENCOUNTER — HOSPITAL ENCOUNTER (OUTPATIENT)
Dept: WOUND CARE | Age: 85
Discharge: HOME | End: 2020-03-16
Payer: MEDICARE

## 2020-08-09 ENCOUNTER — APPOINTMENT (OUTPATIENT)
Dept: GENERAL RADIOLOGY | Age: 85
DRG: 177 | End: 2020-08-09
Payer: MEDICARE

## 2020-08-09 ENCOUNTER — HOSPITAL ENCOUNTER (INPATIENT)
Age: 85
LOS: 7 days | Discharge: HOSPICE | DRG: 177 | End: 2020-08-17
Attending: EMERGENCY MEDICINE | Admitting: INTERNAL MEDICINE
Payer: MEDICARE

## 2020-08-09 LAB
A/G RATIO: 1 (ref 1.1–2.2)
ALBUMIN SERPL-MCNC: 4 G/DL (ref 3.4–5)
ALP BLD-CCNC: 126 U/L (ref 40–129)
ALT SERPL-CCNC: 15 U/L (ref 10–40)
ANION GAP SERPL CALCULATED.3IONS-SCNC: 11 MMOL/L (ref 3–16)
AST SERPL-CCNC: 16 U/L (ref 15–37)
BASOPHILS ABSOLUTE: 0.2 K/UL (ref 0–0.2)
BASOPHILS RELATIVE PERCENT: 1.1 %
BILIRUB SERPL-MCNC: 1.4 MG/DL (ref 0–1)
BUN BLDV-MCNC: 32 MG/DL (ref 7–20)
CALCIUM SERPL-MCNC: 9.3 MG/DL (ref 8.3–10.6)
CHLORIDE BLD-SCNC: 100 MMOL/L (ref 99–110)
CO2: 28 MMOL/L (ref 21–32)
CREAT SERPL-MCNC: 1.8 MG/DL (ref 0.8–1.3)
D DIMER: 744 NG/ML DDU (ref 0–229)
EOSINOPHILS ABSOLUTE: 0.1 K/UL (ref 0–0.6)
EOSINOPHILS RELATIVE PERCENT: 0.6 %
FIBRINOGEN: 637 MG/DL (ref 200–397)
GFR AFRICAN AMERICAN: 43
GFR NON-AFRICAN AMERICAN: 36
GLOBULIN: 4 G/DL
GLUCOSE BLD-MCNC: 240 MG/DL (ref 70–99)
HCT VFR BLD CALC: 43.6 % (ref 40.5–52.5)
HEMOGLOBIN: 13.7 G/DL (ref 13.5–17.5)
LACTIC ACID: 1.9 MMOL/L (ref 0.4–2)
LYMPHOCYTES ABSOLUTE: 1.6 K/UL (ref 1–5.1)
LYMPHOCYTES RELATIVE PERCENT: 8.4 %
MCH RBC QN AUTO: 28.4 PG (ref 26–34)
MCHC RBC AUTO-ENTMCNC: 31.4 G/DL (ref 31–36)
MCV RBC AUTO: 90.4 FL (ref 80–100)
MONOCYTES ABSOLUTE: 1.5 K/UL (ref 0–1.3)
MONOCYTES RELATIVE PERCENT: 8 %
NEUTROPHILS ABSOLUTE: 15.5 K/UL (ref 1.7–7.7)
NEUTROPHILS RELATIVE PERCENT: 81.9 %
PDW BLD-RTO: 15.8 % (ref 12.4–15.4)
PLATELET # BLD: 323 K/UL (ref 135–450)
PMV BLD AUTO: 7.8 FL (ref 5–10.5)
POTASSIUM REFLEX MAGNESIUM: 5.5 MMOL/L (ref 3.5–5.1)
PRO-BNP: 3539 PG/ML (ref 0–449)
PROCALCITONIN: 0.14 NG/ML (ref 0–0.15)
RBC # BLD: 4.83 M/UL (ref 4.2–5.9)
SODIUM BLD-SCNC: 139 MMOL/L (ref 136–145)
TOTAL PROTEIN: 8 G/DL (ref 6.4–8.2)
TROPONIN: 0.04 NG/ML
WBC # BLD: 18.9 K/UL (ref 4–11)

## 2020-08-09 PROCEDURE — 96375 TX/PRO/DX INJ NEW DRUG ADDON: CPT

## 2020-08-09 PROCEDURE — 71045 X-RAY EXAM CHEST 1 VIEW: CPT

## 2020-08-09 PROCEDURE — 2580000003 HC RX 258: Performed by: EMERGENCY MEDICINE

## 2020-08-09 PROCEDURE — 93005 ELECTROCARDIOGRAM TRACING: CPT | Performed by: EMERGENCY MEDICINE

## 2020-08-09 PROCEDURE — 84484 ASSAY OF TROPONIN QUANT: CPT

## 2020-08-09 PROCEDURE — 6370000000 HC RX 637 (ALT 250 FOR IP): Performed by: EMERGENCY MEDICINE

## 2020-08-09 PROCEDURE — 83605 ASSAY OF LACTIC ACID: CPT

## 2020-08-09 PROCEDURE — 86140 C-REACTIVE PROTEIN: CPT

## 2020-08-09 PROCEDURE — 80053 COMPREHEN METABOLIC PANEL: CPT

## 2020-08-09 PROCEDURE — U0003 INFECTIOUS AGENT DETECTION BY NUCLEIC ACID (DNA OR RNA); SEVERE ACUTE RESPIRATORY SYNDROME CORONAVIRUS 2 (SARS-COV-2) (CORONAVIRUS DISEASE [COVID-19]), AMPLIFIED PROBE TECHNIQUE, MAKING USE OF HIGH THROUGHPUT TECHNOLOGIES AS DESCRIBED BY CMS-2020-01-R: HCPCS

## 2020-08-09 PROCEDURE — 96365 THER/PROPH/DIAG IV INF INIT: CPT

## 2020-08-09 PROCEDURE — 84145 PROCALCITONIN (PCT): CPT

## 2020-08-09 PROCEDURE — 85384 FIBRINOGEN ACTIVITY: CPT

## 2020-08-09 PROCEDURE — 6360000002 HC RX W HCPCS: Performed by: EMERGENCY MEDICINE

## 2020-08-09 PROCEDURE — 85379 FIBRIN DEGRADATION QUANT: CPT

## 2020-08-09 PROCEDURE — 83615 LACTATE (LD) (LDH) ENZYME: CPT

## 2020-08-09 PROCEDURE — 99285 EMERGENCY DEPT VISIT HI MDM: CPT

## 2020-08-09 PROCEDURE — 82728 ASSAY OF FERRITIN: CPT

## 2020-08-09 PROCEDURE — 36415 COLL VENOUS BLD VENIPUNCTURE: CPT

## 2020-08-09 PROCEDURE — 83880 ASSAY OF NATRIURETIC PEPTIDE: CPT

## 2020-08-09 PROCEDURE — 96366 THER/PROPH/DIAG IV INF ADDON: CPT

## 2020-08-09 PROCEDURE — 85025 COMPLETE CBC W/AUTO DIFF WBC: CPT

## 2020-08-09 RX ORDER — IPRATROPIUM BROMIDE AND ALBUTEROL SULFATE 2.5; .5 MG/3ML; MG/3ML
1 SOLUTION RESPIRATORY (INHALATION) ONCE
Status: COMPLETED | OUTPATIENT
Start: 2020-08-09 | End: 2020-08-09

## 2020-08-09 RX ORDER — SODIUM CHLORIDE 9 MG/ML
1000 INJECTION, SOLUTION INTRAVENOUS CONTINUOUS
Status: DISCONTINUED | OUTPATIENT
Start: 2020-08-09 | End: 2020-08-10

## 2020-08-09 RX ORDER — ONDANSETRON 2 MG/ML
4 INJECTION INTRAMUSCULAR; INTRAVENOUS ONCE
Status: COMPLETED | OUTPATIENT
Start: 2020-08-09 | End: 2020-08-09

## 2020-08-09 RX ORDER — ONDANSETRON 2 MG/ML
INJECTION INTRAMUSCULAR; INTRAVENOUS
Status: DISPENSED
Start: 2020-08-09 | End: 2020-08-10

## 2020-08-09 RX ADMIN — DEXTROSE MONOHYDRATE 500 MG: 50 INJECTION, SOLUTION INTRAVENOUS at 22:19

## 2020-08-09 RX ADMIN — SODIUM CHLORIDE 1000 ML: 9 INJECTION, SOLUTION INTRAVENOUS at 21:12

## 2020-08-09 RX ADMIN — WATER 1 G: 1 INJECTION INTRAMUSCULAR; INTRAVENOUS; SUBCUTANEOUS at 22:19

## 2020-08-09 RX ADMIN — IPRATROPIUM BROMIDE AND ALBUTEROL SULFATE 1 AMPULE: .5; 3 SOLUTION RESPIRATORY (INHALATION) at 21:11

## 2020-08-09 RX ADMIN — ONDANSETRON HYDROCHLORIDE 4 MG: 2 INJECTION, SOLUTION INTRAMUSCULAR; INTRAVENOUS at 23:26

## 2020-08-10 PROBLEM — J18.9 MULTIFOCAL PNEUMONIA: Status: ACTIVE | Noted: 2020-08-10

## 2020-08-10 LAB
C-REACTIVE PROTEIN: 82.1 MG/L (ref 0–5.1)
EKG ATRIAL RATE: 77 BPM
EKG DIAGNOSIS: NORMAL
EKG P AXIS: 109 DEGREES
EKG Q-T INTERVAL: 418 MS
EKG QRS DURATION: 130 MS
EKG QTC CALCULATION (BAZETT): 451 MS
EKG R AXIS: 270 DEGREES
EKG T AXIS: 77 DEGREES
EKG VENTRICULAR RATE: 70 BPM
FERRITIN: 133.9 NG/ML (ref 30–400)
GLUCOSE BLD-MCNC: 182 MG/DL (ref 70–99)
GLUCOSE BLD-MCNC: 229 MG/DL (ref 70–99)
GLUCOSE BLD-MCNC: 232 MG/DL (ref 70–99)
GLUCOSE BLD-MCNC: 261 MG/DL (ref 70–99)
LACTATE DEHYDROGENASE: 225 U/L (ref 100–190)
PERFORMED ON: ABNORMAL
TROPONIN: 0.02 NG/ML
TROPONIN: 0.03 NG/ML

## 2020-08-10 PROCEDURE — 6370000000 HC RX 637 (ALT 250 FOR IP): Performed by: PHYSICIAN ASSISTANT

## 2020-08-10 PROCEDURE — 94640 AIRWAY INHALATION TREATMENT: CPT

## 2020-08-10 PROCEDURE — 2580000003 HC RX 258: Performed by: INTERNAL MEDICINE

## 2020-08-10 PROCEDURE — 93010 ELECTROCARDIOGRAM REPORT: CPT | Performed by: INTERNAL MEDICINE

## 2020-08-10 PROCEDURE — 84484 ASSAY OF TROPONIN QUANT: CPT

## 2020-08-10 PROCEDURE — 1200000000 HC SEMI PRIVATE

## 2020-08-10 PROCEDURE — 6370000000 HC RX 637 (ALT 250 FOR IP): Performed by: INTERNAL MEDICINE

## 2020-08-10 PROCEDURE — 6360000002 HC RX W HCPCS: Performed by: PHYSICIAN ASSISTANT

## 2020-08-10 PROCEDURE — 36415 COLL VENOUS BLD VENIPUNCTURE: CPT

## 2020-08-10 PROCEDURE — 94761 N-INVAS EAR/PLS OXIMETRY MLT: CPT

## 2020-08-10 PROCEDURE — 2580000003 HC RX 258

## 2020-08-10 PROCEDURE — 2580000003 HC RX 258: Performed by: PHYSICIAN ASSISTANT

## 2020-08-10 PROCEDURE — 6360000002 HC RX W HCPCS: Performed by: INTERNAL MEDICINE

## 2020-08-10 PROCEDURE — 2700000000 HC OXYGEN THERAPY PER DAY

## 2020-08-10 RX ORDER — ALBUTEROL SULFATE 90 UG/1
2 AEROSOL, METERED RESPIRATORY (INHALATION) EVERY 4 HOURS PRN
Status: DISCONTINUED | OUTPATIENT
Start: 2020-08-10 | End: 2020-08-17 | Stop reason: HOSPADM

## 2020-08-10 RX ORDER — ASPIRIN 81 MG/1
81 TABLET, CHEWABLE ORAL DAILY
Status: DISCONTINUED | OUTPATIENT
Start: 2020-08-10 | End: 2020-08-17 | Stop reason: HOSPADM

## 2020-08-10 RX ORDER — PANTOPRAZOLE SODIUM 40 MG/1
40 TABLET, DELAYED RELEASE ORAL
Status: DISCONTINUED | OUTPATIENT
Start: 2020-08-11 | End: 2020-08-17 | Stop reason: HOSPADM

## 2020-08-10 RX ORDER — FUROSEMIDE 20 MG/1
20 TABLET ORAL DAILY
Status: DISCONTINUED | OUTPATIENT
Start: 2020-08-10 | End: 2020-08-16

## 2020-08-10 RX ORDER — ALBUTEROL SULFATE 90 UG/1
2 AEROSOL, METERED RESPIRATORY (INHALATION)
Status: DISCONTINUED | OUTPATIENT
Start: 2020-08-10 | End: 2020-08-10

## 2020-08-10 RX ORDER — BUDESONIDE AND FORMOTEROL FUMARATE DIHYDRATE 80; 4.5 UG/1; UG/1
2 AEROSOL RESPIRATORY (INHALATION) 2 TIMES DAILY
Status: DISCONTINUED | OUTPATIENT
Start: 2020-08-10 | End: 2020-08-17 | Stop reason: HOSPADM

## 2020-08-10 RX ORDER — FLUOXETINE 10 MG/1
10 CAPSULE ORAL NIGHTLY
Status: DISCONTINUED | OUTPATIENT
Start: 2020-08-10 | End: 2020-08-15

## 2020-08-10 RX ORDER — TRIAMCINOLONE ACETONIDE 1 MG/G
1 CREAM TOPICAL DAILY
Status: ON HOLD | COMMUNITY
End: 2020-08-17 | Stop reason: HOSPADM

## 2020-08-10 RX ORDER — AMIODARONE HYDROCHLORIDE 200 MG/1
100 TABLET ORAL DAILY
Status: DISCONTINUED | OUTPATIENT
Start: 2020-08-10 | End: 2020-08-17 | Stop reason: HOSPADM

## 2020-08-10 RX ORDER — NICOTINE POLACRILEX 4 MG
15 LOZENGE BUCCAL PRN
Status: DISCONTINUED | OUTPATIENT
Start: 2020-08-10 | End: 2020-08-17 | Stop reason: HOSPADM

## 2020-08-10 RX ORDER — IPRATROPIUM BROMIDE AND ALBUTEROL SULFATE 2.5; .5 MG/3ML; MG/3ML
1 SOLUTION RESPIRATORY (INHALATION)
Status: DISCONTINUED | OUTPATIENT
Start: 2020-08-10 | End: 2020-08-10 | Stop reason: CLARIF

## 2020-08-10 RX ORDER — FLUOXETINE 10 MG/1
10 CAPSULE ORAL NIGHTLY
Status: ON HOLD | COMMUNITY
End: 2020-08-17 | Stop reason: HOSPADM

## 2020-08-10 RX ORDER — SODIUM CHLORIDE 0.9 % (FLUSH) 0.9 %
10 SYRINGE (ML) INJECTION EVERY 12 HOURS SCHEDULED
Status: DISCONTINUED | OUTPATIENT
Start: 2020-08-10 | End: 2020-08-17 | Stop reason: HOSPADM

## 2020-08-10 RX ORDER — AMIODARONE HYDROCHLORIDE 100 MG/1
100 TABLET ORAL DAILY
Status: ON HOLD | COMMUNITY
End: 2020-08-17 | Stop reason: HOSPADM

## 2020-08-10 RX ORDER — FINASTERIDE 5 MG/1
5 TABLET, FILM COATED ORAL DAILY
Status: DISCONTINUED | OUTPATIENT
Start: 2020-08-10 | End: 2020-08-17 | Stop reason: HOSPADM

## 2020-08-10 RX ORDER — DEXTROSE MONOHYDRATE 25 G/50ML
12.5 INJECTION, SOLUTION INTRAVENOUS PRN
Status: DISCONTINUED | OUTPATIENT
Start: 2020-08-10 | End: 2020-08-17 | Stop reason: HOSPADM

## 2020-08-10 RX ORDER — GABAPENTIN 300 MG/1
300 CAPSULE ORAL 2 TIMES DAILY
Status: DISCONTINUED | OUTPATIENT
Start: 2020-08-10 | End: 2020-08-15

## 2020-08-10 RX ORDER — SODIUM CHLORIDE 0.9 % (FLUSH) 0.9 %
10 SYRINGE (ML) INJECTION PRN
Status: DISCONTINUED | OUTPATIENT
Start: 2020-08-10 | End: 2020-08-17 | Stop reason: HOSPADM

## 2020-08-10 RX ORDER — PROMETHAZINE HYDROCHLORIDE 25 MG/1
12.5 TABLET ORAL EVERY 6 HOURS PRN
Status: DISCONTINUED | OUTPATIENT
Start: 2020-08-10 | End: 2020-08-17 | Stop reason: HOSPADM

## 2020-08-10 RX ORDER — SODIUM CHLORIDE 9 MG/ML
INJECTION, SOLUTION INTRAVENOUS
Status: COMPLETED
Start: 2020-08-10 | End: 2020-08-10

## 2020-08-10 RX ORDER — DULOXETIN HYDROCHLORIDE 60 MG/1
60 CAPSULE, DELAYED RELEASE ORAL DAILY
Status: DISCONTINUED | OUTPATIENT
Start: 2020-08-10 | End: 2020-08-15

## 2020-08-10 RX ORDER — DEXTROSE MONOHYDRATE 50 MG/ML
100 INJECTION, SOLUTION INTRAVENOUS PRN
Status: DISCONTINUED | OUTPATIENT
Start: 2020-08-10 | End: 2020-08-17 | Stop reason: HOSPADM

## 2020-08-10 RX ORDER — METOPROLOL SUCCINATE 25 MG/1
12.5 TABLET, EXTENDED RELEASE ORAL DAILY
Status: DISCONTINUED | OUTPATIENT
Start: 2020-08-10 | End: 2020-08-17 | Stop reason: HOSPADM

## 2020-08-10 RX ORDER — ATORVASTATIN CALCIUM 40 MG/1
40 TABLET, FILM COATED ORAL DAILY
Status: DISCONTINUED | OUTPATIENT
Start: 2020-08-10 | End: 2020-08-17 | Stop reason: HOSPADM

## 2020-08-10 RX ORDER — ONDANSETRON 2 MG/ML
4 INJECTION INTRAMUSCULAR; INTRAVENOUS EVERY 6 HOURS PRN
Status: DISCONTINUED | OUTPATIENT
Start: 2020-08-10 | End: 2020-08-17 | Stop reason: HOSPADM

## 2020-08-10 RX ORDER — INSULIN GLARGINE 100 [IU]/ML
20 INJECTION, SOLUTION SUBCUTANEOUS NIGHTLY
Status: DISCONTINUED | OUTPATIENT
Start: 2020-08-10 | End: 2020-08-17 | Stop reason: HOSPADM

## 2020-08-10 RX ADMIN — GABAPENTIN 300 MG: 300 CAPSULE ORAL at 21:45

## 2020-08-10 RX ADMIN — FINASTERIDE 5 MG: 5 TABLET, FILM COATED ORAL at 14:30

## 2020-08-10 RX ADMIN — METOPROLOL SUCCINATE 12.5 MG: 25 TABLET, EXTENDED RELEASE ORAL at 14:35

## 2020-08-10 RX ADMIN — GABAPENTIN 300 MG: 300 CAPSULE ORAL at 14:30

## 2020-08-10 RX ADMIN — Medication 10 ML: at 21:46

## 2020-08-10 RX ADMIN — Medication 10 ML: at 09:00

## 2020-08-10 RX ADMIN — SODIUM CHLORIDE 250 ML: 9 INJECTION, SOLUTION INTRAVENOUS at 10:26

## 2020-08-10 RX ADMIN — Medication 2 PUFF: at 21:43

## 2020-08-10 RX ADMIN — INSULIN GLARGINE 20 UNITS: 100 INJECTION, SOLUTION SUBCUTANEOUS at 21:46

## 2020-08-10 RX ADMIN — FUROSEMIDE 20 MG: 20 TABLET ORAL at 14:30

## 2020-08-10 RX ADMIN — ATORVASTATIN CALCIUM 40 MG: 40 TABLET, FILM COATED ORAL at 14:30

## 2020-08-10 RX ADMIN — ENOXAPARIN SODIUM 30 MG: 30 INJECTION SUBCUTANEOUS at 09:00

## 2020-08-10 RX ADMIN — CEFEPIME 2 G: 2 INJECTION, POWDER, FOR SOLUTION INTRAMUSCULAR; INTRAVENOUS at 10:27

## 2020-08-10 RX ADMIN — INSULIN LISPRO 2 UNITS: 100 INJECTION, SOLUTION INTRAVENOUS; SUBCUTANEOUS at 17:04

## 2020-08-10 RX ADMIN — DULOXETINE HYDROCHLORIDE 60 MG: 60 CAPSULE, DELAYED RELEASE ORAL at 14:30

## 2020-08-10 RX ADMIN — VANCOMYCIN HYDROCHLORIDE 1.5 G: 10 INJECTION, POWDER, LYOPHILIZED, FOR SOLUTION INTRAVENOUS at 17:04

## 2020-08-10 RX ADMIN — AMIODARONE HYDROCHLORIDE 100 MG: 200 TABLET ORAL at 14:29

## 2020-08-10 RX ADMIN — CEFEPIME 2 G: 2 INJECTION, POWDER, FOR SOLUTION INTRAMUSCULAR; INTRAVENOUS at 21:46

## 2020-08-10 RX ADMIN — ASPIRIN 81 MG CHEWABLE TABLET 81 MG: 81 TABLET CHEWABLE at 14:30

## 2020-08-10 RX ADMIN — FLUOXETINE 10 MG: 10 CAPSULE ORAL at 21:45

## 2020-08-10 NOTE — ED NOTES
EMS @ bedside, report provided to EMS team, all questions addressed.       Matilde Barrera RN  08/09/20 0163

## 2020-08-10 NOTE — PROGRESS NOTES
RESPIRATORY THERAPY ASSESSMENT    Name:  Jeffy [facility] Record Number:  [id number]  Age: 80 y.o. Gender: male  : 1934  Today's Date:  8/10/2020  Room:  [facility]80228-01    Assessment     Is the patient being admitted for a COPD or Asthma exacerbation? No   (If yes the patient will be seen every 4 hours for the first 24 hours and then reassessed)    Patient Admission Diagnosis      Allergies  Allergies   Allergen Reactions    No Known Allergies        Minimum Predicted Vital Capacity:               Actual Vital Capacity:                    Pulmonary History:COPD and CHF/Pulmonary Edema  Home Oxygen Therapy:  2 liters/min via nasal cannula  Home Respiratory Therapy:Salmeterol/Fluticasone Propionate   Current Respiratory Therapy:  duoneb q4wa          Respiratory Severity Index(RSI)   Patients with orders for inhalation medications, oxygen, or any therapeutic treatment modality will be placed on Respiratory Protocol. They will be assessed with the first treatment and at least every 72 hours thereafter. The following severity scale will be used to determine frequency of treatment intervention.     Smoking History: Pulmonary Disease or Smoking History, Greater than 15 pack year = 2    Social History  Social History     Tobacco Use    Smoking status: Former Smoker    Smokeless tobacco: Never Used    Tobacco comment: prior to    Substance Use Topics    Alcohol use: No    Drug use: No       Recent Surgical History: None = 0  Past Surgical History  Past Surgical History:   Procedure Laterality Date    CARDIAC SURGERY      aortocoronary bypass    COLONOSCOPY  2019    Incomplete colonoscopy; Poor prep    COLONOSCOPY N/A 2019    COLON W/ANES. (8:00) (DAUGHTER,JANIA, IS POA, WILL BE HERE FOR PROCEDURE) performed by German Parra DO at [facility]   2019    colon;    COLONOSCOPY N/A 2019    COLONOSCOPY POLYPECTOMY SNARE/COLD BIOPSY performed by Qi Diallo, DO at [address] [facility]      PACEMAKER PLACEMENT      TOE AMPUTATION Left 10/10/2019    PARTIAL FOOT AMPUTATION (Left great toe)    TOE AMPUTATION Left 10/10/2019    PARTIAL FOOT AMPUTATION performed by Erin Sarabia DPM at [facility] OR       Level of Consciousness: Alert, Oriented, and Cooperative = 0    Level of Activity: Mostly sedentary, minimal walking = 2    Respiratory Pattern: Dyspnea with exertion;Irregular pattern;or RR less than 6 = 2    Breath Sounds: Absent bilaterally and/or with wheezes = 3    Sputum   ,  ,    Cough: Strong, spontaneous, non-productive = 0    Vital Signs   /67   Pulse 69   Temp 98.7 °F (37.1 °C) (Oral)   Resp 20   Ht 5' 8\" (1.727 m)   Wt 191 lb (86.6 kg)   SpO2 94%   BMI 29.04 kg/m²   SPO2 (COPD values may differ): 88-89% on room air or greater than 92% on FiO2 28- 35% = 2    Peak Flow (asthma only): not applicable = 0    RSI: 21-86 = Q6H or QID and Q4HPRN for dyspnea        Plan       Goals: mobilize retained secretions, volume expansion and improve oxygenation    Patient/caregiver was educated on the proper method of use for Respiratory Care Devices:  No:       Level of patient/caregiver understanding able to:   ? Verbalize understanding   ? Demonstrate understanding       ? Teach back        ? Needs reinforcement       ? No available caregiver               ? Other:     Response to education:  no treatment given     Is patient being placed on Home Treatment Regimen? No     Does the patient have everything they need prior to discharge? NA     Comments: pt assessed and chart reviewed    Plan of Care: albuterol/atrovent q4wa    Electronically signed by Macy Tanner RCP on 8/10/2020 at 5:03 AM    Respiratory Protocol Guidelines     1. Assessment and treatment by Respiratory Therapy will be initiated for medication and therapeutic interventions upon initiation of aerosolized medication.   2. Physician will be contacted for respiratory rate (RR) greater than 35 breaths per minute. Therapy will be held for heart rate (HR) greater than 140 beats per minute, pending direction from physician. 3. Bronchodilators will be administered via Metered Dose Inhaler (MDI) with spacer when the following criteria are met:  a. Alert and cooperative     b. HR < 140 bpm  c. RR < 30 bpm                d. Can demonstrate a 2-3 second inspiratory hold  4. Bronchodilators will be administered via Hand Held Nebulizer JAV[facility]) to patients when ANY of the following criteria are met  a. Incognizant or uncooperative          b. Patients treated with HHN at Home        c. Unable to demonstrate proper use of MDI with spacer     d. RR > 30 bpm   5. Bronchodilators will be delivered via Metered Dose Inhaler (MDI), HHN, Aerogen to intubated patients on mechanical ventilation. 6. Inhalation medication orders will be delivered and/or substituted as outlined below. Aerosolized Medications Ordering and Administration Guidelines:    1. All Medications will be ordered by a physician, and their frequency and/or modality will be adjusted as defined by the patients Respiratory Severity Index (RSI) score. 2. If the patient does not have documented COPD, consider discontinuing anticholinergics when RSI is less than 9.  3. If the bronchospasm worsens (increased RSI), then the bronchodilator frequency can be increased to a maximum of every 4 hours. If greater than every 4 hours is required, the physician will be contacted. 4. If the bronchospasm improves, the frequency of the bronchodilator can be decreased, based on the patient's RSI, but not less than home treatment regimen frequency. 5. Bronchodilator(s) will be discontinued if patient has a RSI less than 9 and has received no scheduled or as needed treatment for 72  Hrs. Patients Ordered on a Mucolytic Agent:    1. Must always be administered with a bronchodilator.     2. Discontinue if patient experiences worsened bronchospasm, or secretions have lessened to the point that the patient is able to clear them with a cough. Anti-inflammatory and Combination Medications:    1. If the patient lacks prior history of lung disease, is not using inhaled anti-inflammatory medication at home, and lacks wheezing by examination or by history for at least 24 hours, contact physician for possible discontinuation.

## 2020-08-10 NOTE — PROGRESS NOTES
Pharmacy Note  Vancomycin Consult    Homer Franco is a 80 y.o. male started on Vancomycin for PNA; consult received from [place], [place] to manage therapy. Also receiving the following antibiotics:Cefepime and Zithromax     Patient Active Problem List   Diagnosis    HCAP (healthcare-associated pneumonia)    Acute hypoxemic respiratory failure ([facility] [address]    MARCOS (acute kidney injury) ([facility]    COPD exacerbation (HCC)    Hypertension    Hypercholesteremia    GERD (gastroesophageal reflux disease)    Diabetes mellitus ([facility] [address]    Pneumonia due to organism    Leukocytosis    Pleural effusion    Multifocal pneumonia       Allergies:  No known allergies     Temp max:     Recent Labs     08/09/20 2050   BUN 32*       Recent Labs     08/09/20 2050   CREATININE 1.8*       Recent Labs     08/09/20 2050   WBC 18.9*         Intake/Output Summary (Last 24 hours) at 8/10/2020 1649  Last data filed at 8/10/2020 1428  Gross per 24 hour   Intake 837.92 ml   Output 800 ml   Net 37.92 ml       Culture Date      Source                       Results  NGTD    Ht Readings from Last 1 Encounters:   08/10/20 5' 8\" (1.727 m)        Wt Readings from Last 1 Encounters:   08/10/20 191 lb (86.6 kg)         Body mass index is 29.04 kg/m². Estimated Creatinine Clearance: 32 mL/min (A) (based on SCr of 1.8 mg/dL (H)). Goal Trough Level: 15-20 mcg/mL    Assessment/Plan:  Will initiate Vancomycin with a one time loading dose of 1500mg x1, random level at noon tomorrow  Thank you for the consult. Will continue to follow.   Rizwan Posada Pharm D 8/10/82421:50 PM  .

## 2020-08-10 NOTE — ED NOTES
Report called to Deana Garcia, spoke to [employer]. RN denies further questions. Awaiting transport at this time. End of shift report given to [facility]. RN denies further questions. Pt remains alert and oriented, no apparent distress. Vitals WNL. Care transferred at this time.      Analisa Redmond RN  08/09/20 3044

## 2020-08-10 NOTE — CARE COORDINATION
Case Management Assessment  Initial Evaluation    Date/Time of Evaluation: 8/10/2020 10:58 AM  Assessment Completed by: Haroon Alonzo    Patient Name: Hebert Fox  YOB: 1934  Diagnosis: Multifocal pneumonia [J18.9]  Date / Time: 8/9/2020  8:45 PM  Admission status/Date: 8/9/2020  Chart Reviewed: Yes      Patient Janessa Strauss: Yes - via telephone  Family Interviewed:  Yes Azar Rosario Cooper      Hospitalization in the last 30 days:  No    Contacts  :  Tommi Gaucher   Relationship to Patient: Liya Sorensen  Phone Number:  [phone]  Alternate Contact:     Relationship to Patient:     Phone Number:    Met with:    Current PCP [address]  Medicare  Precert required for SNF : Dorthea Manifold        3 night stay required - Y, N    ADLS  Support Systems: Family Members  Transportation: EMS transportation    Meal Preparation: per facility    Housing  Home Environment: [place] [address]  Steps: n/a  Plans to Return to Present Housing: Yes  Other Identified Issues: covid test pending    Mariaa Wilson  Currently active with [address] : No  Type of Home Care Services: None  Passport/Waiver : No  :                      Phone Number:    Passport/Waiver Services: n/a          Durable Medical Equipment   DME Provider: per facility  Equipment: per facility  Walker___Cane___RTS___ BSC___Shower Chair___  02__ at ____Liter(s)---Uses________HHN___ CPAP___ BiPap___ Hospital Bed___W/C____Other________      Has Home O2 in place on admit:  Prn at facility    If above answer is No ---is pt presently on O2 during hospitalization:  Yes  if yes CM to follow for potential DC O2 need  Informed of need to bring portable home O2 tank on day of discharge for nursing to connect prior to leaving:   Not Indicated  Verbalized agreement/Understanding:   Not Indicated    Community Service Affiliation  Dialysis:  No    · Name:  · Location  · Dialysis Schedule:  · Phone:   · Fax:     Outpatient PT/OT: No Cancer Center: No     CHF Clinic: No     Pulmonary Rehab: No  Pain Clinic: No  Community Mental Health: No    Wound Clinic: No     COVID SCREENING: Yes - pending from 8/9/2020       Other: n/a    The Plan for Transition of Care is related to the following treatment goals: return to [facility]    The Patient and/or patient representative pt and pt TOYA, Freidanadine Vosshty, was provided with a choice of provider and agrees   with the discharge plan. [x] Yes [] No    Freedom of choice list was provided with basic dialogue that supports the patient's individualized plan of care/goals, treatment preferences and shares the quality data associated with the providers. [x] Yes [] No    DISCHARGE PLAN: Reviewed chart and spoke with pt via telephone. Pt reports to live at the [facility] in [facility] and plans to return there at discharge. Spoke with Juan Pablo Pineda with [facility] who states that pt can return at discharge. CM also spoke with pt Amanda Chua @ [phone], who states that plan at discharge is for pt to return to [facility]. Filemon Tyler also wanted CM to relay to pt nurse today that pt can sometimes get aggressive or start laughing if he is given too much oxygen. Filemon Tyler states that she feels this is a concern and that it could be dangerous to his health if pt is over oxygenated. JOHN relayed this information to pt nurse, Jesus Greco, at this time. CM will cont to follow. Explained Case Management role/services.

## 2020-08-10 NOTE — PROGRESS NOTES
Progress Note    Admit Date:  8/9/2020    Admitted for Atypical PNA and COVID-19 rule out     Subjective:  Mr. Nellie Ceballos  Is stable and febrile. Home medications have been re-ordered     Patient was seen and evaluated by nocturnist provider with\ H&P completed on 8/10 and patient is currently in droplet + precautions, physical exam deferred for today, see H&P for PE documentation     Objective:   Vitals:    08/10/20 0818   BP: (!) 143/75   Pulse: 62   Resp: 18   Temp: 98 °F (36.7 °C)   SpO2: 96%       Intake/Output Summary (Last 24 hours) at 8/10/2020 1239  Last data filed at 8/10/2020 0657  Gross per 24 hour   Intake 597.92 ml   Output 400 ml   Net 197.92 ml       Physical Exam:  Physical Exam deferred. Patient had H&P completed on 8/10 and patient is currently in droplet + precautions     Scheduled Meds:   sodium chloride flush  10 mL Intravenous 2 times per day    enoxaparin  30 mg Subcutaneous Daily    [START ON 8/11/2020] azithromycin  500 mg Intravenous Q24H    cefepime  2 g Intravenous Q12H       Continuous Infusions:   sodium chloride Stopped (08/09/20 7569)       PRN Meds:  sodium chloride flush, promethazine **OR** ondansetron, albuterol sulfate HFA, ipratropium      Data:  CBC:   Recent Labs     08/09/20 2050   WBC 18.9*   HGB 13.7   HCT 43.6   MCV 90.4        BMP:   Recent Labs     08/09/20 2050      K 5.5*      CO2 28   BUN 32*   CREATININE 1.8*     LIVER PROFILE:   Recent Labs     08/09/20 2050   AST 16   ALT 15   BILITOT 1.4*   ALKPHOS 126     Pro-BNP  3,539High      Troponin  0. 04High       D-Dimer, Quant  744High      Procalcitonin  0.14      CULTURES  COVID-19: pending      RADIOLOGY  XR CHEST PORTABLE   Final Result   Multifocal left midlung consolidation, most consistent with pneumonia,   consider a bacterial origin. Small bilateral pleural effusions. Radiographic follow-up to resolution is recommended.              Assessment/Plan:  Acute hypoxic respiratory failure  - Does not use supplemental O2 at home   - Was requiring up to 4L NC--> nowon 1L   - Suspect 2/2 multifocal PNA, see below  - CAN NOT RULE OUT COVID-19   - DROPLET + PRECAUTIONS   - COVID-19 TESTING PENDING   - pulmonology consult     Multifocal PNA  - CXR shows bilateral effusions with multifocal PNA  - Concerns for gram neg organism with risk for MRSA (patient is from SNF), continue Cefepime/Azithromycin  - Would Add Vanc, pharmacy to dose   - PCT is WNL  - Pulmonology consult    COPD  - not reported as AE  - NO NEBULIZERS until COVID-19 is ruled out      Leukocytosis   - suspect 2/2 HCAP   - Trend      Hyperkalemia  - K+ on admission 5.5  - EKG without acute change    CKD Stage III   - Baseline creatinine ~1.6   - Creatinine on admission 1.8  - stable     Elevated troponin   - paced rhythm on EKG  - No acute CP complaints per chart  - may be related to CKD  - Continue tele monitor  - Trend troponin   - PRN EKG     DM2 with neuropathy   - BG is elevated   - Reorder home Lantus and SSI   - Carb control diet  - Continue Neurontin   - Monitor     HTN  - BP is controlled  - Continue home medications     HLD  - Continue statin     Depression   - Continue home medications     Atrial fibrillation   - Not on AC   - Continue home rate controllers  - Rate controlled, paced rhythm   - Continue to monitor     PAD  - follows with Vascular surgery   - s/p bilateral leg angioplasties after chronic non-healing wounds  - Continue home     AAA  - 4.3 cm on last duplex   - Follows with vascular  - Was due for surveillance imaging in Feb 2020     Chronic diabetic foot ulcer  - has followed with Dr. Edmond Lee and wound care   - s/p left hallux amputation  - Continue wound care for now   - Consult podiatry if wound integrity worsens      DVT Prophylaxis: Lovenox   Diet: DIET CARDIAC;  Code Status: [facility] course and plan of care discussed with Dr. Reyna Morelos.       Mariam Plants 12:39 PM 8/10/2020

## 2020-08-10 NOTE — PROGRESS NOTES
H/p dictation id [id number]. Date of service 08/10/20. Multifocal pneumonia. Cad.  ckd III. Htn. Copd.

## 2020-08-10 NOTE — H&P
medical  floor. VITAL SIGNS:  Temperature T-max 98.7, respiratory rate 20, pulse 69,  blood pressure 127/67, saturating 94%, lowest of 83%. CNS:  Alert, awake, and oriented. PSYCH:  The patient is cooperative, answering questions appropriately. Does appear slightly anxious. EYES:  Pupils are bilaterally equal.  ENT:  No oral mucosa lesions. RESPIRATORY SYSTEM:  The patient currently does not have any  asymmetrical chest wall movement or labored breathing. CVS:  Nontachycardic. ABDOMEN:  Soft. No evidence of distension. MUSCULOSKELETAL:  No acute obvious musculoskeletal deformities. SKIN:  The patient has got a midline biceps scar over the chest skin  that is old and healed well. LABORATORY DATA:  CBC showed white count 18.9, platelets 343, BUN 32,  creatinine 1.8. Sodium 139, potassium 5.5, troponin 0.04. BNP 3539. Chest x-ray showed multifocal left midline consolidation with  possibility of pneumonia with bilateral pleural effusions. EKG shows arm lead reversal.  D-dimer is 744, procalcitonin _____,  fibrinogen 637, glucose 232. CONSULTATION REQUESTED:  Currently none. Review of previous records shows lower extremity duplex from November  shows left anterior tibial artery occlusion based on the ABIs. IMPRESSION:  1.  Multifocal atypical pneumonia with normal procalcitonin with  possible suspicion for underlying COVID 19.  2.  Coronary artery disease. 3.  Stage III chronic kidney disease. 4.  Type 2 diabetes mellitus. PLAN OF CARE:  The patient admitted to Internal Medicine Service. The  patient has been placed on antibiotics. COVID testing was ordered. Supplemental oxygen will be continued. We will monitor the patient  carefully for any signs of hypoxia. DVT prophylaxis will be given with  Lovenox. CODE STATUS:  Full. EXPECTED LENGTH OF STAY:  More than 2 midnights based on plan of care  above.     RISKS:  High due to the patient's presentation with the worsening  pneumonia and the shortness of breath and cough and per the history of  present illness. DISPOSITION:  Admit to Internal Medicine Service.         Benito Montgomery MD    D: 08/10/2020 4:12:28       T: 08/10/2020 5:58:44     [email or web address]  Job#: [id number]     Doc#: [id number]    CC:

## 2020-08-10 NOTE — ED PROVIDER NOTES
Emergency Department Attending Note    Radha Mcnamara MD    Date of ED VIsit: 8/9/2020    CHIEF COMPLAINT  Shortness of Breath (pt brought to ED from [facility] for SOB, duoneb and solumedrol given en route)      HISTORY OF PRESENT ILLNESS  Alana Wolf is a 80 y.o. male  With Vital signs of BP (!) 170/81   Pulse 77   Temp 98.1 °F (36.7 °C) (Oral)   Resp 25   Ht 5' 8\" (1.727 m)   Wt 180 lb (81.6 kg)   SpO2 96%   BMI 27.37 kg/m²  who presents to the ED with a complaint of shortness of breath. Patient seen and evaluated in room 2. Patient comes into the emergency department from the [address] [place] [address] with a complaint of shortness of breath that started earlier in the day sometime during the morning. He denies any chest pain. He apparently has as needed O2 orders at the nursing home for shortness of breath. Patient has a history of diabetes hypertension COPD pneumonia and previous hypoxemic respiratory failure. There is no swelling to his legs. And his level of distress is minimal he is satting 95% on 3 L nasal cannula. no other complaints, modifying factors or associated symptoms.     Patients Past medical history reviewed and listed below  Past Medical History:   Diagnosis Date    AAA (abdominal aortic aneurysm) (HCC)     Acute constipation     Acute gastrointestinal hemorrhage     Anemia     Bladder CA in situ     BPH (benign prostatic hyperplasia)     Cancer (HCC)     bladder    CHF (congestive heart failure) (HCC)     Constipation     COPD (chronic obstructive pulmonary disease) (HCC)     Coronary arteriosclerosis     Current mild episode of major depressive disorder (HCC)     Depression     Diabetes mellitus ([facility]     Diabetic neuropathy (HCC)     Diverticulitis of intestine w/o perforation or abscess with bleeding     GERD (gastroesophageal reflux disease)     GI hemorrhage     Hypercholesteremia     Hypertension     Hypoaldosteronism ([address]     Neuropathy due to type 2 diabetes mellitus ([facility] [address]     Pacemaker     Postsurgical aortocoronary bypass status     Pure hypercholesterolemia     PVD (peripheral vascular disease) ([facility] [address]     S/P cataract surgery     Ulcer of left foot ([facility] [address]     Vitamin D deficiency      Past Surgical History:   Procedure Laterality Date    CARDIAC SURGERY      aortocoronary bypass    COLONOSCOPY  07/29/2019    Incomplete colonoscopy; Poor prep    COLONOSCOPY N/A 7/29/2019    COLON W/ANES. (8:00) (DAUGHTER,JANIA, IS POA, WILL BE HERE FOR PROCEDURE) performed by Radha Peter DO at [address]  08/22/2019    colon;    COLONOSCOPY N/A 8/22/2019    COLONOSCOPY POLYPECTOMY SNARE/COLD BIOPSY performed by Radha Peter DO at [address]      TOE AMPUTATION Left 10/10/2019    PARTIAL FOOT AMPUTATION (Left great toe)    TOE AMPUTATION Left 10/10/2019    PARTIAL FOOT AMPUTATION performed by Jewel Ryan DPM at [facility]       I have reviewed the following from the nursing documentation.     Family History   Problem Relation Age of Onset    Heart Attack Mother     Tuberculosis Mother     Heart Attack Father     Cancer Sister     Cancer Brother     Cancer Sister     Cancer Sister      Social History     Socioeconomic History    Marital status:      Spouse name: Not on file    Number of children: Not on file    Years of education: Not on file    Highest education level: Not on file   Occupational History    Not on file   Social Needs    Financial resource strain: Not on file    Food insecurity     Worry: Not on file     Inability: Not on file   [employer] needs     Medical: Not on file     Non-medical: Not on file   Tobacco Use    Smoking status: Former Smoker    Smokeless tobacco: Never Used    Tobacco comment: prior to 2010   Substance and Sexual Activity    Alcohol use: No    Drug use: No    Sexual activity: Not Currently Lifestyle    Physical activity     Days per week: Not on file     Minutes per session: Not on file    Stress: Not on file   Relationships    Social connections     Talks on phone: Not on file     Gets together: Not on file     Attends Muslim service: Not on file     Active member of club or organization: Not on file     Attends meetings of clubs or organizations: Not on file     Relationship status: Not on file    Intimate partner violence     Fear of current or ex partner: Not on file     Emotionally abused: Not on file     Physically abused: Not on file     Forced sexual activity: Not on file   Other Topics Concern    Not on file   Social History Narrative    Not on file     No current facility-administered medications for this encounter. Current Outpatient Medications   Medication Sig Dispense Refill    gabapentin (NEURONTIN) 100 MG capsule Take 100 mg by mouth every evening.       Nutritional Supplements (GLUCERNA ADVANCE SHAKE PO) Take by mouth 2 times daily      ALBUTEROL IN Inhale 0.83 % into the lungs as needed      triamcinolone (KENALOG) 0.025 % cream Apply topically every 4 hours as needed Apply Topically      ferrous sulfate 325 (65 Fe) MG tablet Take 325 mg by mouth daily (with breakfast)      insulin glargine (LANTUS) 100 UNIT/ML injection vial Inject 5 Units into the skin nightly       fluticasone-salmeterol (ADVAIR DISKUS) 100-50 MCG/DOSE diskus inhaler Inhale 1 puff into the lungs 2 times daily      ALOGLIPTIN BENZOATE PO Take 12.5 mg by mouth daily       furosemide (LASIX) 20 MG tablet Take 20 mg by mouth daily       acetaminophen (TYLENOL) 325 MG tablet Take 650 mg by mouth every 4 hours as needed for Pain      Protein POWD Take by mouth 1 scoop in 4oz of fluids bid for wound healing      glipiZIDE (GLUCOTROL) 5 MG tablet Take 5 mg by mouth 2 times daily (before meals)       vitamin D (CHOLECALCIFEROL) 5000 units CAPS capsule Take 10,000 Units by mouth once a week Weekly on Mondays.  DULoxetine (CYMBALTA) 60 MG extended release capsule Take 60 mg by mouth daily       polyethylene glycol (GLYCOLAX) packet Take 17 g by mouth Every Monday and Friday      insulin lispro (HUMALOG) 100 UNIT/ML injection vial Inject into the skin 4 times daily (before meals and nightly) Sliding scale 200-250=4 units  251-300=6 units  301-350=8 units  351-400=10 units  >400 call MD/CNP      metoprolol succinate (TOPROL XL) 25 MG extended release tablet 12.5 mg daily Hold for SBP <100      atorvastatin (LIPITOR) 40 MG tablet atorvastatin 40 mg tablet      aspirin 81 MG chewable tablet aspirin 81 mg chewable tablet   Chew 1 tablet every day by oral route.  omeprazole (PRILOSEC) 40 MG delayed release capsule 40 mg       finasteride (PROSCAR) 5 MG tablet finasteride 5 mg tablet       Allergies   Allergen Reactions    No Known Allergies        REVIEW OF SYSTEMS  10 systems reviewed, pertinent positives per HPI otherwise noted to be negative     PHYSICAL EXAM  BP (!) 170/81   Pulse 77   Temp 98.1 °F (36.7 °C) (Oral)   Resp 25   Ht 5' 8\" (1.727 m)   Wt 180 lb (81.6 kg)   SpO2 96%   BMI 27.37 kg/m²   GENERAL APPEARANCE: Awake and alert. Cooperative. In mild respiratory distress. HEAD: Normocephalic. Atraumatic. EYES: PERRL. EOM's grossly intact. ENT: Mucous membranes are pink and moist.   NECK: Supple. HEART: RRR. No murmurs. LUNGS: Respirations mildly labored. Wheezing noted throughout with Good air exchange. ABDOMEN: Soft. Non-distended. Non-tender. No masses. No organomegaly. No guarding or rebound. EXTREMITIES: No peripheral edema. Moves all extremities equally. All extremities neurovascularly intact. SKIN: Warm and dry. No acute rashes. NEUROLOGICAL: Alert and oriented. Strength 5/5, sensation intact. Gait normal.   PSYCHIATRIC: Normal mood and affect. No HI or SI expressed to me.     RADIOLOGY    If acquired see below     EKG:     If acquired see below       ED COURSE/MDM    Patient respiratory status improved with treatments from EMS and's here which included to duo nebs and Solu-Medrol. His radiograph of his chest revealed multifocal pneumonia. So with an oxygen requirement of 3 L to keep his sats in the upper 90s to 100% he is going to be admitted to the hospital.  I spoke with Dr. Austin Pandey who accepted him in transfer to [facility]    ED Course as of Aug 09 2249   Clinton Saúl Aug 09, 2020   2058 EKG: Indication: Shortness of breath, it shows a rhythm of paced rhythm at a rate of 70. This  interpreted by me without a cardiologist.       [DL]   2121 Patient's lactic acid was 1.9. Lactic Acid, Plasma:    Lactic Acid 1.9 [DL]   2121 CBC revealed a white count of 18.9 and H&H of 13 and 43 with a platelet count of 588   CBC Auto Differential(!):    WBC 18.9(!)   RBC 4.83   Hemoglobin Quant 13.7   Hematocrit 43.6   MCV 90.4   MCH 28.4   MCHC 31.4   RDW 15.8(!)   Platelet Count 275   MPV 7.8   Neutrophils % 81.9   Lymphocyte % 8.4   Monocytes % 8.0   Eosinophils % 0.6   Basophils % 1.1   Neutrophils Absolute 15.5(!)   Lymphocytes Absolute 1.6   Monocytes Absolute 1.5(!)   Eosinophils Absolute 0.1   Basophils Absolute 0.2 [DL]   2129 On a revisit after his DuoNeb the patient felt significantly better with his sats now 100% on 3 L nasal cannula.   He is actually asking specifically for a diet Pepsi    [DL]   2131 Patient's BNP was 3539 which is higher than normal   Brain Natriuretic Peptide(!):    Pro-BNP 3,539(!) [DL]   2131 CBC Auto Differential(!):    WBC 18.9(!)   RBC 4.83   Hemoglobin Quant 13.7   Hematocrit 43.6   MCV 90.4   MCH 28.4   MCHC 31.4   RDW 15.8(!)   Platelet Count 796   MPV 7.8   Neutrophils % 81.9   Lymphocyte % 8.4   Monocytes % 8.0   Eosinophils % 0.6   Basophils % 1.1   Neutrophils Absolute 15.5(!)   Lymphocytes Absolute 1.6   Monocytes Absolute 1.5(!)   Eosinophils Absolute 0.1   Basophils Absolute 0.2 [DL]   2131 Patient's comprehensive metabolic panel revealed a potassium of 5.5 glucose of 240 BUN 32 creatinine 1.8 with a GFR of 36. The creatinine is right in the area where his normal creatinine is these had a previous her creatinine of 1.8 and 1.5   Comprehensive Metabolic Panel w/ Reflex to MG(!):    Sodium 139   Potassium 5.5(!)   Chloride 100   CO2 28   Anion Gap 11   Glucose 240(!)   BUN 32(!)   Creatinine 1.8(!)   GFR Non- 36(!)   GFR  43(!)   Calcium 9.3   Total Protein 8.0   Albumin 4.0   Albumin/Globulin Ratio 1.0(!)   Bilirubin 1.4(!)   Alk Phos 126   ALT 15   AST 16   Globulin 4.0 [DL]   2159 Patient's troponin is 0.04. Patient had a troponin of 1.0 about a year ago. Troponin(!):    Troponin 0.04(!) [DL]   2203 IMPRESSION:  Multifocal left midlung consolidation, most consistent with pneumonia,  consider a bacterial origin.     Small bilateral pleural effusions. XR CHEST PORTABLE [DL]      ED Course User Index  [DL] Afshan Handley MD       The ED course and plan were reviewed and results discussed with the patient    The patient understood and agreed with the Discharge/transfer planning. CLINICAL IMPRESSION and DISPOSITION    Felton Abebe was stable and diagnosed with pneumonia    Patient was treated with Maxipime and Zithromax    Patient was admitted/transferred to [facility]. I spoke to Dr. Suze Leal and reviewed the patients labs and radiographic images, who accepted the continuity of this patients care. CRITICAL CARE TIME:   CRITICAL CARE NOTE:    I spent 45 minutes on the critical care of the patient since this illness of pneumonia acutely impaired one or more vital organ systems such that there was a high probability of imminent or life threatening deterioration of the patient's condition. This time includes discussion regarding the patients condition with paramedics, nurses, consultants and the family.  It also includes  a review of computer record data, interpretation of all test results and time spent charting. It does not include time spent on separately reported billable procedures.                        Latrell Youngblood MD  08/09/20 5839

## 2020-08-10 NOTE — PROGRESS NOTES
RESPIRATORY THERAPY ASSESSMENT    Name:  Jeffy [facility] Record Number:  [id number]  Age: 80 y.o. Gender: male  : 1934  Today's Date:  8/10/2020  Room:  0228/0228-01    Assessment     Is the patient being admitted for a COPD or Asthma exacerbation? No   (If yes the patient will be seen every 4 hours for the first 24 hours and then reassessed)    Patient Admission Diagnosis      Allergies  Allergies   Allergen Reactions    No Known Allergies        Minimum Predicted Vital Capacity:     na          Actual Vital Capacity:      na              Pulmonary History:COPD  Home Oxygen Therapy:  2 liters/min via nasal cannula  Home Respiratory Therapy: Advair BID  Current Respiratory Therapy:  Albuterol/Atrovent Q4WA          Respiratory Severity Index(RSI)   Patients with orders for inhalation medications, oxygen, or any therapeutic treatment modality will be placed on Respiratory Protocol. They will be assessed with the first treatment and at least every 72 hours thereafter. The following severity scale will be used to determine frequency of treatment intervention.     Smoking History: Pulmonary Disease or Smoking History, Greater than 15 pack year = 2    Social History  Social History     Tobacco Use    Smoking status: Former Smoker    Smokeless tobacco: Never Used    Tobacco comment: prior to    Substance Use Topics    Alcohol use: No    Drug use: No       Recent Surgical History: None = 0  Past Surgical History  Past Surgical History:   Procedure Laterality Date    CARDIAC SURGERY      aortocoronary bypass    COLONOSCOPY  2019    Incomplete colonoscopy; Poor prep    COLONOSCOPY N/A 2019    COLON W/ANES. (8:00) (DAUGHTER,JANIA, IS POA, WILL BE HERE FOR PROCEDURE) performed by Lisa Irwin DO at [facility]  2019    colon;    COLONOSCOPY N/A 2019    COLONOSCOPY POLYPECTOMY SNARE/COLD BIOPSY performed by Lisa Irwin DO at [facility] breaths per minute. Therapy will be held for heart rate (HR) greater than 140 beats per minute, pending direction from physician. 3. Bronchodilators will be administered via Metered Dose Inhaler (MDI) with spacer when the following criteria are met:  a. Alert and cooperative     b. HR < 140 bpm  c. RR < 30 bpm                d. Can demonstrate a 2-3 second inspiratory hold  4. Bronchodilators will be administered via Hand Held Nebulizer [facility]) to patients when ANY of the following criteria are met  a. Incognizant or uncooperative          b. Patients treated with HHN at Home        c. Unable to demonstrate proper use of MDI with spacer     d. RR > 30 bpm   5. Bronchodilators will be delivered via Metered Dose Inhaler (MDI), HHN, Aerogen to intubated patients on mechanical ventilation. 6. Inhalation medication orders will be delivered and/or substituted as outlined below. Aerosolized Medications Ordering and Administration Guidelines:    1. All Medications will be ordered by a physician, and their frequency and/or modality will be adjusted as defined by the patients Respiratory Severity Index (RSI) score. 2. If the patient does not have documented COPD, consider discontinuing anticholinergics when RSI is less than 9.  3. If the bronchospasm worsens (increased RSI), then the bronchodilator frequency can be increased to a maximum of every 4 hours. If greater than every 4 hours is required, the physician will be contacted. 4. If the bronchospasm improves, the frequency of the bronchodilator can be decreased, based on the patient's RSI, but not less than home treatment regimen frequency. 5. Bronchodilator(s) will be discontinued if patient has a RSI less than 9 and has received no scheduled or as needed treatment for 72  Hrs. Patients Ordered on a Mucolytic Agent:    1. Must always be administered with a bronchodilator.     2. Discontinue if patient experiences worsened bronchospasm, or secretions have lessened to the point that the patient is able to clear them with a cough. Anti-inflammatory and Combination Medications:    1. If the patient lacks prior history of lung disease, is not using inhaled anti-inflammatory medication at home, and lacks wheezing by examination or by history for at least 24 hours, contact physician for possible discontinuation.

## 2020-08-10 NOTE — PROGRESS NOTES
Pt admitted to ,  # [id number]. Pt a/o x4 VSS Resp e/e. Admission assessment completed and charted. Pt oriented to /facility. Call light in reach. Will monitor.  Rena Roland

## 2020-08-10 NOTE — PLAN OF CARE
Problem: Falls - Risk of:  Goal: Will remain free from falls  Description: Will remain free from falls  Outcome: Ongoing     Problem: Falls - Risk of:  Goal: Absence of physical injury  Description: Absence of physical injury  Outcome: Ongoing     Problem: Discharge Planning:  Goal: Discharged to appropriate level of care  Description: Discharged to appropriate level of care  Outcome: Ongoing     Problem: Discharge Planning:  Goal: Participates in care planning  Description: Participates in care planning  Outcome: Ongoing     Problem: Airway Clearance - Ineffective:  Goal: Clear lung sounds  Description: Clear lung sounds  Outcome: Ongoing     Problem: Airway Clearance - Ineffective:  Goal: Ability to maintain a clear airway will improve  Description: Ability to maintain a clear airway will improve  Outcome: Ongoing     Problem: Fluid Volume - Deficit:  Goal: Achieves intake and output within specified parameters  Description: Achieves intake and output within specified parameters  Outcome: Ongoing

## 2020-08-11 PROBLEM — N18.30 STAGE 3 CHRONIC KIDNEY DISEASE (HCC): Status: ACTIVE | Noted: 2020-08-11

## 2020-08-11 LAB
ANION GAP SERPL CALCULATED.3IONS-SCNC: 10 MMOL/L (ref 3–16)
BASOPHILS ABSOLUTE: 0 K/UL (ref 0–0.2)
BASOPHILS RELATIVE PERCENT: 0.2 %
BUN BLDV-MCNC: 41 MG/DL (ref 7–20)
CALCIUM SERPL-MCNC: 8.8 MG/DL (ref 8.3–10.6)
CHLORIDE BLD-SCNC: 101 MMOL/L (ref 99–110)
CO2: 25 MMOL/L (ref 21–32)
CREAT SERPL-MCNC: 1.8 MG/DL (ref 0.8–1.3)
EOSINOPHILS ABSOLUTE: 0 K/UL (ref 0–0.6)
EOSINOPHILS RELATIVE PERCENT: 0 %
GFR AFRICAN AMERICAN: 43
GFR NON-AFRICAN AMERICAN: 36
GLUCOSE BLD-MCNC: 102 MG/DL (ref 70–99)
GLUCOSE BLD-MCNC: 124 MG/DL (ref 70–99)
GLUCOSE BLD-MCNC: 158 MG/DL (ref 70–99)
GLUCOSE BLD-MCNC: 165 MG/DL (ref 70–99)
GLUCOSE BLD-MCNC: 216 MG/DL (ref 70–99)
GLUCOSE BLD-MCNC: 218 MG/DL (ref 70–99)
HCT VFR BLD CALC: 39.6 % (ref 40.5–52.5)
HEMOGLOBIN: 12.6 G/DL (ref 13.5–17.5)
LYMPHOCYTES ABSOLUTE: 0.7 K/UL (ref 1–5.1)
LYMPHOCYTES RELATIVE PERCENT: 5.3 %
MCH RBC QN AUTO: 29 PG (ref 26–34)
MCHC RBC AUTO-ENTMCNC: 31.7 G/DL (ref 31–36)
MCV RBC AUTO: 91.5 FL (ref 80–100)
MONOCYTES ABSOLUTE: 1.4 K/UL (ref 0–1.3)
MONOCYTES RELATIVE PERCENT: 10.2 %
NEUTROPHILS ABSOLUTE: 11.8 K/UL (ref 1.7–7.7)
NEUTROPHILS RELATIVE PERCENT: 84.3 %
PDW BLD-RTO: 16 % (ref 12.4–15.4)
PERFORMED ON: ABNORMAL
PLATELET # BLD: 253 K/UL (ref 135–450)
PMV BLD AUTO: 7.7 FL (ref 5–10.5)
POTASSIUM REFLEX MAGNESIUM: 4.5 MMOL/L (ref 3.5–5.1)
RBC # BLD: 4.33 M/UL (ref 4.2–5.9)
SODIUM BLD-SCNC: 136 MMOL/L (ref 136–145)
VANCOMYCIN RANDOM: 12.4 UG/ML
WBC # BLD: 14 K/UL (ref 4–11)

## 2020-08-11 PROCEDURE — 36415 COLL VENOUS BLD VENIPUNCTURE: CPT

## 2020-08-11 PROCEDURE — 99223 1ST HOSP IP/OBS HIGH 75: CPT | Performed by: INTERNAL MEDICINE

## 2020-08-11 PROCEDURE — 99232 SBSQ HOSP IP/OBS MODERATE 35: CPT | Performed by: INTERNAL MEDICINE

## 2020-08-11 PROCEDURE — 6360000002 HC RX W HCPCS: Performed by: PHYSICIAN ASSISTANT

## 2020-08-11 PROCEDURE — 85025 COMPLETE CBC W/AUTO DIFF WBC: CPT

## 2020-08-11 PROCEDURE — 80202 ASSAY OF VANCOMYCIN: CPT

## 2020-08-11 PROCEDURE — 2580000003 HC RX 258: Performed by: INTERNAL MEDICINE

## 2020-08-11 PROCEDURE — 94640 AIRWAY INHALATION TREATMENT: CPT

## 2020-08-11 PROCEDURE — 94761 N-INVAS EAR/PLS OXIMETRY MLT: CPT

## 2020-08-11 PROCEDURE — 1200000000 HC SEMI PRIVATE

## 2020-08-11 PROCEDURE — 6370000000 HC RX 637 (ALT 250 FOR IP): Performed by: PHYSICIAN ASSISTANT

## 2020-08-11 PROCEDURE — 2580000003 HC RX 258

## 2020-08-11 PROCEDURE — 6360000002 HC RX W HCPCS: Performed by: INTERNAL MEDICINE

## 2020-08-11 PROCEDURE — 2580000003 HC RX 258: Performed by: PHYSICIAN ASSISTANT

## 2020-08-11 PROCEDURE — 80048 BASIC METABOLIC PNL TOTAL CA: CPT

## 2020-08-11 RX ORDER — SODIUM CHLORIDE 9 MG/ML
INJECTION, SOLUTION INTRAVENOUS
Status: COMPLETED
Start: 2020-08-11 | End: 2020-08-11

## 2020-08-11 RX ADMIN — Medication 10 ML: at 21:45

## 2020-08-11 RX ADMIN — FINASTERIDE 5 MG: 5 TABLET, FILM COATED ORAL at 08:32

## 2020-08-11 RX ADMIN — INSULIN LISPRO 1 UNITS: 100 INJECTION, SOLUTION INTRAVENOUS; SUBCUTANEOUS at 11:49

## 2020-08-11 RX ADMIN — PANTOPRAZOLE SODIUM 40 MG: 40 TABLET, DELAYED RELEASE ORAL at 04:47

## 2020-08-11 RX ADMIN — INSULIN LISPRO 2 UNITS: 100 INJECTION, SOLUTION INTRAVENOUS; SUBCUTANEOUS at 08:32

## 2020-08-11 RX ADMIN — AMIODARONE HYDROCHLORIDE 100 MG: 200 TABLET ORAL at 08:31

## 2020-08-11 RX ADMIN — Medication 10 ML: at 13:34

## 2020-08-11 RX ADMIN — Medication 2 PUFF: at 20:13

## 2020-08-11 RX ADMIN — DULOXETINE HYDROCHLORIDE 60 MG: 60 CAPSULE, DELAYED RELEASE ORAL at 08:31

## 2020-08-11 RX ADMIN — CEFEPIME 2 G: 2 INJECTION, POWDER, FOR SOLUTION INTRAMUSCULAR; INTRAVENOUS at 21:48

## 2020-08-11 RX ADMIN — SODIUM CHLORIDE 250 ML: 9 INJECTION, SOLUTION INTRAVENOUS at 21:49

## 2020-08-11 RX ADMIN — FLUOXETINE 10 MG: 10 CAPSULE ORAL at 21:44

## 2020-08-11 RX ADMIN — Medication 2 PUFF: at 07:14

## 2020-08-11 RX ADMIN — GABAPENTIN 300 MG: 300 CAPSULE ORAL at 21:44

## 2020-08-11 RX ADMIN — METOPROLOL SUCCINATE 12.5 MG: 25 TABLET, EXTENDED RELEASE ORAL at 08:33

## 2020-08-11 RX ADMIN — AZITHROMYCIN 500 MG: 500 INJECTION, POWDER, LYOPHILIZED, FOR SOLUTION INTRAVENOUS at 21:49

## 2020-08-11 RX ADMIN — GABAPENTIN 300 MG: 300 CAPSULE ORAL at 08:31

## 2020-08-11 RX ADMIN — ATORVASTATIN CALCIUM 40 MG: 40 TABLET, FILM COATED ORAL at 08:31

## 2020-08-11 RX ADMIN — ASPIRIN 81 MG CHEWABLE TABLET 81 MG: 81 TABLET CHEWABLE at 08:32

## 2020-08-11 RX ADMIN — ENOXAPARIN SODIUM 30 MG: 30 INJECTION SUBCUTANEOUS at 08:33

## 2020-08-11 RX ADMIN — CEFEPIME 2 G: 2 INJECTION, POWDER, FOR SOLUTION INTRAMUSCULAR; INTRAVENOUS at 08:33

## 2020-08-11 RX ADMIN — FUROSEMIDE 20 MG: 20 TABLET ORAL at 08:31

## 2020-08-11 RX ADMIN — VANCOMYCIN HYDROCHLORIDE 1000 MG: 1 INJECTION, POWDER, LYOPHILIZED, FOR SOLUTION INTRAVENOUS at 13:30

## 2020-08-11 NOTE — PROGRESS NOTES
Am assessment completed. See flowsheet. Pt denies needs at this time. Call light within reach. Phuong Simms RN\

## 2020-08-11 NOTE — PROGRESS NOTES
Vancomycin Day: 2    Patient's labs, cultures, vitals, and vancomycin regimen reviewed. Vanc random 12. 4mcg/ml today, will place order for Vanc 1gm now. Random a noon tomorrow. Continue to pulse dose  [place] D 8/11/202012:28 PM  .

## 2020-08-11 NOTE — FLOWSHEET NOTE
08/10/20 2034   Vital Signs   Temp 97.2 °F (36.2 °C)   Temp Source Oral   Pulse 70   Resp 18   BP (!) 141/71   BP Location Right upper arm   Patient Position Semi fowlers   Level of Consciousness 0   MEWS Score 1   Oxygen Therapy   SpO2 93 %   O2 Device None (Room air)   Assessment complete, see flowsheets. Pt resting in bed at this time, dressings to ankles changed at this time. Pt denies further needs at this time, call light within reach. Will continue to monitor.   Mercedes  RN

## 2020-08-11 NOTE — DISCHARGE SUMMARY
Name:  Junito Graham  Room:  4893/0554-69  MRN:    [id number]    Discharge Summary      This discharge summary is in conjunction with a complete physical exam done on the day of discharge. Discharging Physician: Dr. Antony Meza: 8/9/2020  Discharge:   8/17/2020    HPI taken from admission H&P:    The patient is an 26-year-old Causation  male patient presents to the hospital with a chief complaints of what he describes as a one-to-two-day history of subacute onset of gradually progressive increasing exertional shortness of breath. The patient comes into the [facility]. The patient notes that it kept on getting progressively worse along with cough without any expectoration, without nausea or vomiting. The patient does note that he just feels  very tired and exhausted.     Diagnoses this Admission and Hospital Course   Acute hypoxic respiratory failure resolved. - Does not use supplemental O2 at home   - Was requiring up to 4L NC--> now on room air --> wean as tolerated to room air  - Suspect 2/2 multifocal PNA, see below  - COVID 19 negative. - pulmonology consulted     Multifocal PNA-done with his antibiotics. - CXR shows bilateral effusions with multifocal PNA  - Concerns for gram neg organism with risk for MRSA (patient is from SNF). He is finished vancomycin cefepime and azithromycin.  - PCT is WNL  - Pulmonology consult     COPD  - not reported as AE     Leukocytosis   - suspect 2/2 HCAP   - Trend       Hyperkalemia  - K+ on admission 5.5.   Repeat potassium levels are normal  - EKG without acute change     MARCOS on CKD Stage III   - Baseline creatinine ~1.6   - Creatinine on admission 1.8  -Creatinine is up a little to 2-->2.9-->4.6  - Patient is discharging to inpatient hospice unit      Elevated troponin   - paced rhythm on EKG  - No acute CP complaints per chart  - may be related to CKD  - Continue tele monitor  - Trend troponin   - PRN EKG      DM2 with neuropathy   - BG is elevated - Reorder home Lantus and SSI   - Carb control diet  - Continue Neurontin   - Monitor      HTN  - BP is controlled  - Continue home medications      HLD  - Continue statin      Depression   - Continue home medications      Atrial fibrillation   - Not on AC   - Continue home rate controllers  - Rate controlled, paced rhythm   - Continue to monitor      PAD  - follows with Vascular surgery   - s/p bilateral leg angioplasties after chronic non-healing wounds  - Continue home      AAA  - 4.3 cm on last duplex   - Follows with vascular  - Was due for surveillance imaging in Feb 2020      Chronic diabetic foot ulcer  - has followed with Dr. Enedina Herrmann and wound care   - s/p left hallux amputation  - Continue wound care for now   - Consult podiatry if wound integrity worsens      Hyperactive delirium  - Mentation is gotten worse-->family initially wanted aggressive therapy, including neurology evaluation and EEG for possible seizure DO however, given worsening medical clinical picture, has decided on palliative care  - plans for inpatient hospice care on discharge. Discussed with family multiple times on the phone. Talked to [facility]. Procedures (Please Review Full Report for Details)  None     Consults    Pulmonology    Physical Exam at Discharge:    BP (!) 163/83   Pulse 78   Temp 97.1 °F (36.2 °C) (Axillary)   Resp 20   Ht 5' 8\" (1.727 m)   Wt 177 lb 4.8 oz (80.4 kg)   SpO2 96%   BMI 26.96 kg/m²     CNS: eyes closed and does not respond to verbal command  PSYCH:  The patient is cooperative, answering questions appropriately. EYES:  Pupils are bilaterally equal.  ENT:  No oral mucosa lesions. RESPIRATORY SYSTEM:   Clear to auscultation , no wheezes rales or rhonchi . CVS:   Regular rate and rhythm. ABDOMEN:  Soft.  No evidence of distension. MUSCULOSKELETAL:  No acute obvious musculoskeletal deformities.   SKIN:  The patient has got a midline biceps scar over the chest skin that is old and healed well.  NEURO : Nonfocal     CBC:   Recent Labs     08/16/20  0803   WBC 25.0*   HGB 14.8   HCT 46.9   MCV 91.0        BMP:   Recent Labs     08/15/20  0630 08/16/20  0803 08/17/20  0613    138 146*   K 5.1 4.9 5.2*   CL 97* 100 107   CO2 17* 17* 15*   PHOS  --   --  5.4*   BUN 55* 73* 98*   CREATININE 2.0* 2.9* 4.6*     LIVER PROFILE:   Recent Labs     08/16/20  0803   *   ALT 51*   BILIDIR 0.4*   BILITOT 1.4*   ALKPHOS 111     CULTURES  COVID-19: pending     RADIOLOGY  CT HEAD WO CONTRAST   Final Result   No acute intracranial abnormality. XR CHEST PORTABLE   Final Result   Multifocal left midlung consolidation, most consistent with pneumonia,   consider a bacterial origin. Small bilateral pleural effusions. Radiographic follow-up to resolution is recommended. Discharge Medications     Medication List      START taking these medications    haloperidol lactate 5 MG/ML injection  Commonly known as:  HALDOL  Inject 0.5 mLs into the muscle every 4 hours as needed for Agitation     LORazepam 2 MG/ML injection  Commonly known as:  ATIVAN  Infuse 0.5 mLs intravenously every 4 hours as needed (agitation) for up to 1 day.         STOP taking these medications    acetaminophen 325 MG tablet  Commonly known as:  TYLENOL     Advair Diskus 100-50 MCG/DOSE diskus inhaler  Generic drug:  fluticasone-salmeterol     ALBUTEROL IN     ALOGLIPTIN BENZOATE PO     amiodarone 100 MG tablet  Commonly known as:  PACERONE     aspirin 81 MG chewable tablet     atorvastatin 40 MG tablet  Commonly known as:  LIPITOR     DULoxetine 60 MG extended release capsule  Commonly known as:  CYMBALTA     ferrous sulfate 325 (65 Fe) MG tablet  Commonly known as:  IRON 325     finasteride 5 MG tablet  Commonly known as:  PROSCAR     FLUoxetine 10 MG capsule  Commonly known as:  PROZAC     furosemide 20 MG tablet  Commonly known as:  LASIX     gabapentin 100 MG capsule  Commonly known as:  NEURONTIN glipiZIDE 5 MG tablet  Commonly known as:  GLUCOTROL     GLUCERNA ADVANCE SHAKE PO     insulin glargine 100 UNIT/ML injection vial  Commonly known as:  LANTUS     insulin lispro 100 UNIT/ML injection vial  Commonly known as:  HUMALOG     omeprazole 40 MG delayed release capsule  Commonly known as:  PRILOSEC     polyethylene glycol 17 g packet  Commonly known as:  GLYCOLAX     Protein Powd     Toprol XL 25 MG extended release tablet  Generic drug:  metoprolol succinate     triamcinolone 0.1 % cream  Commonly known as:  KENALOG     vitamin D 125 MCG (5000 UT) Caps capsule  Commonly known as:  CHOLECALCIFEROL           Where to Get Your Medications      Information about where to get these medications is not yet available    Ask your nurse or doctor about these medications  · haloperidol lactate 5 MG/ML injection  · LORazepam 2 MG/ML injection         Discharged in terminal condition to inpatient hospice     Follow Up:   Follow up with hospice       More than 30 mts spent      Ugo Bradshaw 8/18/2020 6:18 PM

## 2020-08-11 NOTE — PLAN OF CARE
Problem: Falls - Risk of:  Goal: Will remain free from falls  Description: Will remain free from falls  8/11/2020 1951 by Tawny Saavedra RN  Outcome: Ongoing  8/11/2020 1008 by Maribel Horn RN  Outcome: Ongoing  Goal: Absence of physical injury  Description: Absence of physical injury  8/11/2020 1951 by Tawny Saavedra RN  Outcome: Ongoing  8/11/2020 1008 by Maribel Horn RN  Outcome: Ongoing     Problem: Discharge Planning:  Goal: Discharged to appropriate level of care  Description: Discharged to appropriate level of care  8/11/2020 1951 by Tawny Saavedra RN  Outcome: Ongoing  8/11/2020 1008 by Maribel Horn RN  Outcome: Ongoing  Goal: Participates in care planning  Description: Participates in care planning  8/11/2020 1951 by Tawny Saavedra RN  Outcome: Ongoing  8/11/2020 1008 by Maribel Horn RN  Outcome: Ongoing     Problem: Airway Clearance - Ineffective:  Goal: Clear lung sounds  Description: Clear lung sounds  8/11/2020 1951 by Tawny Saavedra RN  Outcome: Ongoing  8/11/2020 1008 by Maribel Horn RN  Outcome: Ongoing  Goal: Ability to maintain a clear airway will improve  Description: Ability to maintain a clear airway will improve  8/11/2020 1951 by Tawny Saavedra RN  Outcome: Ongoing  8/11/2020 1008 by Maribel Horn RN  Outcome: Ongoing     Problem: Fluid Volume - Deficit:  Goal: Achieves intake and output within specified parameters  Description: Achieves intake and output within specified parameters  8/11/2020 1951 by Tawny Saavedra RN  Outcome: Ongoing  8/11/2020 1008 by Maribel Horn RN  Outcome: Ongoing     Problem: Gas Exchange - Impaired:  Goal: Levels of oxygenation will improve  Description: Levels of oxygenation will improve  8/11/2020 1951 by Tawny Saavedra RN  Outcome: Ongoing  8/11/2020 1008 by Maribel Horn RN  Outcome: Ongoing     Problem: Safety:  Goal: Free from accidental physical injury  Description: Free from accidental physical injury  8/11/2020 1951 by America [address] RN  Outcome: Ongoing  8/11/2020 1008 by Lisha Chavez RN  Outcome: Ongoing     Problem: Daily Care:  Goal: Daily care needs are met  Description: Daily care needs are met  8/11/2020 1951 by Diann Al RN  Outcome: Ongoing  8/11/2020 1008 by Lisha Chavez RN  Outcome: Ongoing     Problem: Pain:  Goal: Patient's pain/discomfort is manageable  Description: Patient's pain/discomfort is manageable  8/11/2020 1951 by Diann Al RN  Outcome: Ongoing  8/11/2020 1008 by Lisha Chavez RN  Outcome: Ongoing     Problem: Skin Integrity:  Goal: Skin integrity will stabilize  Description: Skin integrity will stabilize  8/11/2020 1951 by Diann Al RN  Outcome: Ongoing  8/11/2020 1008 by Lisha Chavez RN  Outcome: Ongoing     Problem: Skin Integrity:  Goal: Will show no infection signs and symptoms  Description: Will show no infection signs and symptoms  8/11/2020 1951 by Diann Al RN  Outcome: Ongoing  8/11/2020 1008 by Lisha Chavez RN  Outcome: Ongoing  Goal: Absence of new skin breakdown  Description: Absence of new skin breakdown  8/11/2020 1951 by Diann Al RN  Outcome: Ongoing  8/11/2020 1008 by Lisha Chavez RN  Outcome: Ongoing

## 2020-08-11 NOTE — PROGRESS NOTES
Progress Note    Admit Date:  8/9/2020    Admitted for PNA and COVID-19 rule out . Chest x-ray with multifocal airspace disease. COVID-19 pending, patient remains on droplet plus isolation    Subjective:  Mr. Fletcher Kayser  Is stable and afebrile. He feels much better today. He is on room air. Objective:   Vitals:    08/11/20 1330   BP: 122/69   Pulse: 70   Resp: 18   Temp: 96.9 °F (36.1 °C)   SpO2: 95%       Intake/Output Summary (Last 24 hours) at 8/11/2020 1522  Last data filed at 8/11/2020 1330  Gross per 24 hour   Intake 1180 ml   Output 975 ml   Net 205 ml       Physical Exam:  CNS:  Alert, awake, and oriented. PSYCH:  The patient is cooperative, answering questions appropriately. EYES:  Pupils are bilaterally equal.  ENT:  No oral mucosa lesions. RESPIRATORY SYSTEM:   Clear to auscultation , no wheezes rales or rhonchi . CVS:   Regular rate and rhythm. ABDOMEN:  Soft. No evidence of distension. MUSCULOSKELETAL:  No acute obvious musculoskeletal deformities. SKIN:  The patient has got a midline biceps scar over the chest skin that is old and healed well.   NEURO : Nonfocal       Scheduled Meds:   sodium chloride flush  10 mL Intravenous 2 times per day    enoxaparin  30 mg Subcutaneous Daily    azithromycin  500 mg Intravenous Q24H    cefepime  2 g Intravenous Q12H    amiodarone  100 mg Oral Daily    aspirin  81 mg Oral Daily    atorvastatin  40 mg Oral Daily    DULoxetine  60 mg Oral Daily    finasteride  5 mg Oral Daily    FLUoxetine  10 mg Oral Nightly    budesonide-formoterol  2 puff Inhalation BID    furosemide  20 mg Oral Daily    gabapentin  300 mg Oral BID    insulin glargine  20 Units Subcutaneous Nightly    metoprolol succinate  12.5 mg Oral Daily    pantoprazole  40 mg Oral QAM AC    insulin lispro  0-6 Units Subcutaneous TID WC    insulin lispro  0-3 Units Subcutaneous Nightly    vancomycin (VANCOCIN) intermittent dosing (placeholder)   Other RX Placeholder Continuous Infusions:   dextrose         PRN Meds:  sodium chloride flush, promethazine **OR** ondansetron, albuterol sulfate HFA, ipratropium, glucose, dextrose, glucagon (rDNA), dextrose      Data:  CBC:   Recent Labs     08/09/20 2050 08/11/20  0620   WBC 18.9* 14.0*   HGB 13.7 12.6*   HCT 43.6 39.6*   MCV 90.4 91.5    253     BMP:   Recent Labs     08/09/20 2050 08/11/20  0620    136   K 5.5* 4.5    101   CO2 28 25   BUN 32* 41*   CREATININE 1.8* 1.8*     LIVER PROFILE:   Recent Labs     08/09/20 2050   AST 16   ALT 15   BILITOT 1.4*   ALKPHOS 126     Pro-BNP  3,539High      Troponin  0. 04High       D-Dimer, Quant  744High      Procalcitonin  0.14      CULTURES  COVID-19: pending      RADIOLOGY  XR CHEST PORTABLE   Final Result   Multifocal left midlung consolidation, most consistent with pneumonia,   consider a bacterial origin. Small bilateral pleural effusions. Radiographic follow-up to resolution is recommended. Assessment/Plan:  Acute hypoxic respiratory failure  - Does not use supplemental O2 at home   - Was requiring up to 4L NC--> now on 1L --> wean as tolerated to room air  - Suspect 2/2 multifocal PNA, see below  - CAN NOT RULE OUT COVID-19   - DROPLET + PRECAUTIONS   - COVID-19 TESTING PENDING   - pulmonology consult     Multifocal PNA  - CXR shows bilateral effusions with multifocal PNA  - Concerns for gram neg organism with risk for MRSA (patient is from SNF), continue Cefepime/Azithromycin. Vancomycin added  - PCT is WNL  - Pulmonology consult  Discussed with pulmonologist.   continue current IV antibiotics and monitor today    COPD  - not reported as AE  - NO NEBULIZERS until COVID-19 is ruled out      Leukocytosis   - suspect 2/2 HCAP   - Trend      Hyperkalemia  - K+ on admission 5.5.   Repeat potassium levels are normal  - EKG without acute change    CKD Stage III   - Baseline creatinine ~1.6   - Creatinine on admission 1.8  - stable Elevated troponin   - paced rhythm on EKG  - No acute CP complaints per chart  - may be related to CKD  - Continue tele monitor  - Trend troponin   - PRN EKG     DM2 with neuropathy   - BG is elevated   - Reorder home Lantus and SSI   - Carb control diet  - Continue Neurontin   - Monitor     HTN  - BP is controlled  - Continue home medications     HLD  - Continue statin     Depression   - Continue home medications     Atrial fibrillation   - Not on AC   - Continue home rate controllers  - Rate controlled, paced rhythm   - Continue to monitor     PAD  - follows with Vascular surgery   - s/p bilateral leg angioplasties after chronic non-healing wounds  - Continue home     AAA  - 4.3 cm on last duplex   - Follows with vascular  - Was due for surveillance imaging in Feb 2020     Chronic diabetic foot ulcer  - has followed with Dr. Oswaldo Velasquez and wound care   - s/p left hallux amputation  - Continue wound care for now   - Consult podiatry if wound integrity worsens      DVT Prophylaxis: Lovenox   Diet: DIET CARDIAC; Carb Control: 4 carb choices (60 gms)/meal  Code Status: DNR-CCA    COVID-19 pending  Patient can be taken off of droplet plus precautions  If this results negative later today, this is okay per pulmonology  - Continue current IV antibiotics,  monitor in the hospital for today. Likely discharge back to the nursing home tomorrow if patient continues to improve and remains stable.   And if COVID-19 negative    Mikael Juarez 3:22 PM 8/11/2020

## 2020-08-11 NOTE — PLAN OF CARE
Problem: Falls - Risk of:  Goal: Will remain free from falls  Description: Will remain free from falls  Outcome: Ongoing  Goal: Absence of physical injury  Description: Absence of physical injury  Outcome: Ongoing     Problem: Discharge Planning:  Goal: Discharged to appropriate level of care  Description: Discharged to appropriate level of care  Outcome: Ongoing  Goal: Participates in care planning  Description: Participates in care planning  Outcome: Ongoing     Problem: Airway Clearance - Ineffective:  Goal: Clear lung sounds  Description: Clear lung sounds  Outcome: Ongoing  Goal: Ability to maintain a clear airway will improve  Description: Ability to maintain a clear airway will improve  Outcome: Ongoing     Problem: Fluid Volume - Deficit:  Goal: Achieves intake and output within specified parameters  Description: Achieves intake and output within specified parameters  Outcome: Ongoing     Problem: Gas Exchange - Impaired:  Goal: Levels of oxygenation will improve  Description: Levels of oxygenation will improve  Outcome: Ongoing     Problem: Safety:  Goal: Free from accidental physical injury  Description: Free from accidental physical injury  Outcome: Ongoing     Problem: Daily Care:  Goal: Daily care needs are met  Description: Daily care needs are met  Outcome: Ongoing     Problem: Pain:  Goal: Patient's pain/discomfort is manageable  Description: Patient's pain/discomfort is manageable  Outcome: Ongoing     Problem: Skin Integrity:  Goal: Skin integrity will stabilize  Description: Skin integrity will stabilize  Outcome: Ongoing     Problem: Skin Integrity:  Goal: Will show no infection signs and symptoms  Description: Will show no infection signs and symptoms  Outcome: Ongoing  Goal: Absence of new skin breakdown  Description: Absence of new skin breakdown  Outcome: Ongoing

## 2020-08-11 NOTE — CONSULTS
Patient is being seen at the request of Andrey Velazquez for a consultation for pneumonia    HISTORY OF PRESENT ILLNESS: The patient is an 68-year-old man with a past medical history of atrial fibrillation, CHF, COPD, diabetes mellitus, bladder cancer, distant former smoker who presented to [facility] with 2 to 3 days of worsening shortness of breath and a productive cough. Patient is a resident at the [address] [place] [address] and notes that over the last week he has been feeling poorly. Over the last 2 to 3 days he has a cough productive of yellow sputum. He denies any hemoptysis. He feels associated fatigue and lethargy. Since admission to the hospital he does not feel much better.     PAST MEDICAL HISTORY:  Past Medical History:   Diagnosis Date    AAA (abdominal aortic aneurysm) (HCC)     Acute constipation     Acute gastrointestinal hemorrhage     Anemia     Atrial fibrillation (HCC)     Bladder CA in situ     BPH (benign prostatic hyperplasia)     Cancer (HCC)     bladder    CHF (congestive heart failure) (HCC)     Chronic pain     CKD (chronic kidney disease)     Constipation     COPD (chronic obstructive pulmonary disease) (HCC)     Coronary arteriosclerosis     Current mild episode of major depressive disorder (HCC)     Depression     Depression     Diabetes mellitus (HCC)     Diabetic neuropathy (HCC)     Diverticulitis of intestine w/o perforation or abscess with bleeding     GERD (gastroesophageal reflux disease)     GI hemorrhage     Hypercholesteremia     Hypertension     Hypoaldosteronism (HCC)     Insomnia     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 (8:00) (DAUGHTER,JANIA, IS POA, WILL BE HERE FOR PROCEDURE) performed by Ana Laura Fuentes DO at [facility]  08/22/2019    colon;    COLONOSCOPY N/A 8/22/2019    COLONOSCOPY POLYPECTOMY SNARE/COLD BIOPSY performed by Ana Laura Fuentes DO at [address]      TOE AMPUTATION Left 10/10/2019    PARTIAL FOOT AMPUTATION (Left great toe)    TOE AMPUTATION Left 10/10/2019    PARTIAL FOOT AMPUTATION performed by Lata Castellanos DPM at [facility] OR       FAMILY HISTORY:  family history includes Cancer in his brother, sister, sister, and sister; Heart Attack in his father and mother; Tuberculosis in his mother. SOCIAL HISTORY:   reports that he has quit smoking.  He has never used smokeless tobacco.    Scheduled Meds:   sodium chloride flush  10 mL Intravenous 2 times per day    enoxaparin  30 mg Subcutaneous Daily    azithromycin  500 mg Intravenous Q24H    cefepime  2 g Intravenous Q12H    amiodarone  100 mg Oral Daily    aspirin  81 mg Oral Daily    atorvastatin  40 mg Oral Daily    DULoxetine  60 mg Oral Daily    finasteride  5 mg Oral Daily    FLUoxetine  10 mg Oral Nightly    budesonide-formoterol  2 puff Inhalation BID    furosemide  20 mg Oral Daily    gabapentin  300 mg Oral BID    insulin glargine  20 Units Subcutaneous Nightly    metoprolol succinate  12.5 mg Oral Daily    pantoprazole  40 mg Oral QAM AC    insulin lispro  0-6 Units Subcutaneous TID WC    insulin lispro  0-3 Units Subcutaneous Nightly    vancomycin (VANCOCIN) intermittent dosing (placeholder)   Other RX Placeholder     Continuous Infusions:   dextrose       PRN Meds:  sodium chloride flush, promethazine **OR** ondansetron, albuterol sulfate HFA, ipratropium, glucose, dextrose, glucagon (rDNA), dextrose    ALLERGIES:  Patient is allergic to no known allergies. REVIEW OF SYSTEMS:  Constitutional: Negative for fever  HENT: Negative for sore throat  Eyes: Negative for redness   Respiratory: + for dyspnea, + cough  Cardiovascular: Negative for chest pain  Gastrointestinal: Negative for vomiting, diarrhea   Genitourinary: Negative for hematuria   Musculoskeletal: Negative for arthralgias   Skin: Negative for rash  Neurological: Negative for syncope  Hematological: Negative for adenopathy  Psychiatric/Behavorial: Negative for anxiety    PHYSICAL EXAM:  Blood pressure 122/69, pulse 70, temperature 96.9 °F (36.1 °C), temperature source Oral, resp. rate 18, height 5' 8\" (1.727 m), weight 191 lb (86.6 kg), SpO2 95 %.' on RA  Gen: No distress. Normocephalic, atraumatic. Ill appearing. Eyes: PERRL. No sclera icterus. No conjunctival injection. ENT: No discharge. Pharynx clear. Neck: Trachea midline. No obvious mass. Resp: No accessory muscle use. No crackles. few wheezes. No rhonchi. No dullness on percussion. CV: Regular rate. Regular rhythm. No murmur or rub. No edema. Peripheral pulses are 2+. Capillary refill is less than 3 seconds. GI: Non-tender. Non-distended. No hernia. Skin: Warm and dry. No nodule on exposed extremities. Lymph: No cervical LAD. No supraclavicular LAD. M/S: No cyanosis. No joint deformity. No clubbing. Neuro: Awake. Alert. Moves all four extremities. LABS:  CBC:   Recent Labs     08/09/20 2050 08/11/20 0620   WBC 18.9* 14.0*   HGB 13.7 12.6*   HCT 43.6 39.6*   MCV 90.4 91.5    253     BMP:   Recent Labs     08/09/20 2050 08/11/20 0620    136   K 5.5* 4.5    101   CO2 28 25   BUN 32* 41*   CREATININE 1.8* 1.8*     LIVER PROFILE:   Recent Labs     08/09/20 2050   AST 16   ALT 15   BILITOT 1.4*   ALKPHOS 126     PT/INR: No results for input(s): PROTIME, INR in the last 72 hours. APTT: No results for input(s):  APTT in the last 72 hours. UA:No results for input(s): NITRITE, COLORU, PHUR, LABCAST, WBCUA, RBCUA, MUCUS, TRICHOMONAS, YEAST, BACTERIA, CLARITYU, SPECGRAV, LEUKOCYTESUR, UROBILINOGEN, BILIRUBINUR, BLOODU, GLUCOSEU, AMORPHOUS in the last 72 hours. Invalid input(s): KETONESU  No results for input(s): PHART, JOP7AAL, PO2ART in the last 72 hours. Chest imaging was reviewed by me and showed:  CXR 8/9: diffuse L sided airspace consolidation    ASSESSMENT:  · Left-sided pneumonia-came from nursing home so is at risk for gram-negative and MRSA pathogens  · History of COPD  · CKD stage III  · Leukocytosis    PLAN:  · Agree with broad-spectrum antibiotics including vancomycin, cefepime and azithromycin  · Await follow-up cultures  · Covid 19 testing and respiratory droplet isolation is appropriate   · I would not discharge patient until cover testing is returned    Thank you for the consult.

## 2020-08-11 NOTE — PROGRESS NOTES
MD notified via [facility] that troponin level did trend back up with recheck this evening. Pt in no distress at this time and no complaints of pain. Pt denies needs at this time, call light within reach, bed alarm in place. Will continue to monitor.   Rene Escobar RN

## 2020-08-12 LAB
GLUCOSE BLD-MCNC: 116 MG/DL (ref 70–99)
GLUCOSE BLD-MCNC: 120 MG/DL (ref 70–99)
GLUCOSE BLD-MCNC: 126 MG/DL (ref 70–99)
GLUCOSE BLD-MCNC: 140 MG/DL (ref 70–99)
GLUCOSE BLD-MCNC: 193 MG/DL (ref 70–99)
PERFORMED ON: ABNORMAL
SARS-COV-2, NAA: NOT DETECTED
VANCOMYCIN RANDOM: 14.1 UG/ML

## 2020-08-12 PROCEDURE — 6370000000 HC RX 637 (ALT 250 FOR IP): Performed by: PHYSICIAN ASSISTANT

## 2020-08-12 PROCEDURE — 94640 AIRWAY INHALATION TREATMENT: CPT

## 2020-08-12 PROCEDURE — 80202 ASSAY OF VANCOMYCIN: CPT

## 2020-08-12 PROCEDURE — 36415 COLL VENOUS BLD VENIPUNCTURE: CPT

## 2020-08-12 PROCEDURE — 6360000002 HC RX W HCPCS: Performed by: INTERNAL MEDICINE

## 2020-08-12 PROCEDURE — 94761 N-INVAS EAR/PLS OXIMETRY MLT: CPT

## 2020-08-12 PROCEDURE — 2580000003 HC RX 258

## 2020-08-12 PROCEDURE — 1200000000 HC SEMI PRIVATE

## 2020-08-12 PROCEDURE — 2580000003 HC RX 258: Performed by: PHYSICIAN ASSISTANT

## 2020-08-12 PROCEDURE — 2580000003 HC RX 258: Performed by: INTERNAL MEDICINE

## 2020-08-12 PROCEDURE — 99232 SBSQ HOSP IP/OBS MODERATE 35: CPT | Performed by: INTERNAL MEDICINE

## 2020-08-12 PROCEDURE — 2700000000 HC OXYGEN THERAPY PER DAY

## 2020-08-12 PROCEDURE — 99233 SBSQ HOSP IP/OBS HIGH 50: CPT | Performed by: INTERNAL MEDICINE

## 2020-08-12 PROCEDURE — 6360000002 HC RX W HCPCS: Performed by: PHYSICIAN ASSISTANT

## 2020-08-12 RX ORDER — SODIUM CHLORIDE 9 MG/ML
INJECTION, SOLUTION INTRAVENOUS
Status: COMPLETED
Start: 2020-08-12 | End: 2020-08-12

## 2020-08-12 RX ADMIN — ATORVASTATIN CALCIUM 40 MG: 40 TABLET, FILM COATED ORAL at 09:06

## 2020-08-12 RX ADMIN — Medication 2 PUFF: at 08:00

## 2020-08-12 RX ADMIN — FUROSEMIDE 20 MG: 20 TABLET ORAL at 09:07

## 2020-08-12 RX ADMIN — CEFEPIME 2 G: 2 INJECTION, POWDER, FOR SOLUTION INTRAMUSCULAR; INTRAVENOUS at 09:08

## 2020-08-12 RX ADMIN — AMIODARONE HYDROCHLORIDE 100 MG: 200 TABLET ORAL at 09:06

## 2020-08-12 RX ADMIN — PANTOPRAZOLE SODIUM 40 MG: 40 TABLET, DELAYED RELEASE ORAL at 09:06

## 2020-08-12 RX ADMIN — AZITHROMYCIN 500 MG: 500 INJECTION, POWDER, LYOPHILIZED, FOR SOLUTION INTRAVENOUS at 22:26

## 2020-08-12 RX ADMIN — DULOXETINE HYDROCHLORIDE 60 MG: 60 CAPSULE, DELAYED RELEASE ORAL at 09:06

## 2020-08-12 RX ADMIN — ASPIRIN 81 MG CHEWABLE TABLET 81 MG: 81 TABLET CHEWABLE at 09:05

## 2020-08-12 RX ADMIN — INSULIN GLARGINE 20 UNITS: 100 INJECTION, SOLUTION SUBCUTANEOUS at 21:34

## 2020-08-12 RX ADMIN — FINASTERIDE 5 MG: 5 TABLET, FILM COATED ORAL at 09:06

## 2020-08-12 RX ADMIN — ENOXAPARIN SODIUM 30 MG: 30 INJECTION SUBCUTANEOUS at 09:08

## 2020-08-12 RX ADMIN — VANCOMYCIN HYDROCHLORIDE 1000 MG: 1 INJECTION, POWDER, LYOPHILIZED, FOR SOLUTION INTRAVENOUS at 23:46

## 2020-08-12 RX ADMIN — Medication 10 ML: at 22:26

## 2020-08-12 RX ADMIN — CEFEPIME 2 G: 2 INJECTION, POWDER, FOR SOLUTION INTRAMUSCULAR; INTRAVENOUS at 22:26

## 2020-08-12 RX ADMIN — METOPROLOL SUCCINATE 12.5 MG: 25 TABLET, EXTENDED RELEASE ORAL at 09:07

## 2020-08-12 RX ADMIN — Medication 10 ML: at 09:08

## 2020-08-12 RX ADMIN — GABAPENTIN 300 MG: 300 CAPSULE ORAL at 09:06

## 2020-08-12 RX ADMIN — SODIUM CHLORIDE 250 ML: 9 INJECTION, SOLUTION INTRAVENOUS at 22:27

## 2020-08-12 NOTE — PROGRESS NOTES
Progress Note    Admit Date:  8/9/2020    Admitted for PNA and COVID-19 rule out . Chest x-ray with multifocal airspace disease. COVID-19 negative     Subjective:  Mr. Giovany Burrell  Is stable and afebrile. He feels much better today. He is on room air. Objective:   Vitals:    08/12/20 1015   BP:    Pulse:    Resp:    Temp:    SpO2: 94%       Intake/Output Summary (Last 24 hours) at 8/12/2020 1102  Last data filed at 8/12/2020 0730  Gross per 24 hour   Intake 523 ml   Output 700 ml   Net -177 ml       Physical Exam:  CNS:  Alert, awake, and oriented. PSYCH:  The patient is cooperative, answering questions appropriately. EYES:  Pupils are bilaterally equal.  ENT:  No oral mucosa lesions. RESPIRATORY SYSTEM:   Clear to auscultation , no wheezes rales or rhonchi . CVS:   Regular rate and rhythm. ABDOMEN:  Soft. No evidence of distension. MUSCULOSKELETAL:  No acute obvious musculoskeletal deformities. SKIN:  The patient has got a midline biceps scar over the chest skin that is old and healed well.   NEURO : Nonfocal       Scheduled Meds:   sodium chloride flush  10 mL Intravenous 2 times per day    enoxaparin  30 mg Subcutaneous Daily    azithromycin  500 mg Intravenous Q24H    cefepime  2 g Intravenous Q12H    amiodarone  100 mg Oral Daily    aspirin  81 mg Oral Daily    atorvastatin  40 mg Oral Daily    DULoxetine  60 mg Oral Daily    finasteride  5 mg Oral Daily    FLUoxetine  10 mg Oral Nightly    budesonide-formoterol  2 puff Inhalation BID    furosemide  20 mg Oral Daily    gabapentin  300 mg Oral BID    insulin glargine  20 Units Subcutaneous Nightly    metoprolol succinate  12.5 mg Oral Daily    pantoprazole  40 mg Oral QAM AC    insulin lispro  0-6 Units Subcutaneous TID WC    insulin lispro  0-3 Units Subcutaneous Nightly    vancomycin (VANCOCIN) intermittent dosing (placeholder)   Other RX Placeholder       Continuous Infusions:   dextrose         PRN Meds:  sodium chloride flush, promethazine **OR** ondansetron, albuterol sulfate HFA, ipratropium, glucose, dextrose, glucagon (rDNA), dextrose      Data:  CBC:   Recent Labs     08/09/20 2050 08/11/20  0620   WBC 18.9* 14.0*   HGB 13.7 12.6*   HCT 43.6 39.6*   MCV 90.4 91.5    253     BMP:   Recent Labs     08/09/20 2050 08/11/20  0620    136   K 5.5* 4.5    101   CO2 28 25   BUN 32* 41*   CREATININE 1.8* 1.8*     LIVER PROFILE:   Recent Labs     08/09/20 2050   AST 16   ALT 15   BILITOT 1.4*   ALKPHOS 126     Pro-BNP  3,539High      Troponin  0. 04High       D-Dimer, Quant  744High      Procalcitonin  0.14      CULTURES  COVID-19: pending      RADIOLOGY  XR CHEST PORTABLE   Final Result   Multifocal left midlung consolidation, most consistent with pneumonia,   consider a bacterial origin. Small bilateral pleural effusions. Radiographic follow-up to resolution is recommended. Assessment/Plan:  Acute hypoxic respiratory failure  - Does not use supplemental O2 at home   - Was requiring up to 4L NC--> now on 1L --> wean as tolerated to room air  - Suspect 2/2 multifocal PNA, see below  - COVID 19 negative. - pulmonology consulted    Multifocal PNA  - CXR shows bilateral effusions with multifocal PNA  - Concerns for gram neg organism with risk for MRSA (patient is from SNF), continue Cefepime/Azithromycin. Vancomycin added  - PCT is WNL  - Pulmonology consult  Discussed with pulmonologist.   continue current IV antibiotics and monitor today    COPD  - not reported as AE      Leukocytosis   - suspect 2/2 HCAP   - Trend      Hyperkalemia  - K+ on admission 5.5.   Repeat potassium levels are normal  - EKG without acute change    CKD Stage III   - Baseline creatinine ~1.6   - Creatinine on admission 1.8  - stable     Elevated troponin   - paced rhythm on EKG  - No acute CP complaints per chart  - may be related to CKD  - Continue tele monitor  - Trend troponin   - PRN EKG     DM2 with neuropathy   - BG

## 2020-08-12 NOTE — PLAN OF CARE
Problem: Falls - Risk of:  Goal: Will remain free from falls  Description: Will remain free from falls  Outcome: Ongoing     Problem: Airway Clearance - Ineffective:  Goal: Ability to maintain a clear airway will improve  Description: Ability to maintain a clear airway will improve  Outcome: Ongoing     Problem: Gas Exchange - Impaired:  Goal: Levels of oxygenation will improve  Description: Levels of oxygenation will improve  Outcome: Ongoing     Problem: Daily Care:  Goal: Daily care needs are met  Description: Daily care needs are met  Outcome: Ongoing

## 2020-08-12 NOTE — PROGRESS NOTES
Vancomycin Day: 3    Patient's labs, cultures, vitals, and vancomycin regimen reviewed. Vanc random today 14.1mcg/ml. Will reorder 1gm today. Random at noon tomorrow  Tito MUHAMMAD 8/12/202012:50 PM  .    .

## 2020-08-12 NOTE — PROGRESS NOTES
Pt telemetry box not picking up rhythm; telemetry box replaced with new, still not working, leads replaced, still not able to  rhythm. Pt is A&O at this time, withdrawn and sad tonight but not suicidal, just tired and in tears. MD messaged about difficulty obtaining telemetry monitoring, awaiting response. Pt has call light within reach, bed alarm refused. Will continue to monitor.   Carlitos Chacko RN

## 2020-08-12 NOTE — PROGRESS NOTES
Pulmonary Progress Note    CC: pneumonia    Subjective:   Patient feels lethargic. Intake/Output Summary (Last 24 hours) at 8/12/2020 0901  Last data filed at 8/12/2020 0730  Gross per 24 hour   Intake 623 ml   Output 900 ml   Net -277 ml       Exam:   BP (!) 97/58   Pulse 70   Temp 97.8 °F (36.6 °C) (Oral)   Resp 16   Ht 5' 8\" (1.727 m)   Wt 191 lb (86.6 kg)   SpO2 92%   BMI 29.04 kg/m²  on RA  Gen: No distress. lethargic  Eyes: PERRL. No sclera icterus. No conjunctival injection. ENT: No discharge. Pharynx clear. Neck: Trachea midline. Normal thyroid. Resp: No accessory muscle use. No crackles. No wheezes. No rhonchi. No dullness on percussion. CV: Regular rate. Regular rhythm. No murmur or rub. No edema. GI: Non-tender. Non-distended. No masses. No organomegaly. Normal bowel sounds. No hernia. Skin: Warm and dry. No nodule on exposed extremities. No rash on exposed extremities. Lymph: No cervical LAD. No supraclavicular LAD. M/S: No cyanosis. No joint deformity. No clubbing. Neuro: somnolent but awakes. Moves all extremities. CN grossly intact.       Scheduled Meds:   sodium chloride flush  10 mL Intravenous 2 times per day    enoxaparin  30 mg Subcutaneous Daily    azithromycin  500 mg Intravenous Q24H    cefepime  2 g Intravenous Q12H    amiodarone  100 mg Oral Daily    aspirin  81 mg Oral Daily    atorvastatin  40 mg Oral Daily    DULoxetine  60 mg Oral Daily    finasteride  5 mg Oral Daily    FLUoxetine  10 mg Oral Nightly    budesonide-formoterol  2 puff Inhalation BID    furosemide  20 mg Oral Daily    gabapentin  300 mg Oral BID    insulin glargine  20 Units Subcutaneous Nightly    metoprolol succinate  12.5 mg Oral Daily    pantoprazole  40 mg Oral QAM AC    insulin lispro  0-6 Units Subcutaneous TID WC    insulin lispro  0-3 Units Subcutaneous Nightly    vancomycin (VANCOCIN) intermittent dosing (placeholder)   Other RX Placeholder     Continuous Infusions:   dextrose       PRN Meds:  sodium chloride flush, promethazine **OR** ondansetron, albuterol sulfate HFA, ipratropium, glucose, dextrose, glucagon (rDNA), dextrose    Labs:  CBC:   Recent Labs     08/09/20 2050 08/11/20  0620   WBC 18.9* 14.0*   HGB 13.7 12.6*   HCT 43.6 39.6*   MCV 90.4 91.5    253     BMP:   Recent Labs     08/09/20 2050 08/11/20  0620    136   K 5.5* 4.5    101   CO2 28 25   BUN 32* 41*   CREATININE 1.8* 1.8*     LIVER PROFILE:   Recent Labs     08/09/20 2050   AST 16   ALT 15   BILITOT 1.4*   ALKPHOS 126     PT/INR: No results for input(s): PROTIME, INR in the last 72 hours. APTT: No results for input(s): APTT in the last 72 hours. UA:No results for input(s): NITRITE, COLORU, PHUR, LABCAST, WBCUA, RBCUA, MUCUS, TRICHOMONAS, YEAST, BACTERIA, CLARITYU, SPECGRAV, LEUKOCYTESUR, UROBILINOGEN, BILIRUBINUR, BLOODU, GLUCOSEU, AMORPHOUS in the last 72 hours. Invalid input(s): KETONESU  No results for input(s): PHART, WHT6GIR, PO2ART in the last 72 hours.   Cultures:   sars Cov2 8/9 PCR-negative  Films:  CXR 8/9: diffuse dense L sided airspace consolidation     ASSESSMENT:  · Left-sided pneumonia-came from nursing home so is at risk for gram-negative and MRSA pathogens  · History of COPD  · CKD stage III  · Leukocytosis     PLAN:  · Agree with broad-spectrum antibiotics including D3 vancomycin, cefepime and azithromycin  · Await follow-up cultures  · D/c resp droplet plus isolation

## 2020-08-12 NOTE — CARE COORDINATION
INTERDISCIPLINARY PLAN OF CARE CONFERENCE    Date/Time: 8/12/2020 11:32 AM  Completed by: Herlinda Mcfarland, Case Management      Patient Name:  Hebert Fox  YOB: 1934  Admitting Diagnosis: Multifocal pneumonia [J18.9]     Admit Date/Time:  8/9/2020  8:45 PM    Chart reviewed. Interdisciplinary team met to discuss patient progress and discharge plans. Disciplines included Case Management, Nursing, and Dietitian. Current Status:stable; COVID negative    PT/OT recommendation:NA    Anticipated Discharge Date: tbd  Expected D/C Disposition:  Nursing Home  Confirmed plan with patient and/or family-pt   Discharge Plan Comments: Chart reviewed, met with pt at bedside, plan continues for pt to return to [facility]. TC to [place] @ [facility] +bedhold      COVID from 8/9 Not detected      The Plan for Transition of Care is related to the following treatment goals: LTC @ [facility]    The Patient and/or patient representative --pt was provided with a choice of provider and agrees   with the discharge plan. [x] Yes [] No    Freedom of choice list was provided with basic dialogue that supports the patient's individualized plan of care/goals, treatment preferences and shares the quality data associated with the providers.  [] Yes [x] No    Home O2 in place on admit: ecf  Pt informed of need to bring portable home O2 tank on day of discharge for nursing to connect prior to leaving:  Not Indicated  Verbalized agreement/Understanding:  Not Indicated

## 2020-08-12 NOTE — PROGRESS NOTES
Telemetry and continuous pulse ox dc/d by [facility] at this time. Pt removed from telemetry and continuous pulse ox, denies needs at this time. Call light within reach, bed alarm refused. Will continue to monitor.   Aries Carranza RN

## 2020-08-13 LAB
ANION GAP SERPL CALCULATED.3IONS-SCNC: 13 MMOL/L (ref 3–16)
BUN BLDV-MCNC: 42 MG/DL (ref 7–20)
CALCIUM SERPL-MCNC: 9.4 MG/DL (ref 8.3–10.6)
CHLORIDE BLD-SCNC: 99 MMOL/L (ref 99–110)
CO2: 27 MMOL/L (ref 21–32)
CREAT SERPL-MCNC: 1.7 MG/DL (ref 0.8–1.3)
GFR AFRICAN AMERICAN: 46
GFR NON-AFRICAN AMERICAN: 38
GLUCOSE BLD-MCNC: 101 MG/DL (ref 70–99)
GLUCOSE BLD-MCNC: 102 MG/DL (ref 70–99)
GLUCOSE BLD-MCNC: 105 MG/DL (ref 70–99)
GLUCOSE BLD-MCNC: 108 MG/DL (ref 70–99)
GLUCOSE BLD-MCNC: 116 MG/DL (ref 70–99)
PERFORMED ON: ABNORMAL
POTASSIUM SERPL-SCNC: 4.3 MMOL/L (ref 3.5–5.1)
SODIUM BLD-SCNC: 139 MMOL/L (ref 136–145)
VANCOMYCIN TROUGH: 16.8 UG/ML (ref 10–20)

## 2020-08-13 PROCEDURE — 90792 PSYCH DIAG EVAL W/MED SRVCS: CPT | Performed by: NURSE PRACTITIONER

## 2020-08-13 PROCEDURE — 99232 SBSQ HOSP IP/OBS MODERATE 35: CPT | Performed by: INTERNAL MEDICINE

## 2020-08-13 PROCEDURE — 6370000000 HC RX 637 (ALT 250 FOR IP): Performed by: PHYSICIAN ASSISTANT

## 2020-08-13 PROCEDURE — 2580000003 HC RX 258: Performed by: INTERNAL MEDICINE

## 2020-08-13 PROCEDURE — 80048 BASIC METABOLIC PNL TOTAL CA: CPT

## 2020-08-13 PROCEDURE — 1200000000 HC SEMI PRIVATE

## 2020-08-13 PROCEDURE — 2580000003 HC RX 258: Performed by: PHYSICIAN ASSISTANT

## 2020-08-13 PROCEDURE — 80202 ASSAY OF VANCOMYCIN: CPT

## 2020-08-13 PROCEDURE — 6360000002 HC RX W HCPCS: Performed by: INTERNAL MEDICINE

## 2020-08-13 PROCEDURE — 6360000002 HC RX W HCPCS: Performed by: PHYSICIAN ASSISTANT

## 2020-08-13 PROCEDURE — 36415 COLL VENOUS BLD VENIPUNCTURE: CPT

## 2020-08-13 RX ADMIN — Medication 10 ML: at 22:04

## 2020-08-13 RX ADMIN — CEFEPIME 2 G: 2 INJECTION, POWDER, FOR SOLUTION INTRAMUSCULAR; INTRAVENOUS at 10:19

## 2020-08-13 RX ADMIN — AZITHROMYCIN 500 MG: 500 INJECTION, POWDER, LYOPHILIZED, FOR SOLUTION INTRAVENOUS at 22:39

## 2020-08-13 RX ADMIN — ENOXAPARIN SODIUM 30 MG: 30 INJECTION SUBCUTANEOUS at 09:42

## 2020-08-13 RX ADMIN — GABAPENTIN 300 MG: 300 CAPSULE ORAL at 21:55

## 2020-08-13 RX ADMIN — FLUOXETINE 10 MG: 10 CAPSULE ORAL at 21:56

## 2020-08-13 RX ADMIN — VANCOMYCIN HYDROCHLORIDE 1000 MG: 1 INJECTION, POWDER, LYOPHILIZED, FOR SOLUTION INTRAVENOUS at 23:43

## 2020-08-13 RX ADMIN — CEFEPIME 2 G: 2 INJECTION, POWDER, FOR SOLUTION INTRAMUSCULAR; INTRAVENOUS at 21:55

## 2020-08-13 ASSESSMENT — PAIN SCALES - GENERAL: PAINLEVEL_OUTOF10: 0

## 2020-08-13 NOTE — PROGRESS NOTES
Per request of MD, [address] [facility] called to verify patients baseline status as far as his behavior/eating/med administration, as he is now refusing meds, food, and is not at all verbal, only expressive with grunting, combative with care, etc. Melida Michaels RN at [facility] confirmed that patient is normally alert and oriented, takes his own shower, does his own care, feeds himself, and is normally oriented. She was described his current behaviors and she state this is nothing he has done at the [facility] before. MD updated.

## 2020-08-13 NOTE — PROGRESS NOTES
Physician Progress Note      Dyllan Alford  [facility] #:                  [id number]  :                       1934  ADMIT DATE:       2020 8:45 PM  [address] DATE:  RESPONDING  PROVIDER #:        Renny Goodson MD          QUERY TEXT:    Pt admitted with pneumonia. If possible, please document in the progress notes   and discharge summary if you are evaluating and/or treating any of the   following: The medical record reflects the following:  Risk Factors: pneumonia  Clinical Indicators: Concerns for gram neg organism with risk for MRSA   (patient is from SNF), PCT is WNL, CXR-Multifocal left midlung consolidation,   most consistent with pneumonia, consider a bacterial origin. Treatment: cefepime, Zithromax, vancomycin, pulmonology consult    Thank Edwina Beltran RN, [facility] [phone]  Options provided:  -- Gram negative pneumonia  -- Bacterial pneumonia  -- Other - I will add my own diagnosis  -- Disagree - Not applicable / Not valid  -- Disagree - Clinically unable to determine / Unknown  -- Refer to Clinical Documentation Reviewer    PROVIDER RESPONSE TEXT:    This patient has bacterial pneumonia.     Query created by: Joselyn Courtney on 2020 1:58 PM      Electronically signed by:  Renny Goodson MD 2020 5:13 PM

## 2020-08-13 NOTE — PROGRESS NOTES
RESPIRATORY THERAPY ASSESSMENT    Name:  Jeffy [facility] Record Number:  [id number]  Age: 80 y.o. Gender: male  : 1934  Today's Date:  2020  Room:  0228/0228-01    Assessment     Is the patient being admitted for a COPD or Asthma exacerbation? No   (If yes the patient will be seen every 4 hours for the first 24 hours and then reassessed)    Patient Admission Diagnosis      Allergies  Allergies   Allergen Reactions    No Known Allergies        Minimum Predicted Vital Capacity:     n/a          Actual Vital Capacity:      n/a              Pulmonary History:COPD  Home Oxygen Therapy:  2 liters/min via nasal cannula  Home Respiratory Therapy:Salmeterol/Fluticasone Propionate   Current Respiratory Therapy:  Alb/Atr prn  Treatment Type: MDI, Treatment missed  Medications: Budesonide/Formoterol    Respiratory Severity Index(RSI)   Patients with orders for inhalation medications, oxygen, or any therapeutic treatment modality will be placed on Respiratory Protocol. They will be assessed with the first treatment and at least every 72 hours thereafter. The following severity scale will be used to determine frequency of treatment intervention.     Smoking History: Pulmonary Disease or Smoking History, Greater than 15 pack year = 2    Social History  Social History     Tobacco Use    Smoking status: Former Smoker    Smokeless tobacco: Never Used    Tobacco comment: prior to    Substance Use Topics    Alcohol use: No    Drug use: No       Recent Surgical History: None = 0  Past Surgical History  Past Surgical History:   Procedure Laterality Date    CARDIAC SURGERY      aortocoronary bypass    COLONOSCOPY  2019    Incomplete colonoscopy; Poor prep    COLONOSCOPY N/A 2019    COLON W/ANES. (8:00) (DAUGHTER,JANIA, IS POA, WILL BE HERE FOR PROCEDURE) performed by Montserrat Cassidy DO at [address] [place] [address]  2019    colon;    COLONOSCOPY N/A 2019 COLONOSCOPY POLYPECTOMY SNARE/COLD BIOPSY performed by Karl Royal DO at [address]      TOE AMPUTATION Left 10/10/2019    PARTIAL FOOT AMPUTATION (Left great toe)    TOE AMPUTATION Left 10/10/2019    PARTIAL FOOT AMPUTATION performed by Kemal Smith DPM at [facility] OR       Level of Consciousness: Disoriented and uncooperative = 2    Level of Activity: Walking with assistance = 1    Respiratory Pattern: Regular Pattern; RR 8-20 = 0    Breath Sounds: Clear = 0    Sputum   ,  , Sputum How Obtained: Spontaneous cough  Cough: Strong, spontaneous, non-productive = 0    Vital Signs   BP (!) 159/87   Pulse 79   Temp 97.3 °F (36.3 °C) (Oral)   Resp 20   Ht 5' 8\" (1.727 m)   Wt 177 lb 4.8 oz (80.4 kg)   SpO2 96%   BMI 26.96 kg/m²   SPO2 (COPD values may differ): Greater than or equal to 92% on room air = 0    Peak Flow (asthma only): not applicable = 0    RSI: 5-6 = Q4hr PRN (every four hours as needed) for dyspnea        Plan       Goals: medication delivery if needed    Patient/caregiver was educated on the proper method of use for Respiratory Care Devices:  No: pt disoriented      Level of patient/caregiver understanding able to:   ? Verbalize understanding   ? Demonstrate understanding       ? Teach back        ? Needs reinforcement       ? No available caregiver               ? Other:     Response to education:  n/a     Is patient being placed on Home Treatment Regimen? No     Does the patient have everything they need prior to discharge? Yes     Comments: pt assessed/chart reviewed    Plan of Care: Continue prn    Electronically signed by Luis Angel Del Rio RCP on 8/13/2020 at 5:34 PM    Respiratory Protocol Guidelines     1. Assessment and treatment by Respiratory Therapy will be initiated for medication and therapeutic interventions upon initiation of aerosolized medication.   2. Physician will be contacted for respiratory rate (RR) greater than 35 breaths per minute. Therapy will be held for heart rate (HR) greater than 140 beats per minute, pending direction from physician. 3. Bronchodilators will be administered via Metered Dose Inhaler (MDI) with spacer when the following criteria are met:  a. Alert and cooperative     b. HR < 140 bpm  c. RR < 30 bpm                d. Can demonstrate a 2-3 second inspiratory hold  4. Bronchodilators will be administered via Hand Held Nebulizer [facility] to patients when ANY of the following criteria are met  a. Incognizant or uncooperative          b. Patients treated with HHN at Home        c. Unable to demonstrate proper use of MDI with spacer     d. RR > 30 bpm   5. Bronchodilators will be delivered via Metered Dose Inhaler (MDI), HHN, Aerogen to intubated patients on mechanical ventilation. 6. Inhalation medication orders will be delivered and/or substituted as outlined below. Aerosolized Medications Ordering and Administration Guidelines:    1. All Medications will be ordered by a physician, and their frequency and/or modality will be adjusted as defined by the patients Respiratory Severity Index (RSI) score. 2. If the patient does not have documented COPD, consider discontinuing anticholinergics when RSI is less than 9.  3. If the bronchospasm worsens (increased RSI), then the bronchodilator frequency can be increased to a maximum of every 4 hours. If greater than every 4 hours is required, the physician will be contacted. 4. If the bronchospasm improves, the frequency of the bronchodilator can be decreased, based on the patient's RSI, but not less than home treatment regimen frequency. 5. Bronchodilator(s) will be discontinued if patient has a RSI less than 9 and has received no scheduled or as needed treatment for 72  Hrs. Patients Ordered on a Mucolytic Agent:    1. Must always be administered with a bronchodilator.     2. Discontinue if patient experiences worsened bronchospasm, or secretions have lessened to the point that the patient is able to clear them with a cough. Anti-inflammatory and Combination Medications:    1. If the patient lacks prior history of lung disease, is not using inhaled anti-inflammatory medication at home, and lacks wheezing by examination or by history for at least 24 hours, contact physician for possible discontinuation.

## 2020-08-13 NOTE — PROGRESS NOTES
Progress Note    Admit Date:  8/9/2020    Admitted for PNA and COVID-19 rule out . Chest x-ray with multifocal airspace disease. COVID-19 negative     Subjective:  Mr. Harvey Bruroughs  Is stable and afebrile. He feels much better today. He is on room air. 8/13- he is more withdrawn and not wanting to eat and refusing his medication. Objective:   Vitals:    08/13/20 0930   BP: (!) 145/85   Pulse: 75   Resp: 22   Temp: 98.9 °F (37.2 °C)   SpO2: 92%       Intake/Output Summary (Last 24 hours) at 8/13/2020 1024  Last data filed at 8/13/2020 0920  Gross per 24 hour   Intake 10 ml   Output --   Net 10 ml       Physical Exam:  CNS:  awake but refuses to answer questions. PSYCH:  The patient is cooperative, answering questions appropriately. EYES:  Pupils are bilaterally equal.  ENT:  No oral mucosa lesions. RESPIRATORY SYSTEM:   Clear to auscultation , no wheezes rales or rhonchi . CVS:   Regular rate and rhythm. ABDOMEN:  Soft. No evidence of distension. MUSCULOSKELETAL:  No acute obvious musculoskeletal deformities. SKIN:  The patient has got a midline biceps scar over the chest skin that is old and healed well.   NEURO : Nonfocal       Scheduled Meds:   sodium chloride flush  10 mL Intravenous 2 times per day    enoxaparin  30 mg Subcutaneous Daily    azithromycin  500 mg Intravenous Q24H    cefepime  2 g Intravenous Q12H    amiodarone  100 mg Oral Daily    aspirin  81 mg Oral Daily    atorvastatin  40 mg Oral Daily    DULoxetine  60 mg Oral Daily    finasteride  5 mg Oral Daily    FLUoxetine  10 mg Oral Nightly    budesonide-formoterol  2 puff Inhalation BID    furosemide  20 mg Oral Daily    gabapentin  300 mg Oral BID    insulin glargine  20 Units Subcutaneous Nightly    metoprolol succinate  12.5 mg Oral Daily    pantoprazole  40 mg Oral QAM AC    insulin lispro  0-6 Units Subcutaneous TID WC    insulin lispro  0-3 Units Subcutaneous Nightly    vancomycin (VANCOCIN) intermittent dosing (placeholder)   Other RX Placeholder       Continuous Infusions:   dextrose         PRN Meds:  sodium chloride flush, promethazine **OR** ondansetron, albuterol sulfate HFA, ipratropium, glucose, dextrose, glucagon (rDNA), dextrose      Data:  CBC:   Recent Labs     08/11/20  0620   WBC 14.0*   HGB 12.6*   HCT 39.6*   MCV 91.5        BMP:   Recent Labs     08/11/20  0620 08/13/20  0525    139   K 4.5 4.3    99   CO2 25 27   BUN 41* 42*   CREATININE 1.8* 1.7*     LIVER PROFILE:   No results for input(s): AST, ALT, LIPASE, BILIDIR, BILITOT, ALKPHOS in the last 72 hours. Invalid input(s): AMYLASE,  ALB  Pro-BNP  3,539High      Troponin  0. 04High       D-Dimer, Quant  744High      Procalcitonin  0.14      CULTURES  COVID-19: pending      RADIOLOGY  XR CHEST PORTABLE   Final Result   Multifocal left midlung consolidation, most consistent with pneumonia,   consider a bacterial origin. Small bilateral pleural effusions. Radiographic follow-up to resolution is recommended. Assessment/Plan:  Acute hypoxic respiratory failure  - Does not use supplemental O2 at home   - Was requiring up to 4L NC--> now on 1L --> wean as tolerated to room air  - Suspect 2/2 multifocal PNA, see below  - COVID 19 negative. - pulmonology consulted    Multifocal PNA  - CXR shows bilateral effusions with multifocal PNA  - Concerns for gram neg organism with risk for MRSA (patient is from SNF), continue Cefepime/Azithromycin. Vancomycin added  - PCT is WNL  - Pulmonology consult  Discussed with pulmonologist.   continue current IV antibiotics and monitor today    COPD  - not reported as AE      Leukocytosis   - suspect 2/2 HCAP   - Trend      Hyperkalemia  - K+ on admission 5.5.   Repeat potassium levels are normal  - EKG without acute change    CKD Stage III   - Baseline creatinine ~1.6   - Creatinine on admission 1.8  - stable     Elevated troponin   - paced rhythm on EKG  - No acute CP complaints per chart  - may be related to CKD  - Continue tele monitor  - Trend troponin   - PRN EKG     DM2 with neuropathy   - BG is elevated   - Reorder home Lantus and SSI   - Carb control diet  - Continue Neurontin   - Monitor     HTN  - BP is controlled  - Continue home medications     HLD  - Continue statin     Depression   - Continue home medications     Atrial fibrillation   - Not on AC   - Continue home rate controllers  - Rate controlled, paced rhythm   - Continue to monitor     PAD  - follows with Vascular surgery   - s/p bilateral leg angioplasties after chronic non-healing wounds  - Continue home     AAA  - 4.3 cm on last duplex   - Follows with vascular  - Was due for surveillance imaging in Feb 2020     Chronic diabetic foot ulcer  - has followed with Dr. Elba Clayton and wound care   - s/p left hallux amputation  - Continue wound care for now   - Consult podiatry if wound integrity worsens     He is more withdrawn. He is refusing medication. He is not wanting to eat. Consult [facility] psychiatry.       DVT Prophylaxis: Lovenox   Diet: DIET CARDIAC; Carb Control: 4 carb choices (60 gms)/meal  Code Status: DNR-CCA        Mayda Vick 10:24 AM 8/13/2020

## 2020-08-13 NOTE — CONSULTS
Psychiatric consult completed. Chart was reviewed, patient was seen, collateral information was obtained from patient's daughter and [facility], and case was reviewed with Dr. Katelyn Ghotra. Per multiple reliable sources, patient is alert and oriented x4 at baseline. He is reportedly independent with his ADLs and has a healthy appetite. OV reports that he takes his medications with no issues. The change in his presentation was an acute change from baseline with sudden onset which supports that diagnosis of hypoactive delirium. At this time patient does not meet criteria for inpatient psychiatric admission. It is not recommended starting any psychotropic medications at this time as it is suspected that patient's presentation will improve as his medical status improves. If the patient were to present with mixed delirium (psychomotor agitation, restlessness, hallucinations), he can be started on low-dose Haldol 0.5 mg PO or IM Q6 PRN. Limit medications that can worsen delirium. Dx: Delirium, hypoactive    If there is any additional questions and concerns regarding the patient please contact [facility]. Full psychiatric consult note to follow.     Carmie Bosworth, MPH, APRN, PMHNP-BC  08/13/20

## 2020-08-13 NOTE — FLOWSHEET NOTE
Pt a/o to self, withdrawn. Pt not answering questions. Shift assessment complete. See flowsheet. Attempted to give PO meds, pt not taking PO meds. Changed & repositioned pt. Pt lying in bed in stable condition. No signs of distress. Bed in low position. Call light within reach will continue to monitor.         08/12/20 2115   Vital Signs   Temp 99.4 °F (37.4 °C)   Temp Source Oral   Pulse 77   Heart Rate Source Monitor   Resp 20   /71   BP Location Left upper arm   Patient Position Lying right side   Level of Consciousness 0   MEWS Score 1   Patient Currently in Pain No   Oxygen Therapy   SpO2 97 %   O2 Device None (Room air)

## 2020-08-13 NOTE — PROGRESS NOTES
Pulmonary Progress Note  CC: Pneumonia, shortness of breath    Subjective: Noncommunicative    IV line peripheral    EXAM:   BP (!) 145/85   Pulse 75   Temp 98.9 °F (37.2 °C) (Oral)   Resp 22   Ht 5' 8\" (1.727 m)   Wt 177 lb 4.8 oz (80.4 kg)   SpO2 92%   BMI 26.96 kg/m²  on room air  Constitutional:  No acute distress   HEENT: no scleral icterus  Neck: No tracheal deviation present. Cardiovascular: Normal heart sounds. Pulmonary/Chest: No wheezes. No rhonchi. No rales. No decreased breath sounds. No accessory muscle usage or stridor. Abdominal: Soft. Musculoskeletal: No cyanosis. No clubbing. Skin: Skin is warm and dry.    Neuro: Noncommunicative, he is awake    Scheduled Meds:   sodium chloride flush  10 mL Intravenous 2 times per day    enoxaparin  30 mg Subcutaneous Daily    azithromycin  500 mg Intravenous Q24H    cefepime  2 g Intravenous Q12H    amiodarone  100 mg Oral Daily    aspirin  81 mg Oral Daily    atorvastatin  40 mg Oral Daily    DULoxetine  60 mg Oral Daily    finasteride  5 mg Oral Daily    FLUoxetine  10 mg Oral Nightly    budesonide-formoterol  2 puff Inhalation BID    furosemide  20 mg Oral Daily    gabapentin  300 mg Oral BID    insulin glargine  20 Units Subcutaneous Nightly    metoprolol succinate  12.5 mg Oral Daily    pantoprazole  40 mg Oral QAM AC    insulin lispro  0-6 Units Subcutaneous TID WC    insulin lispro  0-3 Units Subcutaneous Nightly    vancomycin (VANCOCIN) intermittent dosing (placeholder)   Other RX Placeholder     Continuous Infusions:   dextrose       PRN Meds:  sodium chloride flush, promethazine **OR** ondansetron, albuterol sulfate HFA, ipratropium, glucose, dextrose, glucagon (rDNA), dextrose    Labs:  CBC:   Recent Labs     08/11/20  0620   WBC 14.0*   HGB 12.6*   HCT 39.6*   MCV 91.5        BMP:   Recent Labs     08/11/20  0620 08/13/20  0525    139   K 4.5 4.3    99   CO2 25 27   BUN 41* 42*   CREATININE 1.8* 1.7*       Cultures:  None    Films:  CXR 8/9: diffuse L sided airspace consolidation    ASSESSMENT:  · Left-sided HCAP  · History of COPD  · CKD stage III  · Leukocytosis  · Dementia: Baseline is nonverbal and combative    PLAN:  · Cefepime, vancomycin and azithromycin day #5. At discharge can change to p.o.  Levaquin to complete a total of 8 days of antibiotics  · Okay for discharge planning,

## 2020-08-13 NOTE — FLOWSHEET NOTE
08/13/20 0930   Vital Signs   Temp 98.9 °F (37.2 °C)   Temp Source Oral   Pulse 75   Heart Rate Source Monitor   Resp 22   BP (!) 145/85   BP Location Right leg   Oxygen Therapy   SpO2 92 %   O2 Device None (Room air)   AM assessment complete. Pt refusing am medications and breakfast. Does not verbalize or answer questions. Restless and not cooperative with changing brief/taking vitals. VS obtrained after several attempts. Lungs sounds clear diminished. s1 s2. Bandages to heels and toe in place. Bed alarm set. .clwr   No needs at this time. Will continue to monitor.

## 2020-08-13 NOTE — PROGRESS NOTES
Pt found with head at foot of bed lying down. Bed alarm is set. Fall precautions in place. Room next to RN station. Telesitter called to request camera, none available. Placed on waitlist. PCA, and HUC updated. Will increase frequency of rounds. Bed alarm set. Belongings within reach. All apparent needs met at this time. Will continue to monitor.

## 2020-08-13 NOTE — PLAN OF CARE
Problem: Falls - Risk of:  Goal: Will remain free from falls  Description: Will remain free from falls  8/13/2020 1215 by Letty Mascorro RN  Outcome: Ongoing  8/13/2020 0510 by Stefani Love RN  Outcome: Ongoing  Goal: Absence of physical injury  Description: Absence of physical injury  8/13/2020 1215 by Letty Mascorro RN  Outcome: Ongoing  8/13/2020 0510 by Stefani Love RN  Outcome: Ongoing     Problem: Discharge Planning:  Goal: Discharged to appropriate level of care  Description: Discharged to appropriate level of care  8/13/2020 1215 by Letty Mascorro RN  Outcome: Ongoing  8/13/2020 0510 by Stefani Love RN  Outcome: Ongoing  Goal: Participates in care planning  Description: Participates in care planning  8/13/2020 1215 by Letty Mascorro RN  Outcome: Ongoing  8/13/2020 0510 by Stefani Love RN  Outcome: Ongoing     Problem: Airway Clearance - Ineffective:  Goal: Clear lung sounds  Description: Clear lung sounds  8/13/2020 1215 by Letty Mascorro RN  Outcome: Ongoing  8/13/2020 0510 by Stefani Love RN  Outcome: Ongoing  Goal: Ability to maintain a clear airway will improve  Description: Ability to maintain a clear airway will improve  8/13/2020 1215 by Letty Mascorro RN  Outcome: Ongoing  8/13/2020 0510 by Stefani Love RN  Outcome: Ongoing     Problem: Safety:  Goal: Free from accidental physical injury  Description: Free from accidental physical injury  8/13/2020 1215 by Letty Mascorro RN  Outcome: Ongoing  8/13/2020 0510 by Stefani Love RN  Outcome: Ongoing     Problem: Gas Exchange - Impaired:  Goal: Levels of oxygenation will improve  Description: Levels of oxygenation will improve  8/13/2020 1215 by Letty Mascorro RN  Outcome: Ongoing  8/13/2020 0510 by Stefani Love RN  Outcome: Ongoing     Problem: Pain:  Goal: Patient's pain/discomfort is manageable  Description: Patient's pain/discomfort is manageable  8/13/2020 1215 by Letty Mascorro RN  Outcome: Ongoing  8/13/2020 0510 by Kwame Simons RN  Outcome: Ongoing     Problem: Skin Integrity:  Goal: Skin integrity will stabilize  Description: Skin integrity will stabilize  8/13/2020 1215 by Evelyne Olmos RN  Outcome: Ongoing  8/13/2020 0510 by Kwame Simons RN  Outcome: Ongoing     Problem: Skin Integrity:  Goal: Will show no infection signs and symptoms  Description: Will show no infection signs and symptoms  8/13/2020 1215 by Evelyne Olmos RN  Outcome: Ongoing  8/13/2020 0510 by Kwame Simons RN  Outcome: Ongoing  Goal: Absence of new skin breakdown  Description: Absence of new skin breakdown  8/13/2020 1215 by Evelyne Olmos RN  Outcome: Ongoing  8/13/2020 0510 by Kwame Simons RN  Outcome: Ongoing

## 2020-08-14 ENCOUNTER — APPOINTMENT (OUTPATIENT)
Dept: CT IMAGING | Age: 85
DRG: 177 | End: 2020-08-14
Payer: MEDICARE

## 2020-08-14 LAB
ANION GAP SERPL CALCULATED.3IONS-SCNC: 17 MMOL/L (ref 3–16)
BUN BLDV-MCNC: 46 MG/DL (ref 7–20)
CALCIUM SERPL-MCNC: 9.3 MG/DL (ref 8.3–10.6)
CHLORIDE BLD-SCNC: 97 MMOL/L (ref 99–110)
CO2: 22 MMOL/L (ref 21–32)
CREAT SERPL-MCNC: 1.6 MG/DL (ref 0.8–1.3)
GFR AFRICAN AMERICAN: 50
GFR NON-AFRICAN AMERICAN: 41
GLUCOSE BLD-MCNC: 109 MG/DL (ref 70–99)
GLUCOSE BLD-MCNC: 126 MG/DL (ref 70–99)
GLUCOSE BLD-MCNC: 134 MG/DL (ref 70–99)
GLUCOSE BLD-MCNC: 148 MG/DL (ref 70–99)
GLUCOSE BLD-MCNC: 155 MG/DL (ref 70–99)
GLUCOSE BLD-MCNC: 180 MG/DL (ref 70–99)
PERFORMED ON: ABNORMAL
POTASSIUM SERPL-SCNC: 4.2 MMOL/L (ref 3.5–5.1)
SODIUM BLD-SCNC: 136 MMOL/L (ref 136–145)
VANCOMYCIN TROUGH: 21 UG/ML (ref 10–20)

## 2020-08-14 PROCEDURE — 36415 COLL VENOUS BLD VENIPUNCTURE: CPT

## 2020-08-14 PROCEDURE — 80048 BASIC METABOLIC PNL TOTAL CA: CPT

## 2020-08-14 PROCEDURE — 2580000003 HC RX 258: Performed by: INTERNAL MEDICINE

## 2020-08-14 PROCEDURE — 6370000000 HC RX 637 (ALT 250 FOR IP): Performed by: PHYSICIAN ASSISTANT

## 2020-08-14 PROCEDURE — 70450 CT HEAD/BRAIN W/O DYE: CPT

## 2020-08-14 PROCEDURE — 94761 N-INVAS EAR/PLS OXIMETRY MLT: CPT

## 2020-08-14 PROCEDURE — 6360000002 HC RX W HCPCS: Performed by: INTERNAL MEDICINE

## 2020-08-14 PROCEDURE — 99232 SBSQ HOSP IP/OBS MODERATE 35: CPT | Performed by: INTERNAL MEDICINE

## 2020-08-14 PROCEDURE — 1200000000 HC SEMI PRIVATE

## 2020-08-14 PROCEDURE — 80202 ASSAY OF VANCOMYCIN: CPT

## 2020-08-14 RX ADMIN — Medication 10 ML: at 08:31

## 2020-08-14 RX ADMIN — CEFEPIME 2 G: 2 INJECTION, POWDER, FOR SOLUTION INTRAMUSCULAR; INTRAVENOUS at 22:00

## 2020-08-14 RX ADMIN — Medication 10 ML: at 23:00

## 2020-08-14 RX ADMIN — ENOXAPARIN SODIUM 30 MG: 30 INJECTION SUBCUTANEOUS at 08:31

## 2020-08-14 RX ADMIN — CEFEPIME 2 G: 2 INJECTION, POWDER, FOR SOLUTION INTRAMUSCULAR; INTRAVENOUS at 08:28

## 2020-08-14 RX ADMIN — INSULIN LISPRO 1 UNITS: 100 INJECTION, SOLUTION INTRAVENOUS; SUBCUTANEOUS at 08:28

## 2020-08-14 ASSESSMENT — PAIN SCALES - GENERAL
PAINLEVEL_OUTOF10: 0

## 2020-08-14 NOTE — PROGRESS NOTES
4 Eyes Skin Assessment     The patient is being assess for   Low lucien score    I agree that 2 RN's have performed a thorough Head to Toe Skin Assessment on the patient. ALL assessment sites listed below have been assessed. Areas assessed by both nurses:   [x]   Head, Face, and Ears   [x]   Shoulders, Back, and Chest, Abdomen  [x]   Arms, Elbows, and Hands   [x]   Coccyx, Sacrum, and Ischium  [x]   Legs, Feet, and Heels        Patient has scattered scabs and bruising. Patient is having dressing changes for wound to R foot. Dressing changed today. Coccyx is pink and blanchable. No issues noted to the groin. Heels have mepilex in place are pink and blanchable. **SHARE this note so that the co-signing nurse is able to place an eSignature**    Co-signer eSignature: {Esignature:[id number]}    Does the Patient have Skin Breakdown?   Yes LDA WOUND CARE was Initiated documentation include the Josee-wound, Wound Assessment, Measurements, Dressing Treatment, Drainage, and Color\",          Lucien Prevention initiated:  NA   Wound Care Orders initiated:  Yes      [facility] nurse consulted for Pressure Injury (Stage 3,4, Unstageable, DTI, NWPT, Complex wounds)and New or Established Ostomies:  Yes      Primary Nurse eSignature: Electronically signed by Lisa Bonds RN on 8/14/20 at 3:05 PM EDT

## 2020-08-14 NOTE — PROGRESS NOTES
Patient is not willing to eat tried to feed again and offer water pt would spit it out again and then toss head side to side. Pt keeps tossing around in bed. Padded side rails to keep patient from banging up his arms and legs on the railings.

## 2020-08-14 NOTE — PROGRESS NOTES
Tried to feed patient again. Took small bite and immediately spit it out and stated to gag. Would not try to eat or drink anything. Could not talk completely ignored anything I asked. Patient just seems to be completely out of it. Should be going for head CT shortly received call from radiology that [address] [facility] are putting in for transport now.

## 2020-08-14 NOTE — PROGRESS NOTES
Progress Note    Admit Date:  8/9/2020    Admitted for PNA and COVID-19 rule out . Chest x-ray with multifocal airspace disease. COVID-19 negative     Subjective:  Mr. Prasad Conde  Is stable and afebrile. He feels much better today. He is on room air. 8/13- he is more withdrawn and not wanting to eat and refusing his medication. 8/14- not eating or drinking and still confused. [facility] psychiatry saw patient and feel he has hypoactive delirium. Objective:   Vitals:    08/14/20 0800   BP: (!) 155/90   Pulse: 87   Resp: 26   Temp: 95 °F (35 °C)   SpO2: 92%       Intake/Output Summary (Last 24 hours) at 8/14/2020 3990  Last data filed at 8/13/2020 1750  Gross per 24 hour   Intake 0 ml   Output --   Net 0 ml       Physical Exam:  CNS: eyes open but does not respond to verbal command  PSYCH:  The patient is cooperative, answering questions appropriately. EYES:  Pupils are bilaterally equal.  ENT:  No oral mucosa lesions. RESPIRATORY SYSTEM:   Clear to auscultation , no wheezes rales or rhonchi . CVS:   Regular rate and rhythm. ABDOMEN:  Soft. No evidence of distension. MUSCULOSKELETAL:  No acute obvious musculoskeletal deformities. SKIN:  The patient has got a midline biceps scar over the chest skin that is old and healed well.   NEURO : Nonfocal       Scheduled Meds:   sodium chloride flush  10 mL Intravenous 2 times per day    enoxaparin  30 mg Subcutaneous Daily    cefepime  2 g Intravenous Q12H    amiodarone  100 mg Oral Daily    aspirin  81 mg Oral Daily    atorvastatin  40 mg Oral Daily    DULoxetine  60 mg Oral Daily    finasteride  5 mg Oral Daily    FLUoxetine  10 mg Oral Nightly    budesonide-formoterol  2 puff Inhalation BID    furosemide  20 mg Oral Daily    gabapentin  300 mg Oral BID    insulin glargine  20 Units Subcutaneous Nightly    metoprolol succinate  12.5 mg Oral Daily    pantoprazole  40 mg Oral QAM AC    insulin lispro  0-6 Units Subcutaneous TID WC    insulin Creatinine on admission 1.8  - stable     Elevated troponin   - paced rhythm on EKG  - No acute CP complaints per chart  - may be related to CKD  - Continue tele monitor  - Trend troponin   - PRN EKG     DM2 with neuropathy   - BG is elevated   - Reorder home Lantus and SSI   - Carb control diet  - Continue Neurontin   - Monitor     HTN  - BP is controlled  - Continue home medications     HLD  - Continue statin     Depression   - Continue home medications     Atrial fibrillation   - Not on AC   - Continue home rate controllers  - Rate controlled, paced rhythm   - Continue to monitor     PAD  - follows with Vascular surgery   - s/p bilateral leg angioplasties after chronic non-healing wounds  - Continue home     AAA  - 4.3 cm on last duplex   - Follows with vascular  - Was due for surveillance imaging in Feb 2020     Chronic diabetic foot ulcer  - has followed with Dr. Tricia Strickland and wound care   - s/p left hallux amputation  - Continue wound care for now   - Consult podiatry if wound integrity worsens     Hypoactive delirium. Mental status is still poor. Check head CT today. Monitor for now.       DVT Prophylaxis: Lovenox   Diet: DIET CARDIAC; Carb Control: 4 carb choices (60 gms)/meal  Code Status: DNR-CCA        Karina Stoll 9:29 AM 8/14/2020

## 2020-08-14 NOTE — CONSULTS
[facility] Wound Ostomy Continence Nurse  Consult Note       NAME:  Ryanne Crews  MEDICAL RECORD NUMBER:  [id number]  AGE: 80 y.o. GENDER: male  : 1934  TODAY'S DATE:  2020    Subjective Pt is confused, nonverbal and restless at this time. Mental status changes began yesterday per staff rn.     Reason for WOCN Evaluation and Assessment: right lateral malleolus, right great toe      Ryanne Crews is a 80 y.o. male referred by:   [] Physician  [] Nursing  [x] Other: pt requesting wound care    Wound Identification:  Wound Type: arterial, diabetic and pressure  Contributing Factors: chronic pressure, shear force and arterial insufficiency    Wound History: pt has wound to right great toe and right lateral malleolus that has been present  Since 2019.   Pt seen by wound care center in the past.   Current Wound Care Treatment:  foams    Patient Goal of Care:  [x] Wound Healing  [] Odor Control  [] Palliative Care  [] Pain Control   [] Other:         PAST MEDICAL HISTORY        Diagnosis Date    AAA (abdominal aortic aneurysm) (HCC)     Acute constipation     Acute gastrointestinal hemorrhage     Anemia     Atrial fibrillation (HCC)     Bladder CA in situ     BPH (benign prostatic hyperplasia)     Cancer (HCC)     bladder    CHF (congestive heart failure) (HCC)     Chronic pain     CKD (chronic kidney disease)     Constipation     COPD (chronic obstructive pulmonary disease) (HCC)     Coronary arteriosclerosis     Current mild episode of major depressive disorder (HCC)     Depression     Depression     Diabetes mellitus (HCC)     Diabetic neuropathy (HCC)     Diverticulitis of intestine w/o perforation or abscess with bleeding     GERD (gastroesophageal reflux disease)     GI hemorrhage     Hypercholesteremia     Hypertension     Hypoaldosteronism (HCC)     Insomnia     Neuropathy due to type 2 diabetes mellitus ([facility] [address]     Pacemaker     Postsurgical aortocoronary bypass status     Pure hypercholesterolemia     PVD [facility]peripheral vascular disease) [facility]     S/P cataract surgery     Ulcer of left foot ([facility]     Vitamin D deficiency        PAST SURGICAL HISTORY    Past Surgical History:   Procedure Laterality Date    CARDIAC SURGERY      aortocoronary bypass    COLONOSCOPY  07/29/2019    Incomplete colonoscopy; Poor prep    COLONOSCOPY N/A 7/29/2019    COLON W/ANES. (8:00) (DAUGHTER,JANIA, IS POA, WILL BE HERE FOR PROCEDURE) performed by Kareen Saldivar DO at [address]  08/22/2019    colon;    COLONOSCOPY N/A 8/22/2019    COLONOSCOPY POLYPECTOMY SNARE/COLD BIOPSY performed by Kareen Saldivar DO at [address]      TOE AMPUTATION Left 10/10/2019    PARTIAL FOOT AMPUTATION (Left great toe)    TOE AMPUTATION Left 10/10/2019    PARTIAL FOOT AMPUTATION performed by Melida Tran DPM at [facility] HISTORY    Family History   Problem Relation Age of Onset    Heart Attack Mother     Tuberculosis Mother     Heart Attack Father     Cancer Sister     Cancer Brother     Cancer Sister     Cancer Sister        SOCIAL HISTORY    Social History     Tobacco Use    Smoking status: Former Smoker    Smokeless tobacco: Never Used    Tobacco comment: prior to 2010   Substance Use Topics    Alcohol use: No    Drug use: No       ALLERGIES    Allergies   Allergen Reactions    No Known Allergies        MEDICATIONS    No current facility-administered medications on file prior to encounter. Current Outpatient Medications on File Prior to Encounter   Medication Sig Dispense Refill    FLUoxetine (PROZAC) 10 MG capsule Take 10 mg by mouth nightly      amiodarone (PACERONE) 100 MG tablet Take 100 mg by mouth daily      triamcinolone (KENALOG) 0.1 % cream Apply 1 each topically daily Rt ankle      gabapentin (NEURONTIN) 100 MG capsule Take 300 mg by mouth 2 times daily.  Nutritional Supplements (GLUCERNA ADVANCE SHAKE PO) Take by mouth 2 times daily      insulin glargine (LANTUS) 100 UNIT/ML injection vial Inject 20 Units into the skin nightly       fluticasone-salmeterol (ADVAIR DISKUS) 100-50 MCG/DOSE diskus inhaler Inhale 1 puff into the lungs 2 times daily      ALOGLIPTIN BENZOATE PO Take 12.5 mg by mouth daily       furosemide (LASIX) 20 MG tablet Take 20 mg by mouth daily       acetaminophen (TYLENOL) 325 MG tablet Take 650 mg by mouth every 4 hours as needed for Pain      Protein POWD Take by mouth 1 scoop in 4oz of fluids bid for wound healing      glipiZIDE (GLUCOTROL) 5 MG tablet Take 5 mg by mouth 2 times daily (before meals)       vitamin D (CHOLECALCIFEROL) 5000 units CAPS capsule Take 10,000 Units by mouth once a week Weekly on Mondays.       DULoxetine (CYMBALTA) 60 MG extended release capsule Take 60 mg by mouth daily       polyethylene glycol (GLYCOLAX) packet Take 17 g by mouth See Admin Instructions Every Monday and Friday       insulin lispro (HUMALOG) 100 UNIT/ML injection vial Inject into the skin 4 times daily (before meals and nightly) Sliding scale 200-250=4 units  251-300=6 units  301-350=8 units  351-400=10 units  >400 call MD/CNP      metoprolol succinate (TOPROL XL) 25 MG extended release tablet Take 12.5 mg by mouth daily Hold for SBP <100      atorvastatin (LIPITOR) 40 MG tablet atorvastatin 40 mg tablet      aspirin 81 MG chewable tablet Take 81 mg by mouth daily       omeprazole (PRILOSEC) 40 MG delayed release capsule Take 40 mg by mouth daily       finasteride (PROSCAR) 5 MG tablet Take 5 mg by mouth daily          Objective    BP (!) 155/90   Pulse 87   Temp 95 °F (35 °C) (Axillary)   Resp 26   Ht 5' 8\" (1.727 m)   Wt 177 lb 4.8 oz (80.4 kg)   SpO2 92%   BMI 26.96 kg/m²     LABS:  WBC:    Lab Results   Component Value Date    WBC 14.0 08/11/2020     H/H:    Lab Results   Component Value Date    HGB 12.6 08/11/2020 HCT 39.6 08/11/2020     PTT:  No results found for: APTT, PTT[APTT}  PT/INR:    Lab Results   Component Value Date    PROTIME 15.1 04/16/2019    INR 1.32 04/16/2019     HgBA1c:    Lab Results   Component Value Date    LABA1C 10.7 04/10/2019       Assessment   Lucien Risk Score: Lucien Scale Score: 16    Patient Active Problem List   Diagnosis Code    HCAP (healthcare-associated pneumonia) J18.9    Acute hypoxemic respiratory failure ([facility] J96.01    MARCOS (acute kidney injury) ([facility] N17.9    COPD exacerbation ([facility]) J44.1    Hypertension I10    Hypercholesteremia E78.00    GERD (gastroesophageal reflux disease) K21.9    Diabetes mellitus ([facility] E11.9    Pneumonia due to organism J18.9    Leukocytosis D72.829    Pleural effusion J90    Multifocal pneumonia J18.9    Stage 3 chronic kidney disease (HCC) N18.3    Hypoxia R09.02     0.4x0. 4x0.5cm, Chronic stage 4 pressure injury, bone palpated with qtip. Edges macerated and round. Periwound skin pink. No odor, no purulence    0.6x0.7x0.1cm, chronic right great toe ulcer, 20% pink, 80% brown moist nonviable tissue. No evidence of infection at this time      Measurements:  Wound 08/10/20 Ankle Outer;Right (Active)   Wound Venous 08/10/20 0145   Dressing Status Clean;Dry; Intact 08/14/20 0825   Dressing Changed Changed/New 08/13/20 0601   Dressing/Treatment Foam 08/14/20 0825   Wound Cleansed Rinsed/Irrigated with saline 08/13/20 0601   Wound Length (cm) 0.5 cm 08/10/20 0145   Wound Width (cm) 0.5 cm 08/10/20 0145   Wound Depth (cm) 0.5 cm 08/10/20 0145   Wound Surface Area (cm^2) 0.25 cm^2 08/10/20 0145   Wound Volume (cm^3) 0.12 cm^3 08/10/20 0145   Wound Assessment Red 08/13/20 1000   Drainage Amount Small 08/13/20 1000   Drainage Description Serosanguinous 08/13/20 1000   Odor None 08/12/20 2131   Margins Defined edges 08/12/20 2131   Josee-wound Assessment Blanchable erythema 08/13/20 1000   Red%Wound Bed 100 08/10/20 2224   Number of days: 4       Wound 08/12/20 Toe (Comment  which one) Right diabetic foot ulcer, open (Active)   Dressing Status Clean;Dry; Intact 08/14/20 0825   Dressing Changed Changed/New 08/13/20 0601   Dressing/Treatment Foam 08/14/20 0825   Wound Cleansed Rinsed/Irrigated with saline 08/13/20 0601   Wound Assessment Yellow; Red 08/13/20 1000   Drainage Amount Scant 08/13/20 1000   Drainage Description Serosanguinous 08/13/20 1000   Josee-wound Assessment Blanchable erythema;Pink 08/13/20 1000   Number of days: 1            Response to treatment:  Poorly tolerated by patient. Pt restless and kicking legs        Plan  Right great toe and right lateral malleolus:  Cleanse wOund with NSS, pat dry. Fill wounds with Silver AG at bedside and then cover with foam dressing.  Change every 3 days and prn   Plan of Care: Wound 08/12/20 Toe (Comment  which one) Right diabetic foot ulcer, open-Dressing/Treatment: Foam  Wound 08/10/20 Ankle Outer;Right-Dressing/Treatment: Foam    Specialty Bed Required : No   [] Low Air Loss   [] Pressure Redistribution  [] Fluid Immersion  [] Bariatric  [] Total Pressure Relief  [] Other:  Offload wounds on right great toe and right lateral malleolus    Current Diet: DIET CARDIAC; Carb Control: 4 carb choices (60 gms)/meal  Dietician consult:  No    Discharge Plan:  Placement for patient upon discharge: skilled nursing    Patient appropriate for Outpatient [address]: Yes    Referrals:  []   [] [facility]  [] Supplies  [] Other    Patient/Caregiver Teaching:  Level of patient/caregiver understanding able to:   [] Indicates understanding       [] Needs reinforcement  [] Unsuccessful      [] Verbal Understanding  [] Demonstrated understanding       [x] No evidence of learning -pt very confused and nonverbal at this time  [] Refused teaching         [] N/A    Time spent 30 minutes       Electronically signed by Jude Ace RN, CWOCN on 8/14/2020 at 10:00 AM

## 2020-08-14 NOTE — CARE COORDINATION
INTERDISCIPLINARY PLAN OF CARE CONFERENCE    Date/Time: 8/14/2020 10:28 AM  Completed by: Candy Pryor, Case Management      Patient Name:  Didi Santizo  YOB: 1934  Admitting Diagnosis: Multifocal pneumonia [J18.9]     Admit Date/Time:  8/9/2020  8:45 PM    Chart reviewed. Interdisciplinary team met to discuss patient progress and discharge plans. Disciplines included Case Management, Nursing, and Dietitian. Current Status:Hypoactive delirium. Mental status is still poor. Check head CT today    PT/OT recommendation:NA    Anticipated Discharge Date: tbd  Expected D/C Disposition:  Nursing Home  Confirmed plan with patient and/or family No-pt confused  Discharge Plan Comments: Chart reviewed, met with pt at bedside, pt confused-to have head CT today. [facility] Per Pushpa Caldwell @ [facility] +bedhold    COVID from 8/9 Not detected    The Plan for Transition of Care is related to the following treatment goals: LTC @ [facility]    The Patient and/or patient representative  was provided with a choice of provider and agrees   with the discharge plan. [] Yes [x] No    Freedom of choice list was provided with basic dialogue that supports the patient's individualized plan of care/goals, treatment preferences and shares the quality data associated with the providers.  [] Yes [x] No    Home O2 in place on admit: [facility]  Pt informed of need to bring portable home O2 tank on day of discharge for nursing to connect prior to leaving:  Not Indicated  Verbalized agreement/Understanding:  Not Indicated

## 2020-08-14 NOTE — FLOWSHEET NOTE
08/14/20 0800   Vital Signs   Temp 95 °F (35 °C)   Temp Source Axillary   Pulse 87   Resp 26   BP (!) 155/90   BP Location Right upper arm   BP Upper/Lower Upper   Patient Position Supine   Level of Consciousness 0   MEWS Score 2   Patient Currently in Pain No   Pain Assessment   Pain Level 0   Patient's Stated Pain Goal No pain   Oxygen Therapy   SpO2 92 %   O2 Device None (Room air)     Pt is still extremely confused will not answer any questions does not appear to understand anything that is being said to him. Patient keeps shifting and turning constantly from side to side and is non compliant. Had to take BP 5x and hold arm to try and get a reading. Pt does not seem to understand anything that is said to him, blank stare to face. Patient will not take any of his by mouth pills. Attempted to feed his morning breakfast and give pills with applesauce. Anything that was given patient would push away or spit out immediately. Would not  Eat or drink any thing and non of his morning pills were able to be given. Tried multiple times to get patient to eat or take medications. telesitter remains in room and bed alarm is on.

## 2020-08-14 NOTE — PROGRESS NOTES
Pulmonary Progress Note  CC: Pneumonia, shortness of breath    Subjective:  Noncommunicative, now not eating    IV line peripheral      EXAM:   BP (!) 155/90   Pulse 87   Temp 95 °F (35 °C) (Axillary)   Resp 26   Ht 5' 8\" (1.727 m)   Wt 177 lb 4.8 oz (80.4 kg)   SpO2 92%   BMI 26.96 kg/m²  on room air  Constitutional:  No acute distress   HEENT: no scleral icterus  Neck: No tracheal deviation present. Cardiovascular: Normal heart sounds. Pulmonary/Chest: No wheezes. No rhonchi. No rales. No decreased breath sounds. No accessory muscle usage or stridor. Abdominal: Soft. Musculoskeletal: No cyanosis. No clubbing. Skin: Skin is warm and dry. Neuro: Noncommunicative, he is awake    Scheduled Meds:   sodium chloride flush  10 mL Intravenous 2 times per day    enoxaparin  30 mg Subcutaneous Daily    cefepime  2 g Intravenous Q12H    amiodarone  100 mg Oral Daily    aspirin  81 mg Oral Daily    atorvastatin  40 mg Oral Daily    DULoxetine  60 mg Oral Daily    finasteride  5 mg Oral Daily    FLUoxetine  10 mg Oral Nightly    budesonide-formoterol  2 puff Inhalation BID    furosemide  20 mg Oral Daily    gabapentin  300 mg Oral BID    insulin glargine  20 Units Subcutaneous Nightly    metoprolol succinate  12.5 mg Oral Daily    pantoprazole  40 mg Oral QAM AC    insulin lispro  0-6 Units Subcutaneous TID WC    insulin lispro  0-3 Units Subcutaneous Nightly    vancomycin (VANCOCIN) intermittent dosing (placeholder)   Other RX Placeholder     Continuous Infusions:   dextrose       PRN Meds:  sodium chloride flush, promethazine **OR** ondansetron, albuterol sulfate HFA, ipratropium, glucose, dextrose, glucagon (rDNA), dextrose    Labs:  CBC:   No results for input(s): WBC, HGB, HCT, MCV, PLT in the last 72 hours.   BMP:   Recent Labs     08/13/20  0525 08/14/20  0621    136   K 4.3 4.2   CL 99 97*   CO2 27 22   BUN 42* 46*   CREATININE 1.7* 1.6*       Cultures:  None    Films:  CXR 8/9: diffuse L sided airspace consolidation  HCT 8/14/20  Impression    No acute intracranial abnormality. ASSESSMENT:  · Left-sided HCAP  · History of COPD  · CKD stage III  · Leukocytosis  · Dementia: Baseline is nonverbal and combative. Has history of hypoactive delirium    PLAN:  · Cefepime, vancomycin D#6.   Received 5 days of azithromycin

## 2020-08-14 NOTE — PLAN OF CARE
Problem: Gas Exchange - Impaired:  Goal: Levels of oxygenation will improve  Description: Levels of oxygenation will improve  Outcome: Met This Shift   Patient oxygenation levels are above 90% but patient breaths irregularly  Problem: Falls - Risk of:  Goal: Will remain free from falls  Description: Will remain free from falls  Outcome: Ongoing     Problem: Airway Clearance - Ineffective:  Goal: Clear lung sounds  Description: Clear lung sounds  Outcome: Ongoing   Patient has an occasional cough  Problem: Skin Integrity:  Goal: Skin integrity will stabilize  Description: Skin integrity will stabilize  Outcome: Ongoing   Patient turns often no issues noted to coccyx lots of scattered bruises.

## 2020-08-15 LAB
ANION GAP SERPL CALCULATED.3IONS-SCNC: 22 MMOL/L (ref 3–16)
BUN BLDV-MCNC: 55 MG/DL (ref 7–20)
CALCIUM SERPL-MCNC: 9.7 MG/DL (ref 8.3–10.6)
CHLORIDE BLD-SCNC: 97 MMOL/L (ref 99–110)
CO2: 17 MMOL/L (ref 21–32)
CREAT SERPL-MCNC: 2 MG/DL (ref 0.8–1.3)
GFR AFRICAN AMERICAN: 39
GFR NON-AFRICAN AMERICAN: 32
GLUCOSE BLD-MCNC: 164 MG/DL (ref 70–99)
GLUCOSE BLD-MCNC: 171 MG/DL (ref 70–99)
GLUCOSE BLD-MCNC: 186 MG/DL (ref 70–99)
GLUCOSE BLD-MCNC: 188 MG/DL (ref 70–99)
GLUCOSE BLD-MCNC: 190 MG/DL (ref 70–99)
PERFORMED ON: ABNORMAL
POTASSIUM SERPL-SCNC: 5.1 MMOL/L (ref 3.5–5.1)
SODIUM BLD-SCNC: 136 MMOL/L (ref 136–145)
VANCOMYCIN RANDOM: 17.1 UG/ML

## 2020-08-15 PROCEDURE — 99232 SBSQ HOSP IP/OBS MODERATE 35: CPT | Performed by: INTERNAL MEDICINE

## 2020-08-15 PROCEDURE — 6360000002 HC RX W HCPCS: Performed by: INTERNAL MEDICINE

## 2020-08-15 PROCEDURE — 6360000002 HC RX W HCPCS: Performed by: PSYCHIATRY & NEUROLOGY

## 2020-08-15 PROCEDURE — 80048 BASIC METABOLIC PNL TOTAL CA: CPT

## 2020-08-15 PROCEDURE — 2580000003 HC RX 258: Performed by: INTERNAL MEDICINE

## 2020-08-15 PROCEDURE — 80202 ASSAY OF VANCOMYCIN: CPT

## 2020-08-15 PROCEDURE — 99232 SBSQ HOSP IP/OBS MODERATE 35: CPT | Performed by: PSYCHIATRY & NEUROLOGY

## 2020-08-15 PROCEDURE — 1200000000 HC SEMI PRIVATE

## 2020-08-15 PROCEDURE — 36415 COLL VENOUS BLD VENIPUNCTURE: CPT

## 2020-08-15 RX ORDER — SODIUM CHLORIDE 9 MG/ML
INJECTION, SOLUTION INTRAVENOUS CONTINUOUS
Status: DISCONTINUED | OUTPATIENT
Start: 2020-08-15 | End: 2020-08-17 | Stop reason: HOSPADM

## 2020-08-15 RX ORDER — HALOPERIDOL 1 MG/1
0.25 TABLET ORAL EVERY 6 HOURS PRN
Status: DISCONTINUED | OUTPATIENT
Start: 2020-08-15 | End: 2020-08-16

## 2020-08-15 RX ORDER — HALOPERIDOL 5 MG/ML
0.5 INJECTION INTRAMUSCULAR EVERY 6 HOURS PRN
Status: DISCONTINUED | OUTPATIENT
Start: 2020-08-15 | End: 2020-08-16

## 2020-08-15 RX ORDER — HALOPERIDOL 5 MG/ML
10 INJECTION INTRAMUSCULAR ONCE
Status: COMPLETED | OUTPATIENT
Start: 2020-08-15 | End: 2020-08-15

## 2020-08-15 RX ADMIN — CEFEPIME 2 G: 2 INJECTION, POWDER, FOR SOLUTION INTRAMUSCULAR; INTRAVENOUS at 13:13

## 2020-08-15 RX ADMIN — CEFEPIME 2 G: 2 INJECTION, POWDER, FOR SOLUTION INTRAMUSCULAR; INTRAVENOUS at 21:15

## 2020-08-15 RX ADMIN — SODIUM CHLORIDE: 9 INJECTION, SOLUTION INTRAVENOUS at 18:02

## 2020-08-15 RX ADMIN — HALOPERIDOL LACTATE 10 MG: 5 INJECTION, SOLUTION INTRAMUSCULAR at 23:00

## 2020-08-15 RX ADMIN — HALOPERIDOL LACTATE 0.5 MG: 5 INJECTION, SOLUTION INTRAMUSCULAR at 21:14

## 2020-08-15 NOTE — PROGRESS NOTES
08/13/20  0525 08/14/20  0621 08/15/20  0630    136 136   K 4.3 4.2 5.1   CL 99 97* 97*   CO2 27 22 17*   BUN 42* 46* 55*   CREATININE 1.7* 1.6* 2.0*       Cultures:  None    Films:  CXR 8/9: diffuse L sided airspace consolidation  HCT 8/14/20  Impression    No acute intracranial abnormality. ASSESSMENT:  · Left-sided HCAP  · History of COPD  · CKD stage III  · Leukocytosis  · Dementia: Baseline is nonverbal and combative. Has history of hypoactive delirium    PLAN:  · Cefepime, vancomycin D#7/7.   Received 5 days of azithromycin

## 2020-08-15 NOTE — PROGRESS NOTES
Vancomycin Day 6  Current Dosing: Pulse dosing  Vancomycin random level = 17.1 mcg/mL at 0630  SCr = 2  Estimated Creatinine Clearance: 26 mL/min (A) (based on SCr of 2 mg/dL (H)).   Give Vancomycin 1000mg IVPB @1600  Vancomycin random level ordered for tomorrow @1500  Emily Hanson 8/15/2020 2:50 PM

## 2020-08-15 NOTE — PROGRESS NOTES
Progress Note    Admit Date:  8/9/2020    Admitted for PNA and COVID-19 rule out . Chest x-ray with multifocal airspace disease. COVID-19 negative     Subjective:  Mr. Sathish River  Is stable and afebrile. He feels much better today. He is on room air. 8/13- he is more withdrawn and not wanting to eat and refusing his medication. 8/14- not eating or drinking and still confused. [facility] psychiatry saw patient and feel he has hypoactive delirium. 8/15-awake but he does not respond to verbal commands. He is thrashing in the bed. P.o. intake is poor. Objective:   Vitals:    08/15/20 1030   BP:    Pulse:    Resp:    Temp: 97.8 °F (36.6 °C)   SpO2:        Intake/Output Summary (Last 24 hours) at 8/15/2020 1342  Last data filed at 8/14/2020 2230  Gross per 24 hour   Intake 50 ml   Output --   Net 50 ml       Physical Exam:  CNS: eyes open but does not respond to verbal command  PSYCH:  The patient is cooperative, answering questions appropriately. EYES:  Pupils are bilaterally equal.  ENT:  No oral mucosa lesions. RESPIRATORY SYSTEM:   Clear to auscultation , no wheezes rales or rhonchi . CVS:   Regular rate and rhythm. ABDOMEN:  Soft. No evidence of distension. MUSCULOSKELETAL:  No acute obvious musculoskeletal deformities. SKIN:  The patient has got a midline biceps scar over the chest skin that is old and healed well.   NEURO : Nonfocal       Scheduled Meds:   sodium chloride flush  10 mL Intravenous 2 times per day    enoxaparin  30 mg Subcutaneous Daily    cefepime  2 g Intravenous Q12H    amiodarone  100 mg Oral Daily    aspirin  81 mg Oral Daily    atorvastatin  40 mg Oral Daily    DULoxetine  60 mg Oral Daily    finasteride  5 mg Oral Daily    FLUoxetine  10 mg Oral Nightly    budesonide-formoterol  2 puff Inhalation BID    furosemide  20 mg Oral Daily    gabapentin  300 mg Oral BID    insulin glargine  20 Units Subcutaneous Nightly    metoprolol succinate  12.5 mg Oral Daily    pantoprazole  40 mg Oral QAM AC    insulin lispro  0-6 Units Subcutaneous TID WC    insulin lispro  0-3 Units Subcutaneous Nightly    vancomycin (VANCOCIN) intermittent dosing (placeholder)   Other RX Placeholder       Continuous Infusions:   dextrose         PRN Meds:  sodium chloride flush, promethazine **OR** ondansetron, albuterol sulfate HFA, ipratropium, glucose, dextrose, glucagon (rDNA), dextrose      Data:  CBC:   No results for input(s): WBC, HGB, HCT, MCV, PLT in the last 72 hours. BMP:   Recent Labs     08/13/20  0525 08/14/20  0621 08/15/20  0630    136 136   K 4.3 4.2 5.1   CL 99 97* 97*   CO2 27 22 17*   BUN 42* 46* 55*   CREATININE 1.7* 1.6* 2.0*   Results for Jacqueline You (MRN [id number]) as of 8/15/2020 13:43   Ref. Range 8/15/2020 06:30   Vancomycin Rm Latest Units: ug/mL 17.1     LIVER PROFILE:   No results for input(s): AST, ALT, LIPASE, BILIDIR, BILITOT, ALKPHOS in the last 72 hours. Invalid input(s): AMYLASE,  ALB  Pro-BNP  3,539High      Troponin  0. 04High       D-Dimer, Quant  744High      Procalcitonin  0.14      CULTURES  COVID-19: pending      RADIOLOGY  CT HEAD WO CONTRAST   Final Result   No acute intracranial abnormality. XR CHEST PORTABLE   Final Result   Multifocal left midlung consolidation, most consistent with pneumonia,   consider a bacterial origin. Small bilateral pleural effusions. Radiographic follow-up to resolution is recommended. Assessment/Plan:  Acute hypoxic respiratory failure resolved. - Does not use supplemental O2 at home   - Was requiring up to 4L NC--> now on room air --> wean as tolerated to room air  - Suspect 2/2 multifocal PNA, see below  - COVID 19 negative. - pulmonology consulted    Multifocal PNA  - CXR shows bilateral effusions with multifocal PNA  - Concerns for gram neg organism with risk for MRSA (patient is from SNF), continue Cefepime/Azithromycin. Vancomycin added. Vancomycin level is okay. Monitor closely given renal dysfunction.  - PCT is WNL  - Pulmonology consult  Discussed with pulmonologist.   continue current IV antibiotics. COPD  - not reported as AE      Leukocytosis   - suspect 2/2 HCAP   - Trend      Hyperkalemia  - K+ on admission 5.5. Repeat potassium levels are normal  - EKG without acute change    CKD Stage III   - Baseline creatinine ~1.6   - Creatinine on admission 1.8  -Creatinine is up a little to 2. Resume IV fluids. Elevated troponin   - paced rhythm on EKG  - No acute CP complaints per chart  - may be related to CKD  - Continue tele monitor  - Trend troponin   - PRN EKG     DM2 with neuropathy   - BG is elevated   - Reorder home Lantus and SSI   - Carb control diet  - Continue Neurontin   - Monitor     HTN  - BP is controlled  - Continue home medications     HLD  - Continue statin     Depression   - Continue home medications     Atrial fibrillation   - Not on AC   - Continue home rate controllers  - Rate controlled, paced rhythm   - Continue to monitor     PAD  - follows with Vascular surgery   - s/p bilateral leg angioplasties after chronic non-healing wounds  - Continue home     AAA  - 4.3 cm on last duplex   - Follows with vascular  - Was due for surveillance imaging in Feb 2020     Chronic diabetic foot ulcer  - has followed with Dr. Jason Moore and wound care   - s/p left hallux amputation  - Continue wound care for now   - Consult podiatry if wound integrity worsens     Hypoactive delirium. Mental status is still poor. Checked head CT. It was unremarkable. I personally talked to his POA on 8/14/2020 and given an update. Prognosis guarded.       DVT Prophylaxis: Lovenox   Diet: DIET CARDIAC; Carb Control: 4 carb choices (60 gms)/meal  Code Status: DNR-CCA        Maranda Zuñiga 1:42 PM 8/15/2020

## 2020-08-15 NOTE — PROGRESS NOTES
Pt thrashing, combative, throwing arms and legs in air. BP unable to be taken, spo2 also unable to stay in place to take reading as patient is turning in bed back and forth and moving arms and legs when touched. RN was able to shortly listen to lungs, sound diminished but clear, s1 s2. MD notified of unable to get VS. No new orders at this time. Will continue to monitor. Telesitter in place. Bed alarm set.

## 2020-08-15 NOTE — PROGRESS NOTES
Family here for updated.  Pt still combative when being touched, rolling side to side in bed raising arms legs, Unable to obtain vitals except temp

## 2020-08-15 NOTE — PROGRESS NOTES
Department of Psychiatry  Attending Progress Note    Admission Date:    8/9/2020    Chief complaint / Reason for Admission:  Altered mental status    Patient's chart was reviewed, case was discussed with nursing staff. SUBJECTIVE:   Over last 24 hours:  Behavioral outbursts: No   Non-aggressive behavioral disturbance: Yes  Medication compliant: No  Need for seclusion/restraints: No  Sleeping adequately:  No, excessive  Appetite adequate: No    Patient's condition has not improved since initial assessment on the 13th. He continues not to eat or drink, and to spit out medication spooned to him by staff. He remains non-verbal and typically will not open his eyes. His RN this morning reports that he is more restless and physically resistant to hands on care today, as compared to having been listless earlier. On my assessment pt was lying in bed on his side with his eyes closed. Exhibited PMA, repeatedly rocking back and forth on his side. He would not or could not open his eyes, and did not respond to any of my questions. He could not follow one step commands. Head CT was performed yesterday to eval for potential acute CVA. Exam was limited by motion artifact, but did not reveal acute change.     ROS: Patient has new complaints: no    Current Medications Ordered:   vancomycin  1,000 mg Intravenous Once    sodium chloride flush  10 mL Intravenous 2 times per day    enoxaparin  30 mg Subcutaneous Daily    cefepime  2 g Intravenous Q12H    amiodarone  100 mg Oral Daily    aspirin  81 mg Oral Daily    atorvastatin  40 mg Oral Daily    DULoxetine  60 mg Oral Daily    finasteride  5 mg Oral Daily    FLUoxetine  10 mg Oral Nightly    budesonide-formoterol  2 puff Inhalation BID    furosemide  20 mg Oral Daily    gabapentin  300 mg Oral BID    insulin glargine  20 Units Subcutaneous Nightly    metoprolol succinate  12.5 mg Oral Daily    pantoprazole  40 mg Oral QAM AC    insulin lispro  0-6 Units Subcutaneous TID WC    insulin lispro  0-3 Units Subcutaneous Nightly    vancomycin (VANCOCIN) intermittent dosing (placeholder)   Other RX Placeholder      PRN Meds: sodium chloride flush, promethazine **OR** ondansetron, albuterol sulfate HFA, ipratropium, glucose, dextrose, glucagon (rDNA), dextrose     Objective:     PE:    BP (!) 142/77   Pulse 71   Temp 97.8 °F (36.6 °C) Comment: pt thrashing, combative, unable to get VS. MD made aware  Resp 22   Ht 5' 8\" (1.727 m)   Wt 177 lb 4.8 oz (80.4 kg)   SpO2 96%   BMI 26.96 kg/m²       Motor / Gait: Restless, rocking back and forth in bed, gait deferred    Mental Status Examination:    Appearance: WM, appears stated age, lying in bed, wearing depends only, disheveled grooming and hygiene   Behavior/Attitude toward examiner:  Uncooperative  Speech:  Mute  Mood:  No mood stated  Affect:  Flat   Thought processes:  Unable to assess due to lack of expressed speech  Thought Content: Unable to assess due to lack of expressed speech  Perceptions: Unable to assess due to lack of expressed speech  Attention: Severely impaired  Abstraction: Unable to assess due to lack of expressed speech  Cognition: Unable to assess due to lack of expressed speech  Insight: Unable to assess due to lack of expressed speech, but presumed to be impaired  Judgment: Unable to assess due to lack of expressed speech, but presumed to be impaired    LAB: Reviewed labs from last 24 hours      Dx:   Primary Psychiatric (DSM V) Diagnosis: Delirium, mixed level of activity secondary to pneumonia   Secondary Psychiatric (DSM V) Diagnoses: Major depressive disorder, by history  Chemical Dependency Diagnoses: None  Active Medical Diagnoses: Multifocal PNA, CKD III, COPD, DM2, HTN, HLD, Afib, PAD, AAA, chronic diabetic foot ulcer    Recommendations:    1. Patient's presentation remains consistent with delirium. Was clearly hypoactive in character previously.   Now patient exhibiting some

## 2020-08-15 NOTE — PROGRESS NOTES
Vancomycin Day # 5  Current dose = Pulse dosing per daily vancomycin levels. BUN/SRCR 46/1.6      Est CrCl = 32 ml/min  WBC               Tmax 97.2  Vancomycin random level this am = 21 mcg/ml  No vancomycin today. Will recheck random level tomorrow am.  Pharmacy will continue to obtain daily random vancomycin levels and redose if level less than 18 mcg/ml.

## 2020-08-15 NOTE — PLAN OF CARE
Problem: Falls - Risk of:  Goal: Will remain free from falls  Description: Will remain free from falls  Outcome: Ongoing  Goal: Absence of physical injury  Description: Absence of physical injury  Outcome: Ongoing     Problem: Discharge Planning:  Goal: Discharged to appropriate level of care  Description: Discharged to appropriate level of care  Outcome: Ongoing  Goal: Participates in care planning  Description: Participates in care planning  Outcome: Ongoing     Problem: Airway Clearance - Ineffective:  Goal: Clear lung sounds  Description: Clear lung sounds  Outcome: Ongoing  Goal: Ability to maintain a clear airway will improve  Description: Ability to maintain a clear airway will improve  Outcome: Ongoing     Problem: Fluid Volume - Deficit:  Goal: Achieves intake and output within specified parameters  Description: Achieves intake and output within specified parameters  Outcome: Ongoing     Problem: Daily Care:  Goal: Daily care needs are met  Description: Daily care needs are met  Outcome: Ongoing     Problem: Pain:  Goal: Patient's pain/discomfort is manageable  Description: Patient's pain/discomfort is manageable  Outcome: Ongoing

## 2020-08-16 LAB
ALBUMIN SERPL-MCNC: 3.7 G/DL (ref 3.4–5)
ALP BLD-CCNC: 111 U/L (ref 40–129)
ALT SERPL-CCNC: 51 U/L (ref 10–40)
ANION GAP SERPL CALCULATED.3IONS-SCNC: 21 MMOL/L (ref 3–16)
ANISOCYTOSIS: ABNORMAL
AST SERPL-CCNC: 113 U/L (ref 15–37)
BASOPHILS ABSOLUTE: 0 K/UL (ref 0–0.2)
BASOPHILS RELATIVE PERCENT: 0 %
BILIRUB SERPL-MCNC: 1.4 MG/DL (ref 0–1)
BILIRUBIN DIRECT: 0.4 MG/DL (ref 0–0.3)
BILIRUBIN, INDIRECT: 1 MG/DL (ref 0–1)
BUN BLDV-MCNC: 73 MG/DL (ref 7–20)
CALCIUM SERPL-MCNC: 9.4 MG/DL (ref 8.3–10.6)
CHLORIDE BLD-SCNC: 100 MMOL/L (ref 99–110)
CO2: 17 MMOL/L (ref 21–32)
CREAT SERPL-MCNC: 2.9 MG/DL (ref 0.8–1.3)
EOSINOPHILS ABSOLUTE: 0 K/UL (ref 0–0.6)
EOSINOPHILS RELATIVE PERCENT: 0 %
GFR AFRICAN AMERICAN: 25
GFR NON-AFRICAN AMERICAN: 21
GLUCOSE BLD-MCNC: 158 MG/DL (ref 70–99)
GLUCOSE BLD-MCNC: 185 MG/DL (ref 70–99)
GLUCOSE BLD-MCNC: 187 MG/DL (ref 70–99)
GLUCOSE BLD-MCNC: 192 MG/DL (ref 70–99)
GLUCOSE BLD-MCNC: 214 MG/DL (ref 70–99)
HCT VFR BLD CALC: 46.9 % (ref 40.5–52.5)
HEMOGLOBIN: 14.8 G/DL (ref 13.5–17.5)
LYMPHOCYTES ABSOLUTE: 2 K/UL (ref 1–5.1)
LYMPHOCYTES RELATIVE PERCENT: 8 %
MCH RBC QN AUTO: 28.7 PG (ref 26–34)
MCHC RBC AUTO-ENTMCNC: 31.5 G/DL (ref 31–36)
MCV RBC AUTO: 91 FL (ref 80–100)
MONOCYTES ABSOLUTE: 1.5 K/UL (ref 0–1.3)
MONOCYTES RELATIVE PERCENT: 6 %
NEUTROPHILS ABSOLUTE: 21.5 K/UL (ref 1.7–7.7)
NEUTROPHILS RELATIVE PERCENT: 86 %
PDW BLD-RTO: 15.9 % (ref 12.4–15.4)
PERFORMED ON: ABNORMAL
PLATELET # BLD: 356 K/UL (ref 135–450)
PLATELET SLIDE REVIEW: ADEQUATE
PMV BLD AUTO: 8.2 FL (ref 5–10.5)
POTASSIUM SERPL-SCNC: 4.9 MMOL/L (ref 3.5–5.1)
RBC # BLD: 5.15 M/UL (ref 4.2–5.9)
SLIDE REVIEW: ABNORMAL
SODIUM BLD-SCNC: 138 MMOL/L (ref 136–145)
TOTAL PROTEIN: 7.9 G/DL (ref 6.4–8.2)
WBC # BLD: 25 K/UL (ref 4–11)

## 2020-08-16 PROCEDURE — 99232 SBSQ HOSP IP/OBS MODERATE 35: CPT | Performed by: PSYCHIATRY & NEUROLOGY

## 2020-08-16 PROCEDURE — 6360000002 HC RX W HCPCS: Performed by: INTERNAL MEDICINE

## 2020-08-16 PROCEDURE — 2580000003 HC RX 258: Performed by: INTERNAL MEDICINE

## 2020-08-16 PROCEDURE — 51798 US URINE CAPACITY MEASURE: CPT

## 2020-08-16 PROCEDURE — 6360000002 HC RX W HCPCS: Performed by: PSYCHIATRY & NEUROLOGY

## 2020-08-16 PROCEDURE — 1200000000 HC SEMI PRIVATE

## 2020-08-16 PROCEDURE — 99232 SBSQ HOSP IP/OBS MODERATE 35: CPT | Performed by: INTERNAL MEDICINE

## 2020-08-16 PROCEDURE — 80076 HEPATIC FUNCTION PANEL: CPT

## 2020-08-16 PROCEDURE — 80048 BASIC METABOLIC PNL TOTAL CA: CPT

## 2020-08-16 PROCEDURE — 36415 COLL VENOUS BLD VENIPUNCTURE: CPT

## 2020-08-16 PROCEDURE — 85025 COMPLETE CBC W/AUTO DIFF WBC: CPT

## 2020-08-16 RX ORDER — HALOPERIDOL 5 MG
2.5 TABLET ORAL EVERY 4 HOURS PRN
Status: DISCONTINUED | OUTPATIENT
Start: 2020-08-16 | End: 2020-08-17 | Stop reason: HOSPADM

## 2020-08-16 RX ORDER — HALOPERIDOL 5 MG/ML
2.5 INJECTION INTRAMUSCULAR EVERY 4 HOURS PRN
Status: DISCONTINUED | OUTPATIENT
Start: 2020-08-16 | End: 2020-08-17 | Stop reason: HOSPADM

## 2020-08-16 RX ORDER — LORAZEPAM 2 MG/ML
1 INJECTION INTRAMUSCULAR EVERY 4 HOURS PRN
Status: DISCONTINUED | OUTPATIENT
Start: 2020-08-16 | End: 2020-08-17 | Stop reason: HOSPADM

## 2020-08-16 RX ORDER — DIPHENHYDRAMINE HYDROCHLORIDE 50 MG/ML
25 INJECTION INTRAMUSCULAR; INTRAVENOUS ONCE
Status: COMPLETED | OUTPATIENT
Start: 2020-08-16 | End: 2020-08-16

## 2020-08-16 RX ADMIN — INSULIN GLARGINE 20 UNITS: 100 INJECTION, SOLUTION SUBCUTANEOUS at 20:42

## 2020-08-16 RX ADMIN — DIPHENHYDRAMINE HYDROCHLORIDE 25 MG: 50 INJECTION, SOLUTION INTRAMUSCULAR; INTRAVENOUS at 01:27

## 2020-08-16 RX ADMIN — Medication 10 ML: at 20:49

## 2020-08-16 RX ADMIN — Medication 10 ML: at 09:07

## 2020-08-16 RX ADMIN — ENOXAPARIN SODIUM 30 MG: 30 INJECTION SUBCUTANEOUS at 09:07

## 2020-08-16 RX ADMIN — LORAZEPAM 1 MG: 2 INJECTION INTRAMUSCULAR; INTRAVENOUS at 15:08

## 2020-08-16 NOTE — PROGRESS NOTES
Pt has begun to thrash around in the bed again. Hitting arm and legs on the bed. Unable to reorient the patient.

## 2020-08-16 NOTE — CONSULTS
[facility]    Consult Note           Reason for Consult:  MARCOS on CKD III  Requesting Physician:  Dr. Anita Pemberton    Chief Complaint:    Chief Complaint   Patient presents with    Shortness of Breath     pt brought to ED from [place] for SOB, duoneb and solumedrol given en route       History of Present Illness on 8/15/20:    80 y.o. yo male with PMH of CKD , afib, BPH who is admitted for sob and pneumonia on 8/9/20  Has been treated with iv vanc and cefepime  vanc 1 g last 8/13 and vanc level 17 was on 8/15, trough was 21 on 8/14  Lasix has been on hold for several days  IVF started at 100 m/h from 8/15  Pt is quite confused and cannot give any history  Nephrology consulted for marcos    Past Medical History:        Diagnosis Date    AAA (abdominal aortic aneurysm) ([address]     Acute constipation     Acute gastrointestinal hemorrhage     Anemia     Atrial fibrillation (HCC)     Bladder CA in situ     BPH (benign prostatic hyperplasia)     Cancer ([address]     bladder    CHF (congestive heart failure) ([address]     Chronic pain     CKD (chronic kidney disease)     Constipation     COPD (chronic obstructive pulmonary disease) ([address]     Coronary arteriosclerosis     Current mild episode of major depressive disorder ([address]     Depression     Depression     Diabetes mellitus ([address]     Diabetic neuropathy ([address]     Diverticulitis of intestine w/o perforation or abscess with bleeding     GERD (gastroesophageal reflux disease)     GI hemorrhage     Hypercholesteremia     Hypertension     Hypoaldosteronism ([address]     Insomnia     Neuropathy due to type 2 diabetes mellitus ([address]     Pacemaker     Postsurgical aortocoronary bypass status     Pure hypercholesterolemia     PVD (peripheral vascular disease) ([address]     S/P cataract surgery     Ulcer of left foot ([address]     Vitamin D deficiency        Past Surgical History:        Procedure Laterality Date    CARDIAC SURGERY      aortocoronary bypass    COLONOSCOPY  07/29/2019    Incomplete colonoscopy; Poor prep    COLONOSCOPY N/A 7/29/2019    COLON W/ANES. (8:00) (DAUGHTER,JANIA, IS POA, WILL BE HERE FOR PROCEDURE) performed by Lazaro Murcia DO at [address]  08/22/2019    colon;    COLONOSCOPY N/A 8/22/2019    COLONOSCOPY POLYPECTOMY SNARE/COLD BIOPSY performed by Lazaro Murcia DO at [address]      TOE AMPUTATION Left 10/10/2019    PARTIAL FOOT AMPUTATION (Left great toe)    TOE AMPUTATION Left 10/10/2019    PARTIAL FOOT AMPUTATION performed by Beverley Lott DPM at [address] Medications:    No current facility-administered medications on file prior to encounter. Current Outpatient Medications on File Prior to Encounter   Medication Sig Dispense Refill    FLUoxetine (PROZAC) 10 MG capsule Take 10 mg by mouth nightly      amiodarone (PACERONE) 100 MG tablet Take 100 mg by mouth daily      triamcinolone (KENALOG) 0.1 % cream Apply 1 each topically daily Rt ankle      gabapentin (NEURONTIN) 100 MG capsule Take 300 mg by mouth 2 times daily.        Nutritional Supplements (GLUCERNA ADVANCE SHAKE PO) Take by mouth 2 times daily      insulin glargine (LANTUS) 100 UNIT/ML injection vial Inject 20 Units into the skin nightly       fluticasone-salmeterol (ADVAIR DISKUS) 100-50 MCG/DOSE diskus inhaler Inhale 1 puff into the lungs 2 times daily      ALOGLIPTIN BENZOATE PO Take 12.5 mg by mouth daily       furosemide (LASIX) 20 MG tablet Take 20 mg by mouth daily       acetaminophen (TYLENOL) 325 MG tablet Take 650 mg by mouth every 4 hours as needed for Pain      Protein POWD Take by mouth 1 scoop in 4oz of fluids bid for wound healing      glipiZIDE (GLUCOTROL) 5 MG tablet Take 5 mg by mouth 2 times daily (before meals)       vitamin D (CHOLECALCIFEROL) 5000 units CAPS capsule Take 10,000 Units by mouth once a week on file     Relationship status: Not on file    Intimate partner violence     Fear of current or ex partner: Not on file     Emotionally abused: Not on file     Physically abused: Not on file     Forced sexual activity: Not on file   Other Topics Concern    Not on file   Social History Narrative    Not on file       Family History:   Family History   Problem Relation Age of Onset    Heart Attack Mother     Tuberculosis Mother     Heart Attack Father    Wallene Ripper Sister        Review of Systems:   UTO    Physical exam:   Constitutional:  VITALS:  /64   Pulse 95   Temp 99.1 °F (37.3 °C) (Axillary)   Resp 24   Ht 5' 8\" (1.727 m)   Wt 177 lb 4.8 oz (80.4 kg)   SpO2 93%   BMI 26.96 kg/m²   Gen: Restless and agitated  Skin: no rash, turgor wnl  Heent:  eomi, mmm  Neck: no bruits or jvd noted, thyroid normal  Cardiovascular:  S1, S2 without m/r/g  Respiratory: CTA B without w/r/r; respiratory effort normal  Abdomen:  +bs, soft, nt, nd, no hepatosplenomegaly  Ext: no lower extremity edema  Neuro/Psy: Agitated    Data/  No results for input(s): WBC, HGB, HCT, MCV, PLT in the last 72 hours. Recent Labs     08/14/20  0621 08/15/20  0630 08/16/20  0803    136 138   K 4.2 5.1 4.9   CL 97* 97* 100   CO2 22 17* 17*   GLUCOSE 155* 186* 214*   BUN 46* 55* 73*   CREATININE 1.6* 2.0* 2.9*   LABGLOM 41* 32* 21*   GFRAA 50* 39* 25*       Assessment  -MARCOS  -Leukocytosis  -Acute encephalopathy, cause unknown    Plan  Patient has been made hospice by the family and will sign off    Thank you for the consultation. Please do not hesitate to call with questions.     Josiah Muhammad  [facility]  Office: [phone]  Fax:    [phone]

## 2020-08-16 NOTE — PROGRESS NOTES
Department of Psychiatry  Attending Progress Note    Admission Date:    8/9/2020    Chief complaint / Reason for Admission:  Altered mental status    Patient's chart was reviewed, case was discussed with nursing staff. SUBJECTIVE:   Over last 24 hours:  Last evening patient's psychomotor agitation increased. He remains unresponsive, and can not open his eyes, speak, or follow commands. Described by staff as \"thrashing\" and repeatedly hitting his arms against himself and bed. He was given prn haldol 0.5 mg IM last evening which was not effective, then given a much higher dose of 10 mg IM and benadryl 25 mg IM, which evidently put him to sleep for a couple of hours. When he became active, his behavior was unchanged/unimproved, however. On my assessment, pt remains unresponsive. He is exhibiting several repetitive behaviors. Pt noted to repeatedly raise his arms bilaterally in the air and drop them forcefully down on the bed or his abdomen. Occasionally he would raise both legs at the hip and drop them down as well. Additionally, he was noted to be breathing in an odd rhythm, forcefully puffing out each breath. ROS: Patient unable to participate in ROS.   New behaviors as described above    Current Medications Ordered:   sodium chloride flush  10 mL Intravenous 2 times per day    enoxaparin  30 mg Subcutaneous Daily    amiodarone  100 mg Oral Daily    aspirin  81 mg Oral Daily    atorvastatin  40 mg Oral Daily    finasteride  5 mg Oral Daily    budesonide-formoterol  2 puff Inhalation BID    insulin glargine  20 Units Subcutaneous Nightly    metoprolol succinate  12.5 mg Oral Daily    pantoprazole  40 mg Oral QAM AC    insulin lispro  0-6 Units Subcutaneous TID WC    insulin lispro  0-3 Units Subcutaneous Nightly      PRN Meds: haloperidol, haloperidol lactate, sodium chloride flush, promethazine **OR** ondansetron, albuterol sulfate HFA, ipratropium, glucose, dextrose, glucagon (rDNA), today, they are considering palliation. Given patient's worsening condition and new abnormalities on today's labs, as well as age, this certainly is reasonable. 4.  Will increase haldol to 2.5 mg q4h prn PO/IM. I would recommend against giving more than 5 mg IM at a time. 5.  Will add lorazepam 1 mg IV q4h prn for agitation. 6.  Recommend against using IM anticholinergics as these are likely to worsen delirium.     Total face to face time with patient was 30 minutes    Nyla Cordoba MD  Staff Psychiatrist

## 2020-08-16 NOTE — FLOWSHEET NOTE
08/16/20 0845   Vital Signs   Temp 99.1 °F (37.3 °C)   Temp Source Axillary   Pulse 95   Heart Rate Source Monitor   Resp 24   BP   (unable to obtain after multiple attempts pt thrashing)   Oxygen Therapy   SpO2 93 %   O2 Device None (Room air)   Pt remains in delirious state, thrashin, hitting arms against bed in fists every few seconds, rolling from side to side. Has multiple skin tears, open scabs from previous healed wounds to toes, and bruising to knees. RN applied preventative mepilexes to both knees, changed dressings to right toe and lateral ankle and applied secured dressing to left wrist skin tear. Pt was thrashing during these dressing changes and combative, eyes closed while these movements happen. Pt would not hold still long enough for BP to be taken after multiple attempts. Other vitals above stable. Lung sounds clear, s1 s2 regular rate. Oral care provided to moisten lips. Rails that were already padded with blankets were padded with pillows to lessen skin damage from patient thrashing.  Telesitter in place Will continue to monitor

## 2020-08-16 NOTE — FLOWSHEET NOTE
08/16/20 1530   Vital Signs   Temp 99.4 °F (37.4 °C)   Temp Source Axillary   Pulse 70   Heart Rate Source Monitor   Resp 20   /66   Oxygen Therapy   SpO2 92 %   O2 Device None (Room air)   Pt vitals able to be obtained. MUCH CALMER after ativan administration as soon as 60 seconds after ativan admin, patient stopped thrashing side to side and stopped hitting himself in stomach and moving legs. Appears comfortable. Regular rate of breathing, no accessory muscle use. Telesitter in place. No needs at this time. Will continue to monitor.

## 2020-08-16 NOTE — PROGRESS NOTES
Consult has been called to Dr. Tez Parker on 8/16/20. Spoke with delgado.  10:53 AM    Reggie Sims  8/16/2020

## 2020-08-16 NOTE — PROGRESS NOTES
Rn spoke with Deven Salmeron, who has decided to go the hospice route and not look further into neurological causes recommended by [facility]. POA gave RN ok to consult hospice. MD gave RN verbal order to consult hospice. Case management notified.

## 2020-08-16 NOTE — PROGRESS NOTES
From: Radha Smith RE: Danna michelle 1934 I am really concerned about this patient. He is thrashing around in the bed. flipping from side to side. he has multiple bruises and skin tears from all this. Last night he was calmer. tonight he just will not relax. I have had him for three nights and tonight is the worst I have seen him. I have given him the 0.5 mg of Haldol IM at 2115. it has not touched him at all. he has not slept in 3 days. he is not taking any thing orally either. I just don't know what to do with him. you touch him and he get more agitated. Orders received for Haldol 10 mg x 1 dose.

## 2020-08-16 NOTE — CARE COORDINATION
INTERDISCIPLINARY PLAN OF CARE CONFERENCE    Date/Time: 8/16/2020 3:21 PM  Completed by: Yaya Ramos, Case Management      Patient Name:  Carlos Rapp  YOB: 1934  Admitting Diagnosis: Multifocal pneumonia [J18.9]     Admit Date/Time:  8/9/2020  8:45 PM    Chart reviewed. Interdisciplinary team met to discuss patient progress and discharge plans. Disciplines included Case Management, Nursing, and Dietitian. Current Status:ongoing    PT/OT recommendation:n/a    Anticipated Discharge Date: tbd  Expected D/C Disposition:  tbd  Confirmed plan with patient and/or family Yes  Discharge Plan Comments: Reviewed chart. Role of discharge planner explained and patient's daughter, Ana Lawrence, verbalized understanding. Pt is from [address] [place] [address]. CM received a consult for hospice. CM called pt's daughter, Chanelle Lofton ([phone]). She expressed her desire for wanting IPU with [facility] and absolutely wants pt to be seen today. CM called [facility] ([phone]) and spoke with Shar Diaz. Per Gloria Rick RN, will be calling CM back to let CM know when she will be out here to see pt. CM called Ana Lawrence (pt's daughter) to let her know that Radha is to let CM know when she will be out and that Radha will probably be calling her. Addendum [phone] 8/16/2020: CM called [facility] again and spoke with Honeychris Oseguera. Gregorio explained that CM had not heard back to know when Radha would be coming to eval pt. [employer] states that she will email Radha and will call CM back with an ETA. Addendum [phone] 8/16/2020: Radha with [facility] called and stated that she is leaving [facility] now and will be here soon. Radha with [facility] states that she is calling the daughter now and will speak with her regarding her wishes. Per Radha, no beds at [address], but GREGORIO did speak with Wendy Coronel regarding the [address] location.  GREGORIO informed Babak Melloite about Radha being on her way to the hospital.     GREGORIO did attempt to call Ana Lawrence to let her know that Radha was on her way, but GREGORIO received her VM and CM did leave a VM. Covid neg as of 8/12/2020      Addendum 8/16/220 8344: CM PerfectServed Dr. Rohit Sheets and Howard Morelos to see I they could d/c pt. Radha with [address] [facility] ([phone]) has asked for CM to now wait to d/c pt until tomorrow,as she is setting everything up for tomorrow. Radha states that she will let Anamaria Virk know. Bonnie Arenas RN, is aware. The Plan for Transition of Care is related to the following treatment goals: IPU    The Patient and/or patient representative pt's daughter, Anamaria Virk,  was provided with a choice of provider and agrees   with the discharge plan. [x] Yes [] No    Freedom of choice list was provided with basic dialogue that supports the patient's individualized plan of care/goals, treatment preferences and shares the quality data associated with the providers.  [x] Yes [] No    Home O2 in place on admit: No  Pt informed of need to bring portable home O2 tank on day of discharge for nursing to connect prior to leaving:  Not Indicated  Verbalized agreement/Understanding:  Not Indicated

## 2020-08-16 NOTE — PROGRESS NOTES
Progress Note    Admit Date:  8/9/2020    Admitted for PNA and COVID-19 rule out . Chest x-ray with multifocal airspace disease. COVID-19 negative     Subjective:  Mr. Elizabeth Ewing  Is stable and afebrile. He feels much better today. He is on room air. 8/13- he is more withdrawn and not wanting to eat and refusing his medication. 8/14- not eating or drinking and still confused. [facility] psychiatry saw patient and feel he has hypoactive delirium. 8/15-awake but he does not respond to verbal commands. He is thrashing in the bed. P.o. intake is poor. 8/16-status is still poor. He is thrashing around in the bed. Psychiatry has seen him and feel he may be having hyperactive delirium. It is concerning that his mental this is not improved, in fact has gotten worse over the last several days. His creatinine is also worse. Objective:   Vitals:    08/16/20 0845   BP:    Pulse: 95   Resp: 24   Temp: 99.1 °F (37.3 °C)   SpO2: 93%       Intake/Output Summary (Last 24 hours) at 8/16/2020 1049  Last data filed at 8/16/2020 5043  Gross per 24 hour   Intake 0 ml   Output --   Net 0 ml       Physical Exam:  CNS: eyes open but does not respond to verbal command  PSYCH:  The patient is cooperative, answering questions appropriately. EYES:  Pupils are bilaterally equal.  ENT:  No oral mucosa lesions. RESPIRATORY SYSTEM:   Clear to auscultation , no wheezes rales or rhonchi . CVS:   Regular rate and rhythm. ABDOMEN:  Soft. No evidence of distension. MUSCULOSKELETAL:  No acute obvious musculoskeletal deformities. SKIN:  The patient has got a midline biceps scar over the chest skin that is old and healed well.   NEURO : Nonfocal       Scheduled Meds:   sodium chloride flush  10 mL Intravenous 2 times per day    enoxaparin  30 mg Subcutaneous Daily    amiodarone  100 mg Oral Daily    aspirin  81 mg Oral Daily    atorvastatin  40 mg Oral Daily    finasteride  5 mg Oral Daily    budesonide-formoterol  2 puff Inhalation BID    furosemide  20 mg Oral Daily    insulin glargine  20 Units Subcutaneous Nightly    metoprolol succinate  12.5 mg Oral Daily    pantoprazole  40 mg Oral QAM AC    insulin lispro  0-6 Units Subcutaneous TID WC    insulin lispro  0-3 Units Subcutaneous Nightly       Continuous Infusions:   sodium chloride 100 mL/hr at 08/15/20 1802    dextrose         PRN Meds:  haloperidol, haloperidol lactate, sodium chloride flush, promethazine **OR** ondansetron, albuterol sulfate HFA, ipratropium, glucose, dextrose, glucagon (rDNA), dextrose      Data:  CBC:   No results for input(s): WBC, HGB, HCT, MCV, PLT in the last 72 hours. BMP:   Recent Labs     08/14/20  0621 08/15/20  0630 08/16/20  0803    136 138   K 4.2 5.1 4.9   CL 97* 97* 100   CO2 22 17* 17*   BUN 46* 55* 73*   CREATININE 1.6* 2.0* 2.9*   Results for Azael Castellanos (MRN [id number]) as of 8/15/2020 13:43   Ref. Range 8/15/2020 06:30   Vancomycin Rm Latest Units: ug/mL 17.1     LIVER PROFILE:   No results for input(s): AST, ALT, LIPASE, BILIDIR, BILITOT, ALKPHOS in the last 72 hours. Invalid input(s): AMYLASE,  ALB  Pro-BNP  3,539High      Troponin  0. 04High       D-Dimer, Quant  744High      Procalcitonin  0.14      CULTURES  COVID-19: pending      RADIOLOGY  CT HEAD WO CONTRAST   Final Result   No acute intracranial abnormality. XR CHEST PORTABLE   Final Result   Multifocal left midlung consolidation, most consistent with pneumonia,   consider a bacterial origin. Small bilateral pleural effusions. Radiographic follow-up to resolution is recommended. Assessment/Plan:  Acute hypoxic respiratory failure resolved. - Does not use supplemental O2 at home   - Was requiring up to 4L NC--> now on room air --> wean as tolerated to room air  - Suspect 2/2 multifocal PNA, see below  - COVID 19 negative. - pulmonology consulted    Multifocal PNA-done with his antibiotics.   - CXR shows bilateral effusions with multifocal PNA  - Concerns for gram neg organism with risk for MRSA (patient is from SNF). He is finished vancomycin cefepime and azithromycin.  - PCT is WNL  - Pulmonology consult  Discussed with pulmonologist.    COPD  - not reported as AE      Leukocytosis   - suspect 2/2 HCAP   - Trend      Hyperkalemia  - K+ on admission 5.5. Repeat potassium levels are normal  - EKG without acute change    CKD Stage III   - Baseline creatinine ~1.6   - Creatinine on admission 1.8  -Creatinine is up a little to 2. Resume IV fluids. Elevated troponin   - paced rhythm on EKG  - No acute CP complaints per chart  - may be related to CKD  - Continue tele monitor  - Trend troponin   - PRN EKG     DM2 with neuropathy   - BG is elevated   - Reorder home Lantus and SSI   - Carb control diet  - Continue Neurontin   - Monitor     HTN  - BP is controlled  - Continue home medications     HLD  - Continue statin     Depression   - Continue home medications     Atrial fibrillation   - Not on AC   - Continue home rate controllers  - Rate controlled, paced rhythm   - Continue to monitor     PAD  - follows with Vascular surgery   - s/p bilateral leg angioplasties after chronic non-healing wounds  - Continue home     AAA  - 4.3 cm on last duplex   - Follows with vascular  - Was due for surveillance imaging in Feb 2020     Chronic diabetic foot ulcer  - has followed with Dr. Chris Martin and wound care   - s/p left hallux amputation  - Continue wound care for now   - Consult podiatry if wound integrity worsens     Hyperactive delirium. He is mentation is gotten worse. I will talk to the family. They want to continue aggressive care he will need neurological evaluation. He will probably need an EEG and possible LP. He may also need infectious disease involvement if those are positive. Given advanced age palliation may be the best option.       I personally talked to Mónica Shetty who is listed in the chart and who apparently has been actively involved in his care. She is going to talk to the patient's daughter. I recommend palliative care and hospice.     DVT Prophylaxis: Lovenox   Diet: DIET CARDIAC; Carb Control: 4 carb choices (60 gms)/meal  Code Status: DNR-CCA        Yudelka Edge 10:49 AM 8/16/2020

## 2020-08-16 NOTE — PROGRESS NOTES
8/14/20  Impression    No acute intracranial abnormality. ASSESSMENT:  · Left-sided HCAP  · History of COPD  · CKD stage III  · Leukocytosis  · Dementia: Baseline is nonverbal and combative. Has history of hypoactive delirium  · Acute on chronic kidney failure    PLAN:  · Received 7 days cefepime, vancomycin and 5 days of azithromycin  · I discussed this patient's care with Dr. Paula Chan. I agree with hospice and palliation.

## 2020-08-17 ENCOUNTER — HOSPITAL ENCOUNTER (OUTPATIENT)
Dept: WOUND CARE | Age: 85
Discharge: HOME | End: 2020-08-17
Payer: MEDICARE

## 2020-08-17 VITALS
HEIGHT: 68 IN | HEART RATE: 78 BPM | DIASTOLIC BLOOD PRESSURE: 83 MMHG | BODY MASS INDEX: 26.87 KG/M2 | SYSTOLIC BLOOD PRESSURE: 163 MMHG | RESPIRATION RATE: 20 BRPM | WEIGHT: 177.3 LBS | OXYGEN SATURATION: 96 % | TEMPERATURE: 97.1 F

## 2020-08-17 LAB
ANION GAP SERPL CALCULATED.3IONS-SCNC: 24 MMOL/L (ref 3–16)
BUN BLDV-MCNC: 98 MG/DL (ref 7–20)
CALCIUM SERPL-MCNC: 9.5 MG/DL (ref 8.3–10.6)
CHLORIDE BLD-SCNC: 107 MMOL/L (ref 99–110)
CO2: 15 MMOL/L (ref 21–32)
CREAT SERPL-MCNC: 4.6 MG/DL (ref 0.8–1.3)
GFR AFRICAN AMERICAN: 15
GFR NON-AFRICAN AMERICAN: 12
GLUCOSE BLD-MCNC: 180 MG/DL (ref 70–99)
GLUCOSE BLD-MCNC: 215 MG/DL (ref 70–99)
GLUCOSE BLD-MCNC: 219 MG/DL (ref 70–99)
MAGNESIUM: 3.2 MG/DL (ref 1.8–2.4)
PERFORMED ON: ABNORMAL
PERFORMED ON: ABNORMAL
PHOSPHORUS: 5.4 MG/DL (ref 2.5–4.9)
POTASSIUM SERPL-SCNC: 5.2 MMOL/L (ref 3.5–5.1)
SODIUM BLD-SCNC: 146 MMOL/L (ref 136–145)

## 2020-08-17 PROCEDURE — 6360000002 HC RX W HCPCS: Performed by: INTERNAL MEDICINE

## 2020-08-17 PROCEDURE — 83735 ASSAY OF MAGNESIUM: CPT

## 2020-08-17 PROCEDURE — 36415 COLL VENOUS BLD VENIPUNCTURE: CPT

## 2020-08-17 PROCEDURE — 84100 ASSAY OF PHOSPHORUS: CPT

## 2020-08-17 PROCEDURE — 2580000003 HC RX 258: Performed by: INTERNAL MEDICINE

## 2020-08-17 PROCEDURE — 80048 BASIC METABOLIC PNL TOTAL CA: CPT

## 2020-08-17 PROCEDURE — 6360000002 HC RX W HCPCS: Performed by: PSYCHIATRY & NEUROLOGY

## 2020-08-17 PROCEDURE — 99239 HOSP IP/OBS DSCHRG MGMT >30: CPT | Performed by: INTERNAL MEDICINE

## 2020-08-17 RX ORDER — HALOPERIDOL 5 MG/ML
2.5 INJECTION INTRAMUSCULAR EVERY 4 HOURS PRN
Qty: 2 ML | Refills: 0
Start: 2020-08-17

## 2020-08-17 RX ORDER — LORAZEPAM 2 MG/ML
1 INJECTION INTRAMUSCULAR EVERY 4 HOURS PRN
Qty: 1 ML | Refills: 0
Start: 2020-08-17 | End: 2020-08-18

## 2020-08-17 RX ADMIN — LORAZEPAM 1 MG: 2 INJECTION INTRAMUSCULAR; INTRAVENOUS at 11:16

## 2020-08-17 RX ADMIN — ENOXAPARIN SODIUM 30 MG: 30 INJECTION SUBCUTANEOUS at 11:15

## 2020-08-17 RX ADMIN — Medication 10 ML: at 14:50

## 2020-08-17 RX ADMIN — LORAZEPAM 1 MG: 2 INJECTION INTRAMUSCULAR; INTRAVENOUS at 02:25

## 2020-08-17 RX ADMIN — LORAZEPAM 1 MG: 2 INJECTION INTRAMUSCULAR; INTRAVENOUS at 15:01

## 2020-08-17 NOTE — PROGRESS NOTES
Pt restless in bed tossing arms and legs around and moaning gave ativan given per PRN orders bed alalrm on tele sittier in room.

## 2020-08-17 NOTE — PROGRESS NOTES
1mg of ativan wasted with witness.      Electronically signed by Kirill Ngo RN on 8/17/2020 at 3:13 PM  Lanre Cheng RN

## 2020-08-17 NOTE — PLAN OF CARE
Problem: Falls - Risk of:  Goal: Will remain free from falls  Description: Will remain free from falls  Outcome: Ongoing  Goal: Absence of physical injury  Description: Absence of physical injury  Outcome: Ongoing     Problem: Discharge Planning:  Goal: Discharged to appropriate level of care  Description: Discharged to appropriate level of care  Outcome: Ongoing  Goal: Participates in care planning  Description: Participates in care planning  Outcome: Ongoing     Problem: Airway Clearance - Ineffective:  Goal: Clear lung sounds  Description: Clear lung sounds  Outcome: Ongoing  Goal: Ability to maintain a clear airway will improve  Description: Ability to maintain a clear airway will improve  Outcome: Ongoing     Problem: Airway Clearance - Ineffective:  Goal: Clear lung sounds  Description: Clear lung sounds  Outcome: Ongoing  Goal: Ability to maintain a clear airway will improve  Description: Ability to maintain a clear airway will improve  Outcome: Ongoing

## 2020-08-17 NOTE — CARE COORDINATION
INTERDISCIPLINARY PLAN OF CARE CONFERENCE    Date/Time: 8/17/2020 9:36 AM  Completed by: Jhonny Spear, Case Management      Patient Name:  Adolfo Pereira  YOB: 1934  Admitting Diagnosis: Multifocal pneumonia [J18.9]     Admit Date/Time:  8/9/2020  8:45 PM    Chart reviewed. Interdisciplinary team met to discuss patient progress and discharge plans. Disciplines included Case Management, Nursing, and Dietitian. Current Status:ongoing    PT/OT recommendation:n/a    Anticipated Discharge Date: tbd  Expected D/C Disposition:  Daughter, Omari Werner, wants IPU with HOC  Confirmed plan with patient and/or family Yes  Discharge Plan Comments: Reviewed chart. Role of discharge planner explained and patient's daughter, Omari Werner,  verbalized understanding. Pt is from [facility]. Kiesha plans for pt to go to [place] [address] with [address] Omari Werner was wondering why pt did not go to [place] [address] last night. CM explained that Radha, with HOC, stated that she wanted to stop the transfer, as she was having people stay over (past 5pm) to receive the paperwork, but when Radha with [address] realized CM was still trying to have the MD place the D/C order, and Dr. Everardo Welch would have placed the d/c order on 8/16/2020, as she was the MD on call, Radha with [address] was afraid things would be missed and Radha asked to stop the d/c and stated that someone with [address] would [facility] pt and set up transport to [place] [address]. Omari Werner verbalized understanding. Omari Werner states that she would still like pt to go to [place] [address] today. CM informed SAPPHIRE Hoyos, CM of this, as she is the CM for the floor today. Omari Werner states that she plans to drive to the [place] [address] today to see pt. ADDENDUM: Spoke with Radha at  [address] who verifies that nurse will be here today around 12 noon to facilitate transfer to [address] [place] [address]. 12:12 ADDENDUM: Spoke with Mila Gallego, [facility] nurse who states pt will go to inpt unit at [place]. States PTS alea pick   up at 14:30-15:00 to transport to inpt unit.  Mila Gallego states she will call PHIL. Mare will be in approx 13:00 to finalize dc. No other dc needs identified. 15:16 ADDENDUM: Pt left via PTS for inpt HOC unit [place]. The Plan for Transition of Care is related to the following treatment goals: IPU    The Patient and/or patient representative Dayana Lopez was provided with a choice of provider and agrees   with the discharge plan. [x] Yes [] No    Freedom of choice list was provided with basic dialogue that supports the patient's individualized plan of care/goals, treatment preferences and shares the quality data associated with the providers.  [x] Yes [] No    Home O2 in place on admit: No  Pt informed of need to bring portable home O2 tank on day of discharge for nursing to connect prior to leaving:  Not Indicated  Verbalized agreement/Understanding:  Not Indicated

## 2020-08-17 NOTE — DISCHARGE INSTR - COC
Continuity of Care Form    Patient Name: Carlos Rapp   :  1934  MRN:  [id number]    Admit date:  2020  Discharge date:      Code Status Order: DNR-CCA   Advance Directives:   [facility] Documentation     Date/Time Healthcare Directive Type of Healthcare Directive Copy in [address] Agent's Name Healthcare Agent's Phone Number    08/10/20 0102  Yes, patient has an advance directive for healthcare treatment  Durable power of  for health care; Health care treatment directive  No, copy requested from [address] [place] [address]   Teresita Oleary  [phone] & [phone]          Admitting Physician:  Corrine Boeck, MD  PCP: Amber Amos    Discharging Nurse: [facility] Unit/Room#: 0228/0228-01  Discharging Unit Phone Number: ***    Emergency Contact:   Extended Emergency Contact Information  Primary Emergency Contact: [facility] [address]  Phone: [phone]  Relation: Child  Secondary Emergency Contact: Sherry Collado  [place] Phone: [phone]  Relation: Other   needed?  No    Past Surgical History:  Past Surgical History:   Procedure Laterality Date    CARDIAC SURGERY      aortocoronary bypass    COLONOSCOPY  2019    Incomplete colonoscopy; Poor prep    COLONOSCOPY N/A 2019    COLON W/ANES. (8:00) (DAUGHTER,JANIA, IS POA, WILL BE HERE FOR PROCEDURE) performed by Ry Mejia DO at [address]  2019    colon;    COLONOSCOPY N/A 2019    COLONOSCOPY POLYPECTOMY SNARE/COLD BIOPSY performed by Ry Mejia DO at [address]      TOE AMPUTATION Left 10/10/2019    PARTIAL FOOT AMPUTATION (Left great toe)    TOE AMPUTATION Left 10/10/2019    PARTIAL FOOT AMPUTATION performed by Stephanie [facility] DPM at [facility] Immunization History:   Immunization History   Administered Date(s) Administered    Influenza Virus Vaccine 03/10/2018    Pneumococcal Conjugate 13-valent (Avinash Linker) 06/10/2018       Active Problems:  Patient Active Problem List   Diagnosis Code    HCAP (healthcare-associated pneumonia) J18.9    Acute hypoxemic respiratory failure (HCC) J96.01    MARCOS (acute kidney injury) ([facility] N17.9    COPD exacerbation ([facility]) J44.1    Hypertension I10    Hypercholesteremia E78.00    GERD (gastroesophageal reflux disease) K21.9    Diabetes mellitus ([facility] E11.9    Pneumonia due to organism J18.9    Leukocytosis D72.829    Pleural effusion J90    Multifocal pneumonia J18.9    Stage 3 chronic kidney disease (HCC) N18.3    Hypoxia R09.02       Isolation/Infection:   Isolation          No Isolation        Patient Infection Status     Infection Onset Added Last Indicated Last Indicated By Review Planned Expiration Resolved Resolved By    None active    Resolved    COVID-19 Rule Out 08/09/20 08/09/20 08/09/20 COVID-19 (Ordered)   08/12/20 Rule-Out Test Resulted          Nurse Assessment:  Last Vital Signs: BP (!) 163/83   Pulse 78   Temp 97.1 °F (36.2 °C) (Axillary)   Resp 20   Ht 5' 8\" (1.727 m)   Wt 177 lb 4.8 oz (80.4 kg)   SpO2 96%   BMI 26.96 kg/m²     Last documented pain score (0-10 scale): Pain Level: 0  Last Weight:   Wt Readings from Last 1 Encounters:   08/13/20 177 lb 4.8 oz (80.4 kg)     Mental Status:  disoriented    IV Access:  - Peripheral IV - site  RFA, insertion date: ***    Nursing Mobility/ADLs:  Walking   Dependent  Transfer  Dependent  Bathing  Dependent  Dressing  Dependent  Toileting  Dependent  Feeding  Dependent  Med Admin  Dependent  Med Delivery   crushed    Wound Care Documentation and Therapy:  Wound 08/10/20 Ankle Outer;Right (Active)   Wound Venous 08/10/20 0145   Dressing Status Clean;Dry; Intact 08/16/20 2010   Dressing Changed Other (Comment) 08/16/20 2010 Dressing/Treatment Foam;Alginate with Ag 08/16/20 2010   Wound Cleansed Rinsed/Irrigated with saline 08/16/20 0856   Dressing Change Due 08/19/20 08/16/20 2010   Wound Length (cm) 0.5 cm 08/10/20 0145   Wound Width (cm) 0.5 cm 08/10/20 0145   Wound Depth (cm) 0.5 cm 08/10/20 0145   Wound Surface Area (cm^2) 0.25 cm^2 08/10/20 0145   Wound Volume (cm^3) 0.12 cm^3 08/10/20 0145   Wound Assessment Red 08/13/20 1000   Drainage Amount Small 08/13/20 1000   Drainage Description Serosanguinous 08/13/20 1000   Odor None 08/12/20 2131   Margins Defined edges 08/12/20 2131   Josee-wound Assessment Blanchable erythema 08/13/20 1000   Red%Wound Bed 100 08/10/20 2224   Number of days: 7       Wound 08/12/20 Toe (Comment  which one) Right diabetic foot ulcer, open (Active)   Dressing Status Clean;Dry; Intact 08/16/20 2010   Dressing Changed Other (Comment) 08/16/20 2010   Dressing/Treatment Foam;Alginate with Ag 08/16/20 2010   Wound Cleansed Rinsed/Irrigated with saline 08/16/20 0856   Dressing Change Due 08/19/20 08/16/20 2010   Wound Assessment Yellow; Red 08/13/20 1000   Drainage Amount Scant 08/13/20 1000   Drainage Description Serosanguinous 08/13/20 1000   Josee-wound Assessment Blanchable erythema;Pink 08/13/20 1000   Number of days: 4       Wound 08/16/20 Toe (Comment  which one) Left (Active)   Dressing Status Clean;Dry; Intact 08/16/20 2010   Dressing Changed Other (Comment) 08/16/20 2010   Dressing/Treatment Alginate with Ag; Foam 08/16/20 2010   Wound Cleansed Rinsed/Irrigated with saline 08/16/20 0856   Dressing Change Due 08/19/20 08/16/20 2010   Wound Assessment Other (Comment) 08/16/20 2010   Number of days: 1        Elimination:  Continence:   · Bowel: No  · Bladder: No  Urinary Catheter: None   Colostomy/Ileostomy/Ileal Conduit: No       Date of Last BM: 8/16/2020    Intake/Output Summary (Last 24 hours) at 8/17/2020 1426  Last data filed at 8/16/2020 1828  Gross per 24 hour   Intake 0 ml   Output --   Net 0 ml No intake/output data recorded. Safety Concerns: At Risk for Falls    Impairments/Disabilities:      ***    Nutrition Therapy:  Current Nutrition Therapy:   - Oral Diet:  Carb Control 3 carbs/meal (1500kcals/day)    Routes of Feeding: Oral  Liquids: Thin Liquids  Daily Fluid Restriction: no  Last Modified Barium Swallow with Video (Video Swallowing Test): not done    Treatments at the Time of Hospital Discharge:   Respiratory Treatments: ***  Oxygen Therapy:  is not on home oxygen therapy. Ventilator:    - No ventilator support    Rehab Therapies: ***  Weight Bearing Status/Restrictions: No weight bearing restirctions  Other Medical Equipment (for information only, NOT a DME order):  Lift   Other Treatments: ***    Patient's personal belongings (please select all that are sent with patient):  dennis LEARY SIGNATURE:  Electronically signed by Willie Feliz RN on 8/17/20 at 2:30 PM EDT    CASE MANAGEMENT/SOCIAL WORK SECTION    Inpatient Status Date: ***    Readmission Risk Assessment Score:  Readmission Risk              Risk of Unplanned Readmission:        18           Discharging to Facility/ Agency   · Name:   · Address:  · Phone:  · Fax:    Dialysis Facility (if applicable)   · Name:  · Address:  · Dialysis Schedule:  · Phone:  · Fax:    / signature: {Esignature:[id number]}    PHYSICIAN SECTION    Prognosis: {Prognosis:8839954439}    Condition at Discharge: [facility] Patient Condition:[id number]}    Rehab Potential (if transferring to Rehab): {Prognosis:2108938603}    Recommended Labs or Other Treatments After Discharge: ***    Physician Certification: I certify the above information and transfer of Rashad Aguilar  is necessary for the continuing treatment of the diagnosis listed and that he requires {Admit to Appropriate Level of Care:41374} for {GREATER/LESS:926356284} 30 days.      Update Admission H&P: {CHP DME Changes in HVHRU:924809366}    PHYSICIAN SIGNATURE: {[facility]:948903519}

## 2020-08-17 NOTE — PROGRESS NOTES
Pt d/c report / paperwork given to EMT ,  hospice  nurse requested IV to remain to RFA,  Ativan given for d/c pt restless and moaning . Pt left with belongings.

## 2020-11-03 PROBLEM — J18.9 PNEUMONIA DUE TO ORGANISM: Status: RESOLVED | Noted: 2020-11-03 | Resolved: 2020-11-03

## 2023-08-23 NOTE — BH NOTE
being able to have visitors, and she states that he has occasionally made statements of \"what's the purpose? \". She denies that he has made any suicidal statements or verbalized plan/intent. She denies a history of suicide attempts. Called and spoke to nurse at [facility]. She reports that she worked with Torrie Lundborg on Friday and he was A&Ox4 and independent with his ADLs, which is his baseline. She notes that his appetite is \"normal\" and that he snacks frequently, denies issues with taking medications while at the [facility]. She reports that he has verbalized some depression in the past but notes he has not verbalized any suicidal ideation or thoughts of self-harm. Duration: 3-5 days   Severity: Severe  Context: Stress, medical issues   Associated Smptoms: As above    Past Psychiatric History:    Patient's past psychiatric history is widely unknown due to current presentation. Chart review indicates a history of depression. No record found of previous psychiatric admissions, no known outpatient psychiatric services. No known previous suicide attempts. Chart review indicates a history of Cymbalta, Prozac, Gabapentin.     Past Medical History:  Past Medical History:   Diagnosis Date    AAA (abdominal aortic aneurysm) (HCC)     Acute constipation     Acute gastrointestinal hemorrhage     Anemia     Atrial fibrillation (HCC)     Bladder CA in situ     BPH (benign prostatic hyperplasia)     Cancer (HCC)     bladder    CHF (congestive heart failure) (HCC)     Chronic pain     CKD (chronic kidney disease)     Constipation     COPD (chronic obstructive pulmonary disease) (HCC)     Coronary arteriosclerosis     Current mild episode of major depressive disorder (HCC)     Depression     Depression     Diabetes mellitus (HCC)     Diabetic neuropathy (HCC)     Diverticulitis of intestine w/o perforation or abscess with bleeding     GERD (gastroesophageal reflux disease)     GI hemorrhage     Hypercholesteremia     Hypertension     Hypoaldosteronism ([facility]     Insomnia     Neuropathy due to type 2 diabetes mellitus ([facility]     Pacemaker     Postsurgical aortocoronary bypass status     Pure hypercholesterolemia     PVD (peripheral vascular disease) ([facility]     S/P cataract surgery     Ulcer of left foot (HCC)     Vitamin D deficiency      Home Medications:  Prior to Admission medications    Medication Sig Start Date End Date Taking? Authorizing Provider   FLUoxetine (PROZAC) 10 MG capsule Take 10 mg by mouth nightly   Yes Historical Provider, MD   amiodarone (PACERONE) 100 MG tablet Take 100 mg by mouth daily   Yes Historical Provider, MD   triamcinolone (KENALOG) 0.1 % cream Apply 1 each topically daily Rt ankle   Yes Historical Provider, MD   gabapentin (NEURONTIN) 100 MG capsule Take 300 mg by mouth 2 times daily. Yes Historical Provider, MD   Nutritional Supplements (GLUCERNA ADVANCE SHAKE PO) Take by mouth 2 times daily   Yes Historical Provider, MD   insulin glargine (LANTUS) 100 UNIT/ML injection vial Inject 20 Units into the skin nightly    Yes Historical Provider, MD   fluticasone-salmeterol (ADVAIR DISKUS) 100-50 MCG/DOSE diskus inhaler Inhale 1 puff into the lungs 2 times daily   Yes Historical Provider, MD   ALOGLIPTIN BENZOATE PO Take 12.5 mg by mouth daily    Yes Historical Provider, MD   furosemide (LASIX) 20 MG tablet Take 20 mg by mouth daily    Yes Historical Provider, MD   acetaminophen (TYLENOL) 325 MG tablet Take 650 mg by mouth every 4 hours as needed for Pain   Yes Historical Provider, MD   Protein POWD Take by mouth 1 scoop in 4oz of fluids bid for wound healing   Yes Historical Provider, MD   glipiZIDE (GLUCOTROL) 5 MG tablet Take 5 mg by mouth 2 times daily (before meals)    Yes Historical Provider, MD   vitamin D (CHOLECALCIFEROL) 5000 units CAPS capsule Take 10,000 Units by mouth once a week Weekly on Mondays.    Yes Historical Provider, MD   DULoxetine (CYMBALTA) 60 MG extended release capsule Take 60 mg by mouth daily    Yes Historical Provider, MD   polyethylene glycol (GLYCOLAX) packet Take 17 g by mouth See Admin Instructions Every Monday and Friday    Yes Historical Provider, MD   insulin lispro (HUMALOG) 100 UNIT/ML injection vial Inject into the skin 4 times daily (before meals and nightly) Sliding scale 200-250=4 units  251-300=6 units  301-350=8 units  351-400=10 units  >400 call MD/CNP   Yes Historical Provider, MD   metoprolol succinate (TOPROL XL) 25 MG extended release tablet Take 12.5 mg by mouth daily Hold for SBP <100   Yes Historical Provider, MD   atorvastatin (LIPITOR) 40 MG tablet atorvastatin 40 mg tablet   Yes Historical Provider, MD   aspirin 81 MG chewable tablet Take 81 mg by mouth daily    Yes Historical Provider, MD   omeprazole (PRILOSEC) 40 MG delayed release capsule Take 40 mg by mouth daily    Yes Historical Provider, MD   finasteride (PROSCAR) 5 MG tablet Take 5 mg by mouth daily    Yes Historical Provider, MD     Chemical Dependency History:   Tobacco: Per Epic, former smoker  Alcohol: Per Epic, none  Illicit: Per Epic, none     Family Hx:    None known     Social Hx:   Patient's past social history is widely unknown due to his current presentation. He has been living at the [facility]. Chart review indicates that he is , has at least one child, previously worked for [employer]. No known trauma history, no known legal issues.     Current Medications Ordered:   sodium chloride flush  10 mL Intravenous 2 times per day    enoxaparin  30 mg Subcutaneous Daily    azithromycin  500 mg Intravenous Q24H    cefepime  2 g Intravenous Q12H    amiodarone  100 mg Oral Daily    aspirin  81 mg Oral Daily    atorvastatin  40 mg Oral Daily    DULoxetine  60 mg Oral Daily    finasteride  5 mg Oral Daily    FLUoxetine  10 mg Oral Nightly    budesonide-formoterol  2 puff Inhalation BID    furosemide  20 mg Oral Daily    gabapentin  300 mg Oral BID    insulin glargine  20 Units Subcutaneous Nightly    metoprolol succinate  12.5 mg Oral Daily    pantoprazole  40 mg Oral QAM AC    insulin lispro  0-6 Units Subcutaneous TID WC    insulin lispro  0-3 Units Subcutaneous Nightly    vancomycin (VANCOCIN) intermittent dosing (placeholder)   Other RX Placeholder      PRN Meds: sodium chloride flush, promethazine **OR** ondansetron, albuterol sulfate HFA, ipratropium, glucose, dextrose, glucagon (rDNA), dextrose     ROS:  See Medical H&PE      PE:    BP (!) 145/85   Pulse 75   Temp 98.9 °F (37.2 °C) (Oral)   Resp 22   Ht 5' 8\" (1.727 m)   Wt 177 lb 4.8 oz (80.4 kg)   SpO2 92%   BMI 26.96 kg/m²       Motor / Gait: Observed lying in bed, no involuntary or abnormal movements noted  AIMS: 0    Mental Status Examination:    Appearance: WM, appears stated age, lying in bed, covered in blankets, fair grooming and fair hygiene    Behavior/Attitude Toward Examiner: Difficult to engage, poor eye contact  Speech: Poverty of speech, did not respond to questions when asked  Mood: TOMMY due to current presentation  Affect: Flat affect  Thought Processes: TOMMY due to current presentation  Thought Content: TOMMY due to current presentation  Perceptions: TOMMY due to current presentation  Attention: TOMMY due to current presentation  Abstraction: TOMMY due to current presentation  Cognition: TOMMY due to current presentation  Insight: TOMMY due to current presentation  Judgment: TOMMY due to current presentation    LAB: Reviewed all labs obtained during admission to date. Dx:   Primary Psychiatric (DSM V) Diagnosis: Delirium, hypoactive  Secondary Psychiatric (DSM V) Diagnoses: Depression, per history   Chemical Dependency Diagnoses: None     Recommendations:    1. Chart was reviewed, patient was seen, collateral information was obtained from patient's daughter and [facility], and case was reviewed with Dr. Patrick Benjamin.    2. Per multiple reliable sources, patient is alert and oriented x4 at baseline. He is reportedly independent with his ADLs and has a healthy appetite. [facility] reports that he takes his medications with no issues. The change in his presentation was an acute change from baseline with sudden onset which supports that diagnosis of hypoactive delirium. 3. At this time patient does not meet criteria for inpatient psychiatric admission. It is not recommended starting any psychotropic medications at this time as it is suspected that patient's presentation will improve as his medical status improves. If the patient were to present with mixed delirium (psychomotor agitation, restlessness, hallucinations), he can be started on low-dose Haldol 0.5 mg PO or IM Q6 PRN. Limit medications that can worsen delirium. Spent >80 minutes face to face with patient of which >50% was spent counseling and providing education regarding diagnosis, treatment options, and prognosis. Thank you for consult. Please do not hesitate to contact provider if there are additional questions regarding patient.     Serjio Boateng, MPH, APRN, PMHNP-BC  08/13/20 Cheek-To-Nose Interpolation Flap Text: A decision was made to reconstruct the defect utilizing an interpolation axial flap and a staged reconstruction.  A telfa template was made of the defect.  This telfa template was then used to outline the Cheek-To-Nose Interpolation flap.  The donor area for the pedicle flap was then injected with anesthesia.  The flap was excised through the skin and subcutaneous tissue down to the layer of the underlying musculature.  The interpolation flap was carefully excised within this deep plane to maintain its blood supply.  The edges of the donor site were undermined.   The donor site was closed in a primary fashion.  The pedicle was then rotated into position and sutured.  Once the tube was sutured into place, adequate blood supply was confirmed with blanching and refill.  The pedicle was then wrapped with xeroform gauze and dressed appropriately with a telfa and gauze bandage to ensure continued blood supply and protect the attached pedicle.

## (undated) DEVICE — CURITY ABDOMINAL PAD: Brand: CURITY

## (undated) DEVICE — ENDO CARRY-ON PROCEDURE KIT INCLUDES SUCTION TUBING, LUBRICANT, GAUZE, BIOHAZARD STICKER, TRANSPORT PAD AND INTERCEPT BEDSIDE KIT.: Brand: ENDO CARRY-ON PROCEDURE KIT

## (undated) DEVICE — Device: Brand: DISPOSABLE ELECTROSURGICAL SNARE

## (undated) DEVICE — PAD,ABDOMINAL,8"X10",ST,LF: Brand: MEDLINE

## (undated) DEVICE — APPLICATOR PREP 26ML 0.7% IOD POVACRYLEX 74% ISO ALC ST

## (undated) DEVICE — BANDAGE ROLL,100% COTTON, 8 PLY, LARGE: Brand: KERLIX

## (undated) DEVICE — PACK ORTH LO EXT VI

## (undated) DEVICE — GOWN SIRUS NONREIN XL W/TWL: Brand: MEDLINE INDUSTRIES, INC.

## (undated) DEVICE — SOLUTION IV IRRIG 500ML 0.9% SODIUM CHL 2F7123

## (undated) DEVICE — MAJOR SET UP PK

## (undated) DEVICE — 3M™ COBAN™ NL STERILE NON-LATEX SELF-ADHERENT WRAP, 2084S, 4 IN X 5 YD (10 CM X 4,5 M), 18 ROLLS/CASE: Brand: 3M™ COBAN™

## (undated) DEVICE — ELECTRODE PT RET AD L9FT HI MOIST COND ADH HYDRGEL CORDED

## (undated) DEVICE — INTENDED FOR TISSUE SEPARATION, AND OTHER PROCEDURES THAT REQUIRE A SHARP SURGICAL BLADE TO PUNCTURE OR CUT.: Brand: BARD-PARKER ® STAINLESS STEEL BLADES

## (undated) DEVICE — SYRINGE MED 10ML LUERLOCK TIP W/O SFTY DISP

## (undated) DEVICE — NEEDLE HYPO 25GA L1.5IN BLU POLYPR HUB S STL REG BVL STR

## (undated) DEVICE — SHOE, POST-OPERATIVE: Brand: DEROYAL

## (undated) DEVICE — TIP SUCT DIA12FR W STYL CTRL VENT DISPOSABLE FRAZ

## (undated) DEVICE — OCCLUSIVE GAUZE STRIP,3% BISMUTH TRIBROMOPHENATE IN PETROLATUM BLEND: Brand: XEROFORM

## (undated) DEVICE — COTTON UNDERCAST PADDING,CRIMPED FINISH: Brand: WEBRIL

## (undated) DEVICE — X-RAY CASSETTE COVER: Brand: CONVERTORS

## (undated) DEVICE — Z CONVERTED USE 2276073 ROLL BNDG L W4.5INXL4 1/8YD WHT COT 6 PLY CNFRM KERLIX

## (undated) DEVICE — GAUZE,SPONGE,4"X4",8PLY,STRL,LF,10/TRAY: Brand: MEDLINE

## (undated) DEVICE — HANDPIECE SET WITH HIGH FLOW TIP AND SUCTION TUBE: Brand: INTERPULSE

## (undated) DEVICE — SUTURE ETHLN SZ 3-0 L30IN NONABSORBABLE BLK FSL L30MM 3/8 1671H

## (undated) DEVICE — TRAP SPEC RETRV CLR PLAS POLYP IN LN SUCT QUIK CTCH

## (undated) DEVICE — ESMARK: Brand: DEROYAL

## (undated) DEVICE — ELECTRODE ECG MONITR FOAM TEAR DROP ADLT RED

## (undated) DEVICE — GLOVE ORANGE PI 7 1/2   MSG9075